# Patient Record
Sex: FEMALE | Race: BLACK OR AFRICAN AMERICAN | HISPANIC OR LATINO | Employment: FULL TIME | URBAN - METROPOLITAN AREA
[De-identification: names, ages, dates, MRNs, and addresses within clinical notes are randomized per-mention and may not be internally consistent; named-entity substitution may affect disease eponyms.]

---

## 2017-04-10 ENCOUNTER — ALLSCRIPTS OFFICE VISIT (OUTPATIENT)
Dept: OTHER | Facility: OTHER | Age: 27
End: 2017-04-10

## 2017-05-05 ENCOUNTER — GENERIC CONVERSION - ENCOUNTER (OUTPATIENT)
Dept: OTHER | Facility: OTHER | Age: 27
End: 2017-05-05

## 2017-05-05 ENCOUNTER — ALLSCRIPTS OFFICE VISIT (OUTPATIENT)
Dept: OTHER | Facility: OTHER | Age: 27
End: 2017-05-05

## 2017-05-05 ENCOUNTER — LAB REQUISITION (OUTPATIENT)
Dept: LAB | Facility: HOSPITAL | Age: 27
End: 2017-05-05
Payer: COMMERCIAL

## 2017-05-05 DIAGNOSIS — Z11.3 ENCOUNTER FOR SCREENING FOR INFECTIONS WITH PREDOMINANTLY SEXUAL MODE OF TRANSMISSION: ICD-10-CM

## 2017-05-05 PROCEDURE — 87591 N.GONORRHOEAE DNA AMP PROB: CPT | Performed by: NURSE PRACTITIONER

## 2017-05-05 PROCEDURE — 87491 CHLMYD TRACH DNA AMP PROBE: CPT | Performed by: NURSE PRACTITIONER

## 2017-05-08 LAB
CHLAMYDIA DNA CVX QL NAA+PROBE: NORMAL
N GONORRHOEA DNA GENITAL QL NAA+PROBE: NORMAL

## 2017-05-26 ENCOUNTER — APPOINTMENT (OUTPATIENT)
Dept: LAB | Facility: HOSPITAL | Age: 27
End: 2017-05-26
Payer: COMMERCIAL

## 2017-05-26 ENCOUNTER — ALLSCRIPTS OFFICE VISIT (OUTPATIENT)
Dept: OTHER | Facility: OTHER | Age: 27
End: 2017-05-26

## 2017-05-26 DIAGNOSIS — R39.9 UNSPECIFIED SYMPTOMS AND SIGNS INVOLVING THE GENITOURINARY SYSTEM: ICD-10-CM

## 2017-05-26 LAB
BACTERIA UR QL AUTO: ABNORMAL /HPF
BILIRUB UR QL STRIP: NEGATIVE
BILIRUB UR QL STRIP: NORMAL
CLARITY UR: ABNORMAL
CLARITY UR: NORMAL
COLOR UR: ABNORMAL
COLOR UR: YELLOW
GLUCOSE (HISTORICAL): NORMAL
GLUCOSE UR STRIP-MCNC: NEGATIVE MG/DL
HGB UR QL STRIP.AUTO: NEGATIVE
HGB UR QL STRIP.AUTO: NORMAL
HYALINE CASTS #/AREA URNS LPF: ABNORMAL /LPF
KETONES UR STRIP-MCNC: ABNORMAL MG/DL
KETONES UR STRIP-MCNC: NORMAL MG/DL
LEUKOCYTE ESTERASE UR QL STRIP: ABNORMAL
LEUKOCYTE ESTERASE UR QL STRIP: NORMAL
NITRITE UR QL STRIP: NEGATIVE
NITRITE UR QL STRIP: NORMAL
NON-SQ EPI CELLS URNS QL MICRO: ABNORMAL /HPF
PH UR STRIP.AUTO: 6 [PH]
PH UR STRIP.AUTO: 6 [PH] (ref 4.5–8)
PROT UR STRIP-MCNC: NEGATIVE MG/DL
PROT UR STRIP-MCNC: NORMAL MG/DL
RBC #/AREA URNS AUTO: ABNORMAL /HPF
SP GR UR STRIP.AUTO: 1.03
SP GR UR STRIP.AUTO: 1.03 (ref 1–1.03)
UROBILINOGEN UR QL STRIP.AUTO: 1
UROBILINOGEN UR QL STRIP.AUTO: 1 E.U./DL
WBC #/AREA URNS AUTO: ABNORMAL /HPF

## 2017-05-26 PROCEDURE — 87086 URINE CULTURE/COLONY COUNT: CPT

## 2017-05-26 PROCEDURE — 81001 URINALYSIS AUTO W/SCOPE: CPT

## 2017-05-27 LAB — BACTERIA UR CULT: NORMAL

## 2017-07-06 ENCOUNTER — ALLSCRIPTS OFFICE VISIT (OUTPATIENT)
Dept: OTHER | Facility: OTHER | Age: 27
End: 2017-07-06

## 2017-09-26 ENCOUNTER — ALLSCRIPTS OFFICE VISIT (OUTPATIENT)
Dept: OTHER | Facility: OTHER | Age: 27
End: 2017-09-26

## 2017-09-26 LAB
BILIRUB UR QL STRIP: NORMAL
CLARITY UR: NORMAL
CLUE CELL (HISTORICAL): NORMAL
COLOR UR: YELLOW
GLUCOSE (HISTORICAL): NORMAL
HGB UR QL STRIP.AUTO: NORMAL
HYPHAL YEAST (HISTORICAL): NORMAL
KETONES UR STRIP-MCNC: NORMAL MG/DL
KOH PREP (HISTORICAL): NORMAL
LEUKOCYTE ESTERASE UR QL STRIP: NORMAL
NITRITE UR QL STRIP: NORMAL
PH UR STRIP.AUTO: 4.5 [PH]
PH UR STRIP.AUTO: 5 [PH]
PROT UR STRIP-MCNC: NORMAL MG/DL
SP GR UR STRIP.AUTO: 1.02
TRICHOMONAS (HISTORICAL): NORMAL
UROBILINOGEN UR QL STRIP.AUTO: 0.2
YEAST (HISTORICAL): NORMAL

## 2017-12-06 ENCOUNTER — GENERIC CONVERSION - ENCOUNTER (OUTPATIENT)
Dept: OTHER | Facility: OTHER | Age: 27
End: 2017-12-06

## 2017-12-09 ENCOUNTER — APPOINTMENT (EMERGENCY)
Dept: RADIOLOGY | Facility: HOSPITAL | Age: 27
End: 2017-12-09
Payer: COMMERCIAL

## 2017-12-09 ENCOUNTER — HOSPITAL ENCOUNTER (EMERGENCY)
Facility: HOSPITAL | Age: 27
Discharge: HOME | End: 2017-12-09
Attending: EMERGENCY MEDICINE
Payer: COMMERCIAL

## 2017-12-09 VITALS
WEIGHT: 163 LBS | OXYGEN SATURATION: 98 % | HEART RATE: 71 BPM | HEIGHT: 67 IN | RESPIRATION RATE: 18 BRPM | SYSTOLIC BLOOD PRESSURE: 128 MMHG | TEMPERATURE: 97.8 F | BODY MASS INDEX: 25.58 KG/M2 | DIASTOLIC BLOOD PRESSURE: 74 MMHG

## 2017-12-09 DIAGNOSIS — F10.929 ALCOHOL INTOXICATION (HCC): ICD-10-CM
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
## 2017-12-09 DIAGNOSIS — S02.85XA: ICD-10-CM

## 2017-12-09 DIAGNOSIS — S01.81XA FACIAL LACERATION, INITIAL ENCOUNTER: ICD-10-CM

## 2017-12-09 PROCEDURE — 99285 EMERGENCY DEPT VISIT HI MDM: CPT

## 2017-12-09 PROCEDURE — 72125 CT NECK SPINE W/O DYE: CPT

## 2017-12-09 PROCEDURE — 90715 TDAP VACCINE 7 YRS/> IM: CPT | Performed by: EMERGENCY MEDICINE

## 2017-12-09 PROCEDURE — 90471 IMMUNIZATION ADMIN: CPT

## 2017-12-09 PROCEDURE — 70486 CT MAXILLOFACIAL W/O DYE: CPT

## 2017-12-09 PROCEDURE — 70450 CT HEAD/BRAIN W/O DYE: CPT

## 2017-12-09 RX ORDER — ONDANSETRON 2 MG/ML
INJECTION INTRAMUSCULAR; INTRAVENOUS
Status: DISCONTINUED
Start: 2017-12-09 | End: 2017-12-09 | Stop reason: HOSPADM

## 2017-12-09 RX ORDER — IBUPROFEN 600 MG/1
600 TABLET ORAL ONCE
Status: COMPLETED | OUTPATIENT
Start: 2017-12-09 | End: 2017-12-09

## 2017-12-09 RX ORDER — ONDANSETRON 4 MG/1
4 TABLET, ORALLY DISINTEGRATING ORAL ONCE
Status: COMPLETED | OUTPATIENT
Start: 2017-12-09 | End: 2017-12-09

## 2017-12-09 RX ORDER — ONDANSETRON 2 MG/ML
4 INJECTION INTRAMUSCULAR; INTRAVENOUS ONCE
Status: DISCONTINUED | OUTPATIENT
Start: 2017-12-09 | End: 2017-12-09

## 2017-12-09 RX ADMIN — IBUPROFEN 600 MG: 600 TABLET, FILM COATED ORAL at 05:52

## 2017-12-09 RX ADMIN — ONDANSETRON 4 MG: 4 TABLET, ORALLY DISINTEGRATING ORAL at 05:17

## 2017-12-09 RX ADMIN — TETANUS TOXOID, REDUCED DIPHTHERIA TOXOID AND ACELLULAR PERTUSSIS VACCINE, ADSORBED 0.5 ML: 5; 2.5; 8; 8; 2.5 SUSPENSION INTRAMUSCULAR at 05:02
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
## 2017-12-09 NOTE — ED NOTES
Pt complaining about neck immobilizer being on  Removed by pt and replace by RN  Pt instructed that she must leave the collar until tests are performed       Kim Hamm RN  12/09/17 2599
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
## 2017-12-09 NOTE — DISCHARGE INSTRUCTIONS
Facial Fracture   WHAT YOU NEED TO KNOW:   A facial fracture is a break in one or more of the bones in your face  The bones in your face include those around your eye, your cheekbones, and the bones of your nose and jaw  A facial fracture may also cause damage to nearby tissue  DISCHARGE INSTRUCTIONS:   Medicines:   · Decongestant medicine:  Decongestants help decrease swelling in your nose and sinuses  This medicine may also help you breathe easier  · Pain medicine: You may be given a prescription medicine to decrease pain  Do not wait until the pain is severe before you take this medicine  · Steroid medicine: This medicine helps decrease swelling in your face  · Antibiotic medicine:  Antibiotic medicine helps treat an infection caused by bacteria  This medicine may be given if you have an open wound  · Take your medicine as directed  Contact your healthcare provider if you think your medicine is not helping or if you have side effects  Tell him of her if you are allergic to any medicine  Keep a list of the medicines, vitamins, and herbs you take  Include the amounts, and when and why you take them  Bring the list or the pill bottles to follow-up visits  Carry your medicine list with you in case of an emergency  Follow up with your healthcare provider as directed:  Write down your questions so you remember to ask them during your visits  Nutrition:  You may not be able to eat solid food for a period of time  You may first be started on a liquid diet  Examples of liquids you may be able to have include, water, broth, gelatin, apple juice, or lemon-lime soda pop  After a few days, you may be allowed to eat soft foods, such as applesauce, bananas, cooked cereal, cottage cheese, pudding, and yogurt  Ask for more information about the type of foods you can eat  Rehabilitation:  If you had surgery to fix your facial fracture, you may need oral and facial rehabilitation   This is done to restore normal use and movement of your facial muscles  Ask for more information about rehabilitation  Help prevent a facial fracture:   · Wear a helmet when you ride a bicycle or a motorcycle  · Wear a seatbelt at all times when you are inside a motor vehicle  · Wear protective headgear and eyewear during sporting activities  Self-care:   · Apply ice:  Ice helps decrease swelling and pain  Ice may also help prevent tissue damage  Use an ice pack or put crushed ice in a plastic bag  Cover it with a towel and place it on your face for 15 to 20 minutes every hour as directed  · Keep your head elevated:  Keep you head above the level of your heart as often as you can  This will help decrease swelling and pain  Prop your head on pillows or blankets to keep it elevated comfortably  · Avoid putting pressure on your face:      ¨ Do not sleep on the injured side of your face  Pressure on the area of your injury may cause further damage  ¨ Sneeze with your mouth open to decrease pressure on your broken facial bones  Too much pressure from a sneeze may cause your broken bones to move and cause more damage  ¨ Try not to blow your nose because it may cause more damage if you have a fracture near your eye  The pressure from blowing your nose may pinch the nerve of your eye and cause permanent damage  · Clean your mouth carefully: It may be hard to clean your teeth if have an injury or fracture near your mouth  Your will be shown the best way to do this so you do not hurt yourself  A water pick or a child-sized soft toothbrush may work well to clean your mouth  Contact your healthcare provider if:   · You are bleeding from a wound on your face  · You have double vision or you suddenly have problems with your eyesight  · You have questions or concerns about your condition or care  Return to the emergency department if:   · You have clear or pinkish fluid draining from your nose or mouth      · You have numbness in your face  · You have worsening pain in your eye or face  · You suddenly have trouble chewing or swallowing  · You suddenly feel lightheaded and short of breath  · You have chest pain when you take a deep breath or cough  You may cough up blood  · Your arm or leg feels warm, tender, and painful  It may look swollen and red  © 2017 [address] [employer] Information is for End User's use only and may not be sold, redistributed or otherwise used for commercial purposes  All illustrations and images included in CareNotes® are the copyrighted property of [employer]  or [employer]  The above information is an  only  It is not intended as medical advice for individual conditions or treatments  Talk to your doctor, nurse or pharmacist before following any medical regimen to see if it is safe and effective for you  Laceration   WHAT YOU NEED TO KNOW:   A laceration is an injury to the skin and the soft tissue underneath it  Lacerations happen when you are cut or hit by something  They can happen anywhere on the body  DISCHARGE INSTRUCTIONS:   Return to the emergency department if:   · You have heavy bleeding or bleeding that does not stop after 10 minutes of holding firm, direct pressure over the wound  · Your wound opens up  Contact your healthcare provider if:   · You have a fever or chills  · Your laceration is red, warm, or swollen  · You have red streaks on your skin coming from your wound  · You have white or yellow drainage from the wound that smells bad  · You have pain that gets worse, even after treatment  · You have questions or concerns about your condition or care  Medicines:   · Prescription pain medicine  may be given  Ask how to take this medicine safely  · Antibiotics  help treat or prevent a bacterial infection  · Take your medicine as directed    Contact your healthcare provider if you think your medicine is not helping or if you have side effects  Tell him or her if you are allergic to any medicine  Keep a list of the medicines, vitamins, and herbs you take  Include the amounts, and when and why you take them  Bring the list or the pill bottles to follow-up visits  Carry your medicine list with you in case of an emergency  Care for your wound as directed:   · Do not get your wound wet  until your healthcare provider says it is okay  Do not soak your wound in water  Do not go swimming until your healthcare provider says it is okay  Carefully wash the wound with soap and water  Gently pat the area dry or allow it to air dry  · Change your bandages  when they get wet, dirty, or after washing  Apply new, clean bandages as directed  Do not apply elastic bandages or tape too tight  Do not put powders or lotions over your incision  · Apply antibiotic ointment as directed  Your healthcare provider may give you antibiotic ointment to put over your wound if you have stitches  If you have strips of tape over your incision, let them dry up and fall off on their own  If they do not fall off within 14 days, gently remove them  If you have glue over your wound, do not remove or pick at it  If your glue comes off, do not replace it with glue that you have at home  · Check your wound every day for signs of infection such as swelling, redness, or pus  Self-care:   · Apply ice  on your wound for 15 to 20 minutes every hour or as directed  Use an ice pack, or put crushed ice in a plastic bag  Cover it with a towel  Ice helps prevent tissue damage and decreases swelling and pain  · Use a splint as directed  A splint will decrease movement and stress on your wound  It may help it heal faster  A splint may be used for lacerations over joints or areas of your body that bend  Ask your healthcare provider how to apply and remove a splint  · Decrease scarring of your wound  by applying ointments as directed   Do not apply ointments until your healthcare provider says it is okay  You may need to wait until your wound is healed  Ask which ointment to buy and how often to use it  After your wound is healed, use sunscreen over the area when you are out in the sun  You should do this for at least 6 months to 1 year after your injury  Follow up with your healthcare provider as directed: You may need to follow up in 24 to 48 hours to have your wound checked for infection  You will need to return in 3 to 14 days if you have stitches or staples so they can be removed  Care for your wound as directed to prevent infection and help it heal  Write down your questions so you remember to ask them during your visits  © 2017 [address] [facility] Information is for End User's use only and may not be sold, redistributed or otherwise used for commercial purposes  All illustrations and images included in CareNotes® are the copyrighted property of A D A M , Inc  or [employer]  The above information is an  only  It is not intended as medical advice for individual conditions or treatments  Talk to your doctor, nurse or pharmacist before following any medical regimen to see if it is safe and effective for you

## 2017-12-22 ENCOUNTER — ALLSCRIPTS OFFICE VISIT (OUTPATIENT)
Dept: OTHER | Facility: OTHER | Age: 27
End: 2017-12-22

## 2018-01-12 VITALS
HEIGHT: 66 IN | WEIGHT: 143 LBS | DIASTOLIC BLOOD PRESSURE: 86 MMHG | BODY MASS INDEX: 22.98 KG/M2 | SYSTOLIC BLOOD PRESSURE: 120 MMHG

## 2018-01-13 VITALS — BODY MASS INDEX: 23.93 KG/M2 | DIASTOLIC BLOOD PRESSURE: 81 MMHG | WEIGHT: 146 LBS | SYSTOLIC BLOOD PRESSURE: 138 MMHG

## 2018-01-13 VITALS — SYSTOLIC BLOOD PRESSURE: 130 MMHG | DIASTOLIC BLOOD PRESSURE: 85 MMHG | WEIGHT: 146 LBS | BODY MASS INDEX: 23.93 KG/M2

## 2018-01-13 NOTE — PROGRESS NOTES
Chief Complaint  Patient is here for Depo today  Active Problems    1  Encounter for surveillance of injectable contraceptive (V25 49) (Z30 42)   2  UTI symptoms (788 99) (R39 9)    Current Meds   1  Depo-Provera 150 MG/ML Intramuscular Suspension; Therapy: (Recorded:[id number]) to Recorded   2  MedroxyPROGESTERone Acetate 150 MG/ML Intramuscular Suspension; INJECT   EVERY 11 WEEKS AS DIRECTED; Therapy: [id number] to (Last Lisa Vinnie)  Requested for: 27Jun2017 Ordered    Allergies    1  No Known Drug Allergies    2   Seasonal    Vitals  Signs    Systolic: 624  Diastolic: 81  Weight: 897 lb   BMI Calculated: 23 93  BSA Calculated: 1 74    Future Appointments    Date/Time Provider Specialty Site   09/26/2017 09:15 AM [facility], Injection Schedule  [facility]     Signatures   Electronically signed by : Bruce Ferreira, ; Jul 6 2017  3:29PM EST                       (Author)    Electronically signed by : Swati Francis MD; Jul 6 2017  3:31PM EST                       (Administrative)

## 2018-01-14 VITALS — BODY MASS INDEX: 24.29 KG/M2 | DIASTOLIC BLOOD PRESSURE: 90 MMHG | WEIGHT: 148.2 LBS | SYSTOLIC BLOOD PRESSURE: 149 MMHG

## 2018-01-17 NOTE — MISCELLANEOUS
Provider Comments  Provider Comments:   PT NO SHOW FOR DEPO SENDING LETTER      Signatures   Electronically signed by : Kori Camarillo, ; Jan 25 2016  8:57AM EST                       (Author)    Electronically signed by :  DOMONIQUE Galindo; Jan 25 2016  2:02PM EST                       (Acknowledgement)    Electronically signed by : Gianni Clemens MD; Feb 4 2016  8:23AM EST

## 2018-01-18 NOTE — PROGRESS NOTES
Chief Complaint  Depo today  Active Problems    1  Burning with urination (788 1) (R30 0)   2  Candidal vulvovaginitis (112 1) (B37 3)   3  Encounter for contraceptive management (V25 9) (Z30 9)   4  Encounter for contraceptive management (V25 9) (Z30 9)   5  Encounter for routine gynecological examination (V72 31) (Z01 419)   6  Encounter for routine gynecological examination with Papanicolaou smear of cervix   (V72 31,V76 2) (Z01 419)   7  Encounter for surveillance of injectable contraceptive (V25 49) (Z30 42)   8  Screening for STD (sexually transmitted disease) (V74 5) (Z11 3)   9  UTI (urinary tract infection) (599 0) (N39 0)    Current Meds   1  Depo-Provera 150 MG/ML Intramuscular Suspension; Therapy: (Recorded:42Hum0435) to Recorded   2  MedroxyPROGESTERone Acetate 150 MG/ML Intramuscular Suspension; INJECT   EVERY 11 WEEKS AS DIRECTED; Therapy: 83FAL8334 to (Last Rx:60Mdx1321)  Requested for: 41CZI6312 Ordered    Allergies    1  No Known Drug Allergies    2  Seasonal    Vitals  Signs    Systolic: 967  Diastolic: 84  Weight: 148 lb     Plan  Encounter for contraceptive management    · MedroxyPROGESTERone Acetate 150 MG/ML Intramuscular Suspension    Future Appointments    Date/Time Provider Specialty Site   05/05/2017 08:30 AM [facility] [place], CRNP Schedule  [facility]   06/26/2017 09:15 AM Irina Manning [address], Injection Schedule  [facility] [address]  [facility]     Signatures   Electronically signed by :  ELIER Manjarrez ; Apr 10 2017 10:47AM EST                       (Author)

## 2018-01-18 NOTE — RESULT NOTES
Verified Results  (1) URINALYSIS w URINE C/S REFLEX (will reflex a microscopy if leukocytes, occult blood, or nitrites are not within normal limits) 61Zoe0866 04:45PM Radha [place] Order Number: [id number]   Order Number: [id number]     Test Name Result Flag Reference   COLOR Yellow     CLARITY Cloudy     PH UA 6 0  4 5-8 0   LEUKOCYTE ESTERASE UA Moderate A Negative   NITRITE UA Positive A Negative   PROTEIN UA 30 (1+) mg/dl A Negative   GLUCOSE UA Negative mg/dl  Negative   KETONES UA Negative mg/dl  Negative   UROBILINOGEN UA 1 0 E U /dl  0 2, 1 0 E U /dl   BILIRUBIN UA Negative  Negative   BLOOD UA Small A Negative   SPECIFIC GRAVITY UA 1 021  1 003-1 030   BACTERIA Occasional /hpf  None Seen, Occasional   EPITHELIAL CELLS None Seen /hpf  None Seen, Occasional   RBC UA 4-10 /hpf A None Seen   WBC UA Innumerable /hpf A None Seen

## 2018-01-22 VITALS — WEIGHT: 143 LBS | BODY MASS INDEX: 23.08 KG/M2 | DIASTOLIC BLOOD PRESSURE: 84 MMHG | SYSTOLIC BLOOD PRESSURE: 123 MMHG

## 2018-01-23 VITALS
WEIGHT: 148.4 LBS | SYSTOLIC BLOOD PRESSURE: 122 MMHG | BODY MASS INDEX: 23.24 KG/M2 | DIASTOLIC BLOOD PRESSURE: 54 MMHG | HEART RATE: 97 BPM

## 2018-01-23 NOTE — MISCELLANEOUS
Message  Patient changed pharmacy to future scripts, depo script was faxed to [phone] on 12/6      Active Problems    1  Encounter for surveillance of injectable contraceptive (V25 49) (Z30 42)   2  Vulvovaginal candidiasis (112 1) (B37 3)    Current Meds   1  Depo-Provera 150 MG/ML Intramuscular Suspension (MedroxyPROGESTERone   Acetate); Therapy: (Recorded:71Lio3481) to Recorded   2  Fluconazole 150 MG Oral Tablet; TAKE 1 TABLET 1 TIME ONLY; Therapy: 06KES6032 to [phone])  Requested for: 62QCE7003; Last   Rx:70Zxn9296 Ordered   3  MedroxyPROGESTERone Acetate 150 MG/ML Intramuscular Suspension   (Depo-Provera); INJECT EVERY 11 WEEKS AS DIRECTED; Therapy: 43SZK8515 to (Last Rx:10Zcm9403)  Requested for: 08Viw8270   Ordered    Allergies    1  No Known Drug Allergies    2   Seasonal    Signatures   Electronically signed by : Osito Georges RN; Dec  6 2017  8:18AM EST                       (Author)

## 2018-01-23 NOTE — PROGRESS NOTES
Chief Complaint  patient received depo injection today      Active Problems    1  Encounter for Depo-Provera contraception (V25 49) (Z30 42)   2  Encounter for surveillance of injectable contraceptive (V25 49) (Z30 42)   3  Vulvovaginal candidiasis (112 1) (B37 3)    Current Meds   1  Depo-Provera 150 MG/ML Intramuscular Suspension; Therapy: (Recorded:67Jsd8830) to Recorded   2  Fluconazole 150 MG Oral Tablet; TAKE 1 TABLET 1 TIME ONLY; Therapy: 35TDG3774 to (Ida Starch)  Requested for: 20MIJ9166; Last   Rx:79Lfn9527 Ordered   3  MedroxyPROGESTERone Acetate 150 MG/ML Intramuscular Suspension; INJECT   EVERY 11 WEEKS AS DIRECTED; Therapy: 53VPH9361 to (Last Rx:50Jvf0936)  Requested for: 38Mss4291 Ordered    Allergies    1  No Known Drug Allergies    2  Seasonal    Vitals  Signs    Heart Rate: 97  Systolic: 114  Diastolic: 54  Weight: 263 lb 6 4 oz  BMI Calculated: 24 32  BSA Calculated: 1 75    Plan  Encounter for Depo-Provera contraception    · MedroxyPROGESTERone Acetate 150 MG/ML Intramuscular Suspension  (Depo-Provera)    Future Appointments    Date/Time Provider Specialty Site   03/09/2018 08:15 AM Irina Manning [address], Injection Schedule  [facility]     Signatures   Electronically signed by :  DOMONIQUE Castro; Jan 4 2018 10:16AM EST                       (Acknowledgement)    Electronically signed by : Drea Andrews MD; Jan 4 2018 10:56AM EST                       (Administrative)

## 2018-03-19 ENCOUNTER — OFFICE VISIT (OUTPATIENT)
Dept: OBGYN CLINIC | Facility: CLINIC | Age: 28
End: 2018-03-19
Payer: COMMERCIAL

## 2018-03-19 VITALS
WEIGHT: 149 LBS | HEART RATE: 80 BPM | SYSTOLIC BLOOD PRESSURE: 130 MMHG | BODY MASS INDEX: 23.34 KG/M2 | DIASTOLIC BLOOD PRESSURE: 83 MMHG

## 2018-03-19 DIAGNOSIS — Z30.011 ENCOUNTER FOR INITIAL PRESCRIPTION OF CONTRACEPTIVE PILLS: Primary | ICD-10-CM

## 2018-03-19 PROCEDURE — 99214 OFFICE O/P EST MOD 30 MIN: CPT | Performed by: NURSE PRACTITIONER

## 2018-03-19 RX ORDER — MEDROXYPROGESTERONE ACETATE 150 MG/ML
150 INJECTION, SUSPENSION INTRAMUSCULAR
COMMUNITY
End: 2018-07-20

## 2018-03-19 RX ORDER — NORGESTIMATE AND ETHINYL ESTRADIOL 0.25-0.035
1 KIT ORAL DAILY
Qty: 28 TABLET | Refills: 3 | Status: SHIPPED | OUTPATIENT
Start: 2018-03-19 | End: 2018-07-05 | Stop reason: SDUPTHER

## 2018-03-19 NOTE — PROGRESS NOTES
Assessment/Plan:         Diagnoses and all orders for this visit:    Encounter for initial prescription of contraceptive pills  -     norgestimate-ethinyl estradiol (ORTHO-CYCLEN) 0 25-35 MG-MCG per tablet; Take 1 tablet by mouth daily for 28 days    Other orders  -     medroxyPROGESTERone (DEPO-PROVERA) 150 mg/mL injection; Inject 150 mg into the shoulder, thigh, or buttocks every 3 (three) months      PlanReturn for annual exam  And OCP check after 6/19/2018  Start Oral contraception pills today  Use a condom for the first 7 day  If you have question refer to the information sheet provided or call  Pt verbalized understanding of all discussed  Subjective: l       Patient ID: Kg Chris is a 34 y o  female  HPI   Adolfo Rivas presents today to switch from Depo to OCP's  She was on OCP's in the past and states she wanted to restart  Pt states she gained weight on Depo  Depo was due 3/9/2018  No intercourse since  Plans to start OCP's today and use condoms for the first 7 days  Last WNL PAP 4/2016  Plans annual exam with OCP check       The following portions of the patient's history were reviewed and updated as appropriate: allergies, current medications, past family history, past medical history, past social history, past surgical history and problem list     Review of Systems    Negative today  Denies pain    Objective:      /83   Pulse 80   Wt 67 6 kg (149 lb)   BMI 23 34 kg/m²          Physical Exam    Alert and oriented Plans annual GYN exam with OCP check in 3 months

## 2018-03-19 NOTE — PATIENT INSTRUCTIONS
Return for annual exam  And OCP check after 6/19/2018  Start Oral contraception pills today  Use a condom for the first 7 day  If you have question refer to the information sheet provided or call

## 2018-06-20 ENCOUNTER — OFFICE VISIT (OUTPATIENT)
Dept: OBGYN CLINIC | Facility: CLINIC | Age: 28
End: 2018-06-20
Payer: COMMERCIAL

## 2018-06-20 VITALS
SYSTOLIC BLOOD PRESSURE: 130 MMHG | BODY MASS INDEX: 25.16 KG/M2 | HEIGHT: 65 IN | WEIGHT: 151 LBS | DIASTOLIC BLOOD PRESSURE: 85 MMHG | HEART RATE: 90 BPM

## 2018-06-20 DIAGNOSIS — Z30.41 ENCOUNTER FOR SURVEILLANCE OF CONTRACEPTIVE PILLS: Primary | ICD-10-CM

## 2018-06-20 PROCEDURE — 99213 OFFICE O/P EST LOW 20 MIN: CPT | Performed by: NURSE PRACTITIONER

## 2018-06-20 NOTE — PATIENT INSTRUCTIONS
Continue for birth control pills as directed  Missed or Late Pills: For combined (Estrogen and Progestin) pills:    If one hormonal pill is LATE (<24 hours since a pill should have been taken): Take the late or missed pill as soon as possible  Continue taking the remaining pills at the usual time (even if it means taking two pills on the same day)  No additional contraceptive protection is needed  Emergency contraception is not usually needed but can be considered if hormonal pills were missed earlier in the cycle or in the last week of the previous cycle  If one hormonal pill has been MISSED (24 to <48 hours since a pill should have been taken): Take the late or missed pill as soon as possible  Continue taking the remaining pills at the usual time (even if it means taking two pills on the same day)  No additional contraceptive protection is needed  Emergency contraception is not usually needed but can be considered if hormonal pills were missed earlier in the cycle or in the last week of the previous cycle  If two or more consecutive hormonal pills have been missed: (less than or equal to 48 hours since a pill should have been taken): Take the most recent missed pill as soon as possible  (Any other missed pills should be discarded ) Continue taking the remaining pills at the usual time (even if it means taking two pills on the same day)  Use back-up contraception (e g , condoms) or avoid sexual intercourse until hormonal pills have been taken for 7 consecutive days  If pills were missed in the last week of hormonal pills (e g , days 15-21 for 28-day pill packs): Omit the hormone-free interval by finishing the horomal pills in the current pack and starting a new pack the next day  If unable to start a new pack immediately, use back-up contraception (e g , condoms) or avoid sexual intercourse until hormonal pills from a new pack have been taken for 7 consecutive days   Emergency contraception should be considered if hormonal pills were missed during the first week and unprotected sexual intercourse occurred in the previous 5 days  Emergency contraception may also be considered at other times as appropriate  Source: [place]  Selected Practice Recommendations for Contraceptive Use, 2013      Keep appointment for annual exam already scheduled  Call with needs or concerns

## 2018-06-20 NOTE — PROGRESS NOTES
Assessment/Plan:         Diagnoses and all orders for this visit:    Encounter for surveillance of contraceptive pills    Other orders  -     [address] [facility] 28 0 25-35 MG-MCG per tablet;       Plan  Continue for birth control pills as directed  Missed or Late Pills: For combined (Estrogen and Progestin) pills:    If one hormonal pill is LATE (<24 hours since a pill should have been taken): Take the late or missed pill as soon as possible  Continue taking the remaining pills at the usual time (even if it means taking two pills on the same day)  No additional contraceptive protection is needed  Emergency contraception is not usually needed but can be considered if hormonal pills were missed earlier in the cycle or in the last week of the previous cycle  If one hormonal pill has been MISSED (24 to <48 hours since a pill should have been taken): Take the late or missed pill as soon as possible  Continue taking the remaining pills at the usual time (even if it means taking two pills on the same day)  No additional contraceptive protection is needed  Emergency contraception is not usually needed but can be considered if hormonal pills were missed earlier in the cycle or in the last week of the previous cycle  If two or more consecutive hormonal pills have been missed: (less than or equal to 48 hours since a pill should have been taken): Take the most recent missed pill as soon as possible  (Any other missed pills should be discarded ) Continue taking the remaining pills at the usual time (even if it means taking two pills on the same day)  Use back-up contraception (e g , condoms) or avoid sexual intercourse until hormonal pills have been taken for 7 consecutive days  If pills were missed in the last week of hormonal pills (e g , days 15-21 for 28-day pill packs): Omit the hormone-free interval by finishing the horomal pills in the current pack and starting a new pack the next day   If unable to start a new pack immediately, use back-up contraception (e g , condoms) or avoid sexual intercourse until hormonal pills from a new pack have been taken for 7 consecutive days  Emergency contraception should be considered if hormonal pills were missed during the first week and unprotected sexual intercourse occurred in the previous 5 days  Emergency contraception may also be considered at other times as appropriate  Source: [place] S  Selected Practice Recommendations for Contraceptive Use, 2013  Keep appointment for annual exam already scheduled  Call with needs or concerns   Pt verbalized understanding of all discussed  Subjective:      Patient ID: Quynh Lerner is a 34 y o  female  HPI   Pt presents to continue OCP's  Pt states periods are better  Pt does not miss pills and knows what to do if she does  Jayson Vivas has an appointment for annual GYN exam scheduled      The following portions of the patient's history were reviewed and updated as appropriate: allergies, current medications, past family history, past medical history, past social history, past surgical history and problem list     Review of Systems    Negative  Denies pain    Objective:      /85   Pulse 90   Ht 5' 5" (1 651 m)   Wt 68 5 kg (151 lb)   LMP 06/08/2018 (Exact Date)   BMI 25 13 kg/m²          Physical Exam    Alert and oriented   Denies pain  Has annual GYN exam scheduled

## 2018-07-01 DIAGNOSIS — Z30.011 ENCOUNTER FOR INITIAL PRESCRIPTION OF CONTRACEPTIVE PILLS: ICD-10-CM

## 2018-07-05 DIAGNOSIS — Z30.011 ENCOUNTER FOR INITIAL PRESCRIPTION OF CONTRACEPTIVE PILLS: ICD-10-CM

## 2018-07-06 DIAGNOSIS — Z30.41 ENCOUNTER FOR SURVEILLANCE OF CONTRACEPTIVE PILLS: Primary | ICD-10-CM

## 2018-07-06 RX ORDER — NORGESTIMATE AND ETHINYL ESTRADIOL 0.25-0.035
1 KIT ORAL DAILY
Qty: 28 TABLET | Refills: 11 | Status: SHIPPED | OUTPATIENT
Start: 2018-07-06 | End: 2018-07-20

## 2018-07-20 ENCOUNTER — OFFICE VISIT (OUTPATIENT)
Dept: OBGYN CLINIC | Facility: CLINIC | Age: 28
End: 2018-07-20
Payer: COMMERCIAL

## 2018-07-20 VITALS
HEART RATE: 96 BPM | DIASTOLIC BLOOD PRESSURE: 81 MMHG | SYSTOLIC BLOOD PRESSURE: 125 MMHG | HEIGHT: 65 IN | WEIGHT: 153 LBS | BODY MASS INDEX: 25.49 KG/M2

## 2018-07-20 DIAGNOSIS — Z30.41 ENCOUNTER FOR SURVEILLANCE OF CONTRACEPTIVE PILLS: ICD-10-CM

## 2018-07-20 DIAGNOSIS — Z01.419 ENCOUNTER FOR ANNUAL ROUTINE GYNECOLOGICAL EXAMINATION: Primary | ICD-10-CM

## 2018-07-20 DIAGNOSIS — Z11.3 SCREEN FOR STD (SEXUALLY TRANSMITTED DISEASE): ICD-10-CM

## 2018-07-20 PROCEDURE — 87591 N.GONORRHOEAE DNA AMP PROB: CPT | Performed by: NURSE PRACTITIONER

## 2018-07-20 PROCEDURE — S0612 ANNUAL GYNECOLOGICAL EXAMINA: HCPCS | Performed by: NURSE PRACTITIONER

## 2018-07-20 PROCEDURE — 87491 CHLMYD TRACH DNA AMP PROBE: CPT | Performed by: NURSE PRACTITIONER

## 2018-07-20 RX ORDER — NORGESTIMATE AND ETHINYL ESTRADIOL 0.25-0.035
1 KIT ORAL DAILY
Qty: 28 TABLET | Refills: 14 | Status: SHIPPED | OUTPATIENT
Start: 2018-07-20 | End: 2019-07-21 | Stop reason: SDUPTHER

## 2018-07-20 NOTE — PATIENT INSTRUCTIONS
STD testin will be available in 2 weeks  Continue birth control pills as directed  Missed or Late Pills: For combined (Estrogen and Progestin) pills:    If one hormonal pill is LATE (<24 hours since a pill should have been taken): Take the late or missed pill as soon as possible  Continue taking the remaining pills at the usual time (even if it means taking two pills on the same day)  No additional contraceptive protection is needed  Emergency contraception is not usually needed but can be considered if hormonal pills were missed earlier in the cycle or in the last week of the previous cycle  If one hormonal pill has been MISSED (24 to <48 hours since a pill should have been taken): Take the late or missed pill as soon as possible  Continue taking the remaining pills at the usual time (even if it means taking two pills on the same day)  No additional contraceptive protection is needed  Emergency contraception is not usually needed but can be considered if hormonal pills were missed earlier in the cycle or in the last week of the previous cycle  If two or more consecutive hormonal pills have been missed: (less than or equal to 48 hours since a pill should have been taken): Take the most recent missed pill as soon as possible  (Any other missed pills should be discarded ) Continue taking the remaining pills at the usual time (even if it means taking two pills on the same day)  Use back-up contraception (e g , condoms) or avoid sexual intercourse until hormonal pills have been taken for 7 consecutive days  If pills were missed in the last week of hormonal pills (e g , days 15-21 for 28-day pill packs): Omit the hormone-free interval by finishing the horomal pills in the current pack and starting a new pack the next day  If unable to start a new pack immediately, use back-up contraception (e g , condoms) or avoid sexual intercourse until hormonal pills from a new pack have been taken for 7 consecutive days  Emergency contraception should be considered if hormonal pills were missed during the first week and unprotected sexual intercourse occurred in the previous 5 days  Emergency contraception may also be considered at other times as appropriate  Source: U S  Selected Practice Recommendations for Contraceptive Use, 2013    Call with needs or concerned  Remember safe sex and condom use  Return in 1 year for annual exam and PAP

## 2018-07-20 NOTE — PROGRESS NOTES
Assessment   1  Encounter for annual routine gynecological examination     2  Encounter for surveillance of contraceptive pills  norgestimate-ethinyl estradiol (ORTHO-CYCLEN) 0 25-35 MG-MCG per tablet   3  Screen for STD (sexually transmitted disease)  Chlamydia/GC amplified DNA by PCR    CANCELED: Chlamydia/GC amplified DNA by PCR               Plan      All questions answered  Breast self exam technique reviewed and patient encouraged to perform self-exam monthly  Contraception: OCP (estrogen/progesterone)  Discussed healthy lifestyle modifications  Follow up in 1 year  STD testin will be available in 2 weeks  Continue birth control pills as directed  Missed or Late Pills: For combined (Estrogen and Progestin) pills:    If one hormonal pill is LATE (<24 hours since a pill should have been taken): Take the late or missed pill as soon as possible  Continue taking the remaining pills at the usual time (even if it means taking two pills on the same day)  No additional contraceptive protection is needed  Emergency contraception is not usually needed but can be considered if hormonal pills were missed earlier in the cycle or in the last week of the previous cycle  If one hormonal pill has been MISSED (24 to <48 hours since a pill should have been taken): Take the late or missed pill as soon as possible  Continue taking the remaining pills at the usual time (even if it means taking two pills on the same day)  No additional contraceptive protection is needed  Emergency contraception is not usually needed but can be considered if hormonal pills were missed earlier in the cycle or in the last week of the previous cycle  If two or more consecutive hormonal pills have been missed: (less than or equal to 48 hours since a pill should have been taken): Take the most recent missed pill as soon as possible   (Any other missed pills should be discarded ) Continue taking the remaining pills at the usual time (even if it means taking two pills on the same day)  Use back-up contraception (e g , condoms) or avoid sexual intercourse until hormonal pills have been taken for 7 consecutive days  If pills were missed in the last week of hormonal pills (e g , days 15-21 for 28-day pill packs): Omit the hormone-free interval by finishing the horomal pills in the current pack and starting a new pack the next day  If unable to start a new pack immediately, use back-up contraception (e g , condoms) or avoid sexual intercourse until hormonal pills from a new pack have been taken for 7 consecutive days  Emergency contraception should be considered if hormonal pills were missed during the first week and unprotected sexual intercourse occurred in the previous 5 days  Emergency contraception may also be considered at other times as appropriate  Source: U S  Selected Practice Recommendations for Contraceptive Use, 2013  Call with needs or concerned  Remember safe sex and condom use  Return in 1 year for annual exam and PAP      Subjective      Michelle Nash is a 34 y o  female who presents for annual exam  Periods are regular every 28-30 days, lasting 3 days  Dysmenorrhea:mild, occurring premenstrually  Cyclic symptoms include none  No intermenstrual bleeding, spotting, or discharge  The patient reports that there is not domestic violence in her life  1 partner x 2 years no longer together    Current contraception: OCP (estrogen/progesterone), would like to continue  History of abnormal Pap smear: no  Family history of uterine or ovarian cancer: no  Regular self breast exam: yes  History of abnormal mammogram: no  Family history of breast cancer: no  History of abnormal lipids: unknown  Menstrual History:  OB History      Para Term  AB Living    1 0     1 0    SAB TAB Ectopic Multiple Live Births      1               Menarche age: 17  Patient's last menstrual period was 2018 (exact date)    Period Cycle (Days): 28  Period Duration (Days): 3  Period Pattern: Regular  Menstrual Flow: Moderate  Menstrual Control: Maxi pad  Dysmenorrhea: (!) Mild  Dysmenorrhea Symptoms: Cramping    The following portions of the patient's history were reviewed and updated as appropriate: allergies, current medications, past family history, past medical history, past social history, past surgical history and problem list     Review of Systems  Pertinent items are noted in HPI        Objective      /81   Pulse 96   Ht 5' 5" (1 651 m)   Wt 69 4 kg (153 lb)   LMP 07/06/2018 (Exact Date)   BMI 25 46 kg/m²     General:   alert and oriented, in no acute distress, alert, appears stated age and cooperative   Heart: regular rate and rhythm, S1, S2 normal, no murmur, click, rub or gallop   Lungs: clear to auscultation bilaterally   Thyroid negative masses   Abdomen: soft, non-tender, without masses or organomegaly   Vulva: normal   Vagina: normal mucosa   Cervix: no cervical motion tenderness and no lesions   Uterus: normal size, normal shape and consistency   Adnexa: normal adnexa   Breast NT, negative discharge dimpling or masses, nipples are pierced

## 2018-07-25 LAB
CHLAMYDIA DNA CVX QL NAA+PROBE: NORMAL
N GONORRHOEA DNA GENITAL QL NAA+PROBE: NORMAL

## 2018-10-18 ENCOUNTER — APPOINTMENT (OUTPATIENT)
Dept: LAB | Facility: HOSPITAL | Age: 28
End: 2018-10-18
Payer: COMMERCIAL

## 2018-10-18 ENCOUNTER — TRANSCRIBE ORDERS (OUTPATIENT)
Dept: ADMINISTRATIVE | Facility: HOSPITAL | Age: 28
End: 2018-10-18

## 2018-10-18 ENCOUNTER — OFFICE VISIT (OUTPATIENT)
Dept: FAMILY MEDICINE CLINIC | Facility: CLINIC | Age: 28
End: 2018-10-18
Payer: COMMERCIAL

## 2018-10-18 VITALS
SYSTOLIC BLOOD PRESSURE: 132 MMHG | HEIGHT: 65 IN | BODY MASS INDEX: 25.83 KG/M2 | TEMPERATURE: 98.3 F | WEIGHT: 155 LBS | DIASTOLIC BLOOD PRESSURE: 78 MMHG | HEART RATE: 88 BPM

## 2018-10-18 DIAGNOSIS — Z00.00 ROUTINE GENERAL MEDICAL EXAMINATION AT A HEALTH CARE FACILITY: Primary | ICD-10-CM

## 2018-10-18 DIAGNOSIS — Z00.00 ROUTINE GENERAL MEDICAL EXAMINATION AT A HEALTH CARE FACILITY: ICD-10-CM

## 2018-10-18 DIAGNOSIS — Z00.00 WELL ADULT EXAM: Primary | ICD-10-CM

## 2018-10-18 PROBLEM — Z01.419 ENCOUNTER FOR ANNUAL ROUTINE GYNECOLOGICAL EXAMINATION: Status: RESOLVED | Noted: 2018-07-20 | Resolved: 2018-10-18

## 2018-10-18 PROBLEM — Z30.011 ENCOUNTER FOR INITIAL PRESCRIPTION OF CONTRACEPTIVE PILLS: Status: RESOLVED | Noted: 2018-03-19 | Resolved: 2018-10-18

## 2018-10-18 LAB
ALBUMIN SERPL BCP-MCNC: 4 G/DL (ref 3.5–5)
ALP SERPL-CCNC: 109 U/L (ref 46–116)
ALT SERPL W P-5'-P-CCNC: 17 U/L (ref 12–78)
ANION GAP SERPL CALCULATED.3IONS-SCNC: 4 MMOL/L (ref 4–13)
AST SERPL W P-5'-P-CCNC: 17 U/L (ref 5–45)
BASOPHILS # BLD AUTO: 0.03 THOUSANDS/ΜL (ref 0–0.1)
BASOPHILS NFR BLD AUTO: 0 % (ref 0–1)
BILIRUB SERPL-MCNC: 1.03 MG/DL (ref 0.2–1)
BUN SERPL-MCNC: 10 MG/DL (ref 5–25)
CALCIUM SERPL-MCNC: 9.2 MG/DL (ref 8.3–10.1)
CHLORIDE SERPL-SCNC: 101 MMOL/L (ref 100–108)
CHOLEST SERPL-MCNC: 208 MG/DL (ref 50–200)
CO2 SERPL-SCNC: 28 MMOL/L (ref 21–32)
CREAT SERPL-MCNC: 0.77 MG/DL (ref 0.6–1.3)
EOSINOPHIL # BLD AUTO: 0.02 THOUSAND/ΜL (ref 0–0.61)
EOSINOPHIL NFR BLD AUTO: 0 % (ref 0–6)
ERYTHROCYTE [DISTWIDTH] IN BLOOD BY AUTOMATED COUNT: 11.5 % (ref 11.6–15.1)
GFR SERPL CREATININE-BSD FRML MDRD: 123 ML/MIN/1.73SQ M
GLUCOSE P FAST SERPL-MCNC: 90 MG/DL (ref 65–99)
HCT VFR BLD AUTO: 42.9 % (ref 34.8–46.1)
HDLC SERPL-MCNC: 77 MG/DL (ref 40–60)
HGB BLD-MCNC: 13.5 G/DL (ref 11.5–15.4)
IMM GRANULOCYTES # BLD AUTO: 0.04 THOUSAND/UL (ref 0–0.2)
IMM GRANULOCYTES NFR BLD AUTO: 0 % (ref 0–2)
LDLC SERPL CALC-MCNC: 119 MG/DL (ref 0–100)
LYMPHOCYTES # BLD AUTO: 2.17 THOUSANDS/ΜL (ref 0.6–4.47)
LYMPHOCYTES NFR BLD AUTO: 21 % (ref 14–44)
MCH RBC QN AUTO: 28.5 PG (ref 26.8–34.3)
MCHC RBC AUTO-ENTMCNC: 31.5 G/DL (ref 31.4–37.4)
MCV RBC AUTO: 91 FL (ref 82–98)
MONOCYTES # BLD AUTO: 0.51 THOUSAND/ΜL (ref 0.17–1.22)
MONOCYTES NFR BLD AUTO: 5 % (ref 4–12)
NEUTROPHILS # BLD AUTO: 7.38 THOUSANDS/ΜL (ref 1.85–7.62)
NEUTS SEG NFR BLD AUTO: 74 % (ref 43–75)
NONHDLC SERPL-MCNC: 131 MG/DL
NRBC BLD AUTO-RTO: 0 /100 WBCS
PLATELET # BLD AUTO: 362 THOUSANDS/UL (ref 149–390)
PMV BLD AUTO: 10.1 FL (ref 8.9–12.7)
POTASSIUM SERPL-SCNC: 3.9 MMOL/L (ref 3.5–5.3)
PROT SERPL-MCNC: 8.2 G/DL (ref 6.4–8.2)
RBC # BLD AUTO: 4.74 MILLION/UL (ref 3.81–5.12)
SODIUM SERPL-SCNC: 133 MMOL/L (ref 136–145)
TRIGL SERPL-MCNC: 61 MG/DL
TSH SERPL DL<=0.05 MIU/L-ACNC: 0.41 UIU/ML (ref 0.36–3.74)
WBC # BLD AUTO: 10.15 THOUSAND/UL (ref 4.31–10.16)

## 2018-10-18 PROCEDURE — 36415 COLL VENOUS BLD VENIPUNCTURE: CPT

## 2018-10-18 PROCEDURE — 80053 COMPREHEN METABOLIC PANEL: CPT

## 2018-10-18 PROCEDURE — 84443 ASSAY THYROID STIM HORMONE: CPT

## 2018-10-18 PROCEDURE — 80061 LIPID PANEL: CPT

## 2018-10-18 PROCEDURE — 99385 PREV VISIT NEW AGE 18-39: CPT | Performed by: FAMILY MEDICINE

## 2018-10-18 PROCEDURE — 85025 COMPLETE CBC W/AUTO DIFF WBC: CPT

## 2018-10-18 NOTE — PROGRESS NOTES
Assessment/Plan:         Diagnoses and all orders for this visit:    Well adult exam  -     CBC and differential  -     Comprehensive metabolic panel  -     Lipid panel  -     TSH, 3rd generation with Free T4 reflex    Other orders  -     Discontinue: SPRINTEC 28 0 25-35 MG-MCG per tablet;           Subjective:      Patient ID: Misa Arana is a 34 y o  female  She feels well no complaints  She is here for a regular checkup  She does have a family history of thyroid disease  She also has a family history of asthma in her mother  The following portions of the patient's history were reviewed and updated as appropriate: allergies, current medications, past family history, past medical history, past social history, past surgical history and problem list     Review of Systems   Constitutional: Negative  HENT: Negative  Eyes: Negative  Respiratory: Negative  Cardiovascular: Negative  Gastrointestinal: Negative  Endocrine: Negative  Genitourinary: Negative  Musculoskeletal: Negative  Skin: Negative  Allergic/Immunologic: Negative  Neurological: Negative  Hematological: Negative  Psychiatric/Behavioral: Negative  All other systems reviewed and are negative  Objective:      /78 (BP Location: Left arm)   Pulse 88   Temp 98 3 °F (36 8 °C)   Ht 5' 5" (1 651 m)   Wt 70 3 kg (155 lb)   LMP 10/18/2018   BMI 25 79 kg/m²          Physical Exam   Constitutional: She is oriented to person, place, and time  She appears well-developed and well-nourished  HENT:   Head: Normocephalic and atraumatic  Right Ear: External ear normal    Left Ear: External ear normal    Nose: Nose normal    Mouth/Throat: Oropharynx is clear and moist    Eyes: Pupils are equal, round, and reactive to light  Conjunctivae and EOM are normal    Neck: Normal range of motion  Neck supple  Cardiovascular: Normal rate, regular rhythm and normal heart sounds      Pulmonary/Chest: Effort normal and breath sounds normal    Abdominal: Soft  Bowel sounds are normal    Musculoskeletal: Normal range of motion  Neurological: She is alert and oriented to person, place, and time  She has normal reflexes  Skin: Skin is warm and dry  Psychiatric: She has a normal mood and affect  Her behavior is normal    Nursing note and vitals reviewed

## 2018-12-12 ENCOUNTER — OFFICE VISIT (OUTPATIENT)
Dept: OBGYN CLINIC | Facility: CLINIC | Age: 28
End: 2018-12-12
Payer: COMMERCIAL

## 2018-12-12 VITALS
SYSTOLIC BLOOD PRESSURE: 128 MMHG | DIASTOLIC BLOOD PRESSURE: 79 MMHG | HEIGHT: 65 IN | BODY MASS INDEX: 26.39 KG/M2 | WEIGHT: 158.4 LBS | HEART RATE: 73 BPM

## 2018-12-12 DIAGNOSIS — N76.0 BACTERIAL VAGINOSIS: Primary | ICD-10-CM

## 2018-12-12 DIAGNOSIS — B96.89 BACTERIAL VAGINOSIS: Primary | ICD-10-CM

## 2018-12-12 LAB
BV WHIFF TEST VAG QL: POSITIVE
CLUE CELLS SPEC QL WET PREP: POSITIVE
PH SMN: 5.5 [PH]
SL AMB POCT WET MOUNT: ABNORMAL
TRICHOMONAS GENITAL CULT: NEGATIVE
YEAST VAG QL WET PREP: NEGATIVE

## 2018-12-12 PROCEDURE — 99213 OFFICE O/P EST LOW 20 MIN: CPT | Performed by: NURSE PRACTITIONER

## 2018-12-12 PROCEDURE — 87210 SMEAR WET MOUNT SALINE/INK: CPT | Performed by: NURSE PRACTITIONER

## 2018-12-12 RX ORDER — METRONIDAZOLE 500 MG/1
500 TABLET ORAL EVERY 12 HOURS SCHEDULED
Qty: 14 TABLET | Refills: 0 | Status: SHIPPED | OUTPATIENT
Start: 2018-12-12 | End: 2018-12-19

## 2018-12-12 NOTE — PROGRESS NOTES
Assessment/Plan:         Diagnoses and all orders for this visit:    Bacterial vaginosis  -     metroNIDAZOLE (FLAGYL) 500 mg tablet; Take 1 tablet (500 mg total) by mouth every 12 (twelve) hours for 7 days  -     POCT wet mount     Plan   Take Metronidazole as directed  Call with needs or concerns  Annual GYN exam and PAP is due in July  Pt verbalized understanding of all discussed  Subjective:      Patient ID: Avis Hsu is a 34 y o  female  HPI   Pt presents with C/O intermittent thin white vaginal discharge over the last few months  Pt states she has not recently been sexually active  Pt states broke up with her partner of 1 year  Explained wet mount was positive BV  Safe and effective se of Metronidazole provided, reinforced safe sex    Annual exam and WNL PAP 7/2018    The following portions of the patient's history were reviewed and updated as appropriate: allergies, current medications, past family history, past medical history, past social history, past surgical history and problem list     Review of Systems    Negative except for vaginal discharge    Objective:      /79   Pulse 73   Ht 5' 5" (1 651 m)   Wt 71 8 kg (158 lb 6 4 oz)   LMP 11/13/2018 (Exact Date)   BMI 26 36 kg/m²          Physical Exam    Alert and oriented  Denies pain  Vulva negative lesions or erythema  Vagina thin white discharge  Cervix negative lesions, positive thin white discharge  Wet Mount positive for BV

## 2019-06-04 DIAGNOSIS — Z00.00 WELL ADULT EXAM: Primary | ICD-10-CM

## 2019-06-04 DIAGNOSIS — Z01.84 IMMUNITY STATUS TESTING: Primary | ICD-10-CM

## 2019-06-05 ENCOUNTER — TELEPHONE (OUTPATIENT)
Dept: FAMILY MEDICINE CLINIC | Facility: CLINIC | Age: 29
End: 2019-06-05

## 2019-06-07 ENCOUNTER — APPOINTMENT (OUTPATIENT)
Dept: LAB | Facility: HOSPITAL | Age: 29
End: 2019-06-07
Payer: COMMERCIAL

## 2019-06-07 DIAGNOSIS — Z01.84 IMMUNITY STATUS TESTING: ICD-10-CM

## 2019-06-07 DIAGNOSIS — Z00.00 WELL ADULT EXAM: ICD-10-CM

## 2019-06-07 PROCEDURE — 36415 COLL VENOUS BLD VENIPUNCTURE: CPT

## 2019-06-07 PROCEDURE — 86480 TB TEST CELL IMMUN MEASURE: CPT

## 2019-06-07 PROCEDURE — 86787 VARICELLA-ZOSTER ANTIBODY: CPT

## 2019-06-10 LAB
GAMMA INTERFERON BACKGROUND BLD IA-ACNC: 0.03 IU/ML
M TB IFN-G BLD-IMP: NEGATIVE
M TB IFN-G CD4+ BCKGRND COR BLD-ACNC: -0.01 IU/ML
M TB IFN-G CD4+ BCKGRND COR BLD-ACNC: 0 IU/ML
MITOGEN IGNF BCKGRD COR BLD-ACNC: >10 IU/ML

## 2019-06-11 LAB — VZV IGG SER IA-ACNC: NORMAL

## 2019-07-21 DIAGNOSIS — Z30.41 ENCOUNTER FOR SURVEILLANCE OF CONTRACEPTIVE PILLS: ICD-10-CM

## 2019-07-31 ENCOUNTER — OFFICE VISIT (OUTPATIENT)
Dept: OBGYN CLINIC | Facility: CLINIC | Age: 29
End: 2019-07-31

## 2019-07-31 VITALS
WEIGHT: 163.4 LBS | BODY MASS INDEX: 27.19 KG/M2 | SYSTOLIC BLOOD PRESSURE: 130 MMHG | HEART RATE: 78 BPM | DIASTOLIC BLOOD PRESSURE: 88 MMHG

## 2019-07-31 DIAGNOSIS — Z01.419 ENCOUNTER FOR ANNUAL ROUTINE GYNECOLOGICAL EXAMINATION: ICD-10-CM

## 2019-07-31 DIAGNOSIS — Z11.3 SCREEN FOR STD (SEXUALLY TRANSMITTED DISEASE): Primary | ICD-10-CM

## 2019-07-31 DIAGNOSIS — Z30.41 ENCOUNTER FOR SURVEILLANCE OF CONTRACEPTIVE PILLS: ICD-10-CM

## 2019-07-31 PROCEDURE — 87491 CHLMYD TRACH DNA AMP PROBE: CPT | Performed by: OBSTETRICS & GYNECOLOGY

## 2019-07-31 PROCEDURE — G0145 SCR C/V CYTO,THINLAYER,RESCR: HCPCS | Performed by: OBSTETRICS & GYNECOLOGY

## 2019-07-31 PROCEDURE — 99395 PREV VISIT EST AGE 18-39: CPT | Performed by: OBSTETRICS & GYNECOLOGY

## 2019-07-31 PROCEDURE — 87591 N.GONORRHOEAE DNA AMP PROB: CPT | Performed by: OBSTETRICS & GYNECOLOGY

## 2019-07-31 RX ORDER — NORGESTIMATE AND ETHINYL ESTRADIOL 0.25-0.035
1 KIT ORAL DAILY
Qty: 28 TABLET | Refills: 1 | Status: SHIPPED | OUTPATIENT
Start: 2019-07-31 | End: 2020-09-08

## 2019-07-31 NOTE — PROGRESS NOTES
OB/GYN  PRENATAL H&P VISIT  Nithya Olivier  2019  1:31 PM  Dr Brenna Harvey, DO      SUBJECTIVE  Patient is a G***P*** at *** here for initial prenatal H&P  This is an ***intended and desired pregnancy  FOB is ***, *** present with patient  She is currently doing well***  She works at [employer] She *** hx of STD/STI, denies a hx of TB or close contacts with persons with TB  She *** a family history of inheritable conditions such as physical or intellectual disabilities, birth defects, blood disorders, heart or neural tube defects  She *** had MRSA  She *** recent travel or travel planned in the near future  She *** use of nicotine or recreational drug use  She denies vaginal bleeding, cramping, leakage, abnormal discharge  OB History    Para Term  AB Living   1 0 0 0 1 0   SAB TAB Ectopic Multiple Live Births   0 1 0 0 0      # Outcome Date GA Lbr Sherman/2nd Weight Sex Delivery Anes PTL Lv   1 TAB                Review of Systems    Past Medical History:   Diagnosis Date    Chlamydia        Past Surgical History:   Procedure Laterality Date    INDUCED       Surgically induced  Description: 2012        Social History     Socioeconomic History    Marital status: Single     Spouse name: Not on file    Number of children: Not on file    Years of education: Not on file    Highest education level: Not on file   Occupational History    Not on file   Social Needs    Financial resource strain: Not on file    Food insecurity:     Worry: Not on file     Inability: Not on file    Transportation needs:     Medical: Not on file     Non-medical: Not on file   Tobacco Use    Smoking status: Never Smoker    Smokeless tobacco: Never Used   Substance and Sexual Activity    Alcohol use:  Yes    Drug use: No    Sexual activity: Yes     Partners: Male     Birth control/protection: Pill   Lifestyle    Physical activity:     Days per week: Not on file     Minutes per session: Not on file    Stress: Not on file   Relationships    Social connections:     Talks on phone: Not on file     Gets together: Not on file     Attends Jew service: Not on file     Active member of club or organization: Not on file     Attends meetings of clubs or organizations: Not on file     Relationship status: Not on file    Intimate partner violence:     Fear of current or ex partner: Not on file     Emotionally abused: Not on file     Physically abused: Not on file     Forced sexual activity: Not on file   Other Topics Concern    Not on file   Social History Narrative    Not on file       OBJECTIVE  Vitals:    19 1323   BP: 130/88   Pulse: 66     OBGyn Exam    ASSESSMENT AND PLAN    34 y o , , with /88   Pulse 78   Wt 74 1 kg (163 lb 6 4 oz)   LMP 2019 (Exact Date) , at *** here for her prenatal H&P  FHT *** by ***    1  Pregnancy: H&P completed today  PN Labs reviewed today  *** Labor expectations discussed with patient, including appointment schedule, nutrition, exercise, medications, sexual intercourse, and nausea/vomiting  Patient's BMI is ***  Recommended weight gain is ***      2  Screening: Pap smear ***  GC/CT ***collected  Sequential screening reviewed with patient - will proceed with ***  3  Consents: Delivery process including potential OVD and  reviewed  Sign delivery consent form at 28 weeks  4  Labor: For analgesia, patient plans on ***     5  Postpartum: Patient plans on *** breastfeeding  Different methods of contraception were discussed with patient, including progesterone only oral pills, depo provera, nexplanon, mirena, and paragard  Patient would like to use *** during postpartum phase  6  Follow up: RTC in *** weeks  Precautions regarding labor, leakage, bleeding, and fetal movement reviewed      Eve Nicholas DO  2019  1:31 PM

## 2019-07-31 NOTE — PROGRESS NOTES
Assessment/Plan     Encounter for surveillance of contraceptive pills  Rx Sprintec with refill x 11     Missed or Late Pills: For combined (Estrogen and Progestin) pills:    If one hormonal pill is LATE (<24 hours since a pill should have been taken): Take the late or missed pill as soon as possible  Continue taking the remaining pills at the usual time (even if it means taking two pills on the same day)  No additional contraceptive protection is needed  Emergency contraception is not usually needed but can be considered if hormonal pills were missed earlier in the cycle or in the last week of the previous cycle  If one hormonal pill has been MISSED (24 to <48 hours since a pill should have been taken): Take the late or missed pill as soon as possible  Continue taking the remaining pills at the usual time (even if it means taking two pills on the same day)  No additional contraceptive protection is needed  Emergency contraception is not usually needed but can be considered if hormonal pills were missed earlier in the cycle or in the last week of the previous cycle  If two or more consecutive hormonal pills have been missed: (less than or equal to 48 hours since a pill should have been taken): Take the most recent missed pill as soon as possible  (Any other missed pills should be discarded ) Continue taking the remaining pills at the usual time (even if it means taking two pills on the same day)  Use back-up contraception (e g , condoms) or avoid sexual intercourse until hormonal pills have been taken for 7 consecutive days  If pills were missed in the last week of hormonal pills (e g , days 15-21 for 28-day pill packs): Omit the hormone-free interval by finishing the horomal pills in the current pack and starting a new pack the next day   If unable to start a new pack immediately, use back-up contraception (e g , condoms) or avoid sexual intercourse until hormonal pills from a new pack have been taken for 7 consecutive days  Emergency contraception should be considered if hormonal pills were missed during the first week and unprotected sexual intercourse occurred in the previous 5 days  Emergency contraception may also be considered at other times as appropriate  Source: [place] S  Selected Practice Recommendations for Contraceptive Use, 2013  Well adult exam  Vaginal hygiene encouraged  RTC 1 year       Cari Cosme is a 34 y o  female who presents for annual well woman exam  Periods are regular every 28-30 days, lasting 4 days  No intermenstrual bleeding, spotting, or discharge  GYN:  · Denies  vaginal discharge, labial erythema or lesions, dyspareunia  · Contraception: Sprintec, desires continuation, does not desire pregnancy within the next year  · Patient is sexually active with both male and female partners  · Last pap smear was in 2016  Results were WNL, due for screening today  · Denies gynecologic surgeries  OB:  ·      :  · Denies dysuria, urinary frequency or urgency  · Denies hematuria, flank pain, incontinence  Breast:  · Denies breast mass, skin changes, dimpling, reddening, nipple retraction  · Denies  breast discharge  · Patient does not have a family history of breast, endometrial, or ovarian ca  General:  · Diet: non-descrpit  · Exercise: walks daily  · Work: health aide, starting nursing school soon  · ETOH use: social  · Tobacco use: denies  · Recreational drug use: denies     Screening:  · STD screening: declines    Review of Systems  Review of Systems   Constitutional: Negative for chills, diaphoresis, fatigue and fever  HENT: Negative for congestion, rhinorrhea, sinus pressure and sinus pain  Eyes: Negative for pain, redness and itching  Respiratory: Negative for cough, choking, chest tightness and shortness of breath  Cardiovascular: Negative for chest pain, palpitations and leg swelling     Gastrointestinal: Negative for abdominal pain, constipation, diarrhea, nausea and vomiting  Genitourinary: Negative for dysuria, hematuria, menstrual problem, pelvic pain, vaginal bleeding, vaginal discharge and vaginal pain  Musculoskeletal: Negative for arthralgias, back pain, myalgias and neck pain  Skin: Negative for color change, pallor, rash and wound  Neurological: Negative for dizziness, weakness, light-headedness and headaches  Psychiatric/Behavioral: Negative for self-injury, sleep disturbance and suicidal ideas  The patient is not nervous/anxious  Objective      /88   Pulse 78   Wt 74 1 kg (163 lb 6 4 oz)   LMP 07/02/2019 (Exact Date)   Breastfeeding? No   BMI 27 19 kg/m²     Physical Exam   Constitutional: She is oriented to person, place, and time  She appears well-developed and well-nourished  No distress  Cardiovascular: Normal rate, regular rhythm, normal heart sounds and intact distal pulses  Pulmonary/Chest: Effort normal and breath sounds normal  No stridor  No respiratory distress  Abdominal: Soft  Bowel sounds are normal  She exhibits no distension  There is no tenderness  Genitourinary: Vagina normal and uterus normal  There is no rash, tenderness or lesion on the right labia  There is no rash, tenderness or lesion on the left labia  Cervix exhibits no motion tenderness, no discharge and no friability  Right adnexum displays no mass, no tenderness and no fullness  Left adnexum displays no mass, no tenderness and no fullness  No erythema, tenderness or bleeding in the vagina  No foreign body in the vagina  No signs of injury around the vagina  No vaginal discharge found  Neurological: She is alert and oriented to person, place, and time  Skin: Skin is warm and dry  She is not diaphoretic  Psychiatric: She has a normal mood and affect   Her behavior is normal

## 2019-07-31 NOTE — PATIENT INSTRUCTIONS
Wellness Visit for Adults   AMBULATORY CARE:   A wellness visit  is when you see your healthcare provider to get screened for health problems  You can also get advice on how to stay healthy  Write down your questions so you remember to ask them  Ask your healthcare provider how often you should have a wellness visit  What happens at a wellness visit:  Your healthcare provider will ask about your health, and your family history of health problems  This includes high blood pressure, heart disease, and cancer  He or she will ask if you have symptoms that concern you, if you smoke, and about your mood  You may also be asked about your intake of medicines, supplements, food, and alcohol  Any of the following may be done:  · Your weight  will be checked  Your height may also be checked so your body mass index (BMI) can be calculated  Your BMI shows if you are at a healthy weight  · Your blood pressure  and heart rate will be checked  Your temperature may also be checked  · Blood and urine tests  may be done  Blood tests may be done to check your cholesterol levels  Abnormal cholesterol levels increase your risk for heart disease and stroke  You may also need a blood or urine test to check for diabetes if you are at increased risk  Urine tests may be done to look for signs of an infection or kidney disease  · A physical exam  includes checking your heartbeat and lungs with a stethoscope  Your healthcare provider may also check your skin to look for sun damage  · Screening tests  may be recommended  A screening test is done to check for diseases that may not cause symptoms  The screening tests you may need depend on your age, gender, family history, and lifestyle habits  For example, colorectal screening may be recommended if you are 48years old or older  Screening tests you need if you are a woman:   · A Pap smear  is used to screen for cervical cancer   Pap smears are usually done every 3 to 5 years depending on your age  You may need them more often if you have had abnormal Pap smear test results in the past  Ask your healthcare provider how often you should have a Pap smear  · A mammogram  is an x-ray of your breasts to screen for breast cancer  Experts recommend mammograms every 2 years starting at age 48 years  You may need a mammogram at age 52 years or younger if you have an increased risk for breast cancer  Talk to your healthcare provider about when you should start having mammograms and how often you need them  Vaccines you may need:   · Get an influenza vaccine  every year  The influenza vaccine protects you from the flu  Several types of viruses cause the flu  The viruses change over time, so new vaccines are made each year  · Get a tetanus-diphtheria (Td) booster vaccine  every 10 years  This vaccine protects you against tetanus and diphtheria  Tetanus is a severe infection that may cause painful muscle spasms and lockjaw  Diphtheria is a severe bacterial infection that causes a thick covering in the back of your mouth and throat  · Get a human papillomavirus (HPV) vaccine  if you are female and aged 23 to 32 or male 23 to 24 and never received it  This vaccine protects you from HPV infection  HPV is the most common infection spread by sexual contact  HPV may also cause vaginal, penile, and anal cancers  · Get a pneumococcal vaccine  if you are aged 72 years or older  The pneumococcal vaccine is an injection given to protect you from pneumococcal disease  Pneumococcal disease is an infection caused by pneumococcal bacteria  The infection may cause pneumonia, meningitis, or an ear infection  · Get a shingles vaccine  if you are aged 61 or older, even if you have had shingles before  The shingles vaccine is an injection to protect you from the varicella-zoster virus  This is the same virus that causes chickenpox   Shingles is a painful rash that develops in people who had chickenpox or have been exposed to the virus  How to eat healthy:  My Plate is a model for planning healthy meals  It shows the types and amounts of foods that should go on your plate  Fruits and vegetables make up about half of your plate, and grains and protein make up the other half  A serving of dairy is included on the side of your plate  The amount of calories and serving sizes you need depends on your age, gender, weight, and height  Examples of healthy foods are listed below:  · Eat a variety of vegetables  such as dark green, red, and orange vegetables  You can also include canned vegetables low in sodium (salt) and frozen vegetables without added butter or sauces  · Eat a variety of fresh fruits , canned fruit in 100% juice, frozen fruit, and dried fruit  · Include whole grains  At least half of the grains you eat should be whole grains  Examples include whole-wheat bread, wheat pasta, brown rice, and whole-grain cereals such as oatmeal     · Eat a variety of protein foods such as seafood (fish and shellfish), lean meat, and poultry without skin (turkey and chicken)  Examples of lean meats include pork leg, shoulder, or tenderloin, and beef round, sirloin, tenderloin, and extra lean ground beef  Other protein foods include eggs and egg substitutes, beans, peas, soy products, nuts, and seeds  · Choose low-fat dairy products such as skim or 1% milk or low-fat yogurt, cheese, and cottage cheese  · Limit unhealthy fats  such as butter, hard margarine, and shortening  Exercise:  Exercise at least 30 minutes per day on most days of the week  Some examples of exercise include walking, biking, dancing, and swimming  You can also fit in more physical activity by taking the stairs instead of the elevator or parking farther away from stores  Include muscle strengthening activities 2 days each week  Regular exercise provides many health benefits   It helps you manage your weight, and decreases your risk for type 2 diabetes, heart disease, stroke, and high blood pressure  Exercise can also help improve your mood  Ask your healthcare provider about the best exercise plan for you  General health and safety guidelines:   · Do not smoke  Nicotine and other chemicals in cigarettes and cigars can cause lung damage  Ask your healthcare provider for information if you currently smoke and need help to quit  E-cigarettes or smokeless tobacco still contain nicotine  Talk to your healthcare provider before you use these products  · Limit alcohol  A drink of alcohol is 12 ounces of beer, 5 ounces of wine, or 1½ ounces of liquor  · Lose weight, if needed  Being overweight increases your risk of certain health conditions  These include heart disease, high blood pressure, type 2 diabetes, and certain types of cancer  · Protect your skin  Do not sunbathe or use tanning beds  Use sunscreen with a SPF 15 or higher  Apply sunscreen at least 15 minutes before you go outside  Reapply sunscreen every 2 hours  Wear protective clothing, hats, and sunglasses when you are outside  · Drive safely  Always wear your seatbelt  Make sure everyone in your car wears a seatbelt  A seatbelt can save your life if you are in an accident  Do not use your cell phone when you are driving  This could distract you and cause an accident  Pull over if you need to make a call or send a text message  · Practice safe sex  Use latex condoms if are sexually active and have more than one partner  Your healthcare provider may recommend screening tests for sexually transmitted infections (STIs)  · Wear helmets, lifejackets, and protective gear  Always wear a helmet when you ride a bike or motorcycle, go skiing, or play sports that could cause a head injury  Wear protective equipment when you play sports  Wear a lifejacket when you are on a boat or doing water sports    © 2017 [employer] Information is for End User's use only and may not be sold, redistributed or otherwise used for commercial purposes  All illustrations and images included in CareNotes® are the copyrighted property of [employer]  or [employer]  The above information is an  only  It is not intended as medical advice for individual conditions or treatments  Talk to your doctor, nurse or pharmacist before following any medical regimen to see if it is safe and effective for you  Missed or Late Pills: For combined (Estrogen and Progestin) pills:    If one hormonal pill is LATE (<24 hours since a pill should have been taken): Take the late or missed pill as soon as possible  Continue taking the remaining pills at the usual time (even if it means taking two pills on the same day)  No additional contraceptive protection is needed  Emergency contraception is not usually needed but can be considered if hormonal pills were missed earlier in the cycle or in the last week of the previous cycle  If one hormonal pill has been MISSED (24 to <48 hours since a pill should have been taken): Take the late or missed pill as soon as possible  Continue taking the remaining pills at the usual time (even if it means taking two pills on the same day)  No additional contraceptive protection is needed  Emergency contraception is not usually needed but can be considered if hormonal pills were missed earlier in the cycle or in the last week of the previous cycle  If two or more consecutive hormonal pills have been missed: (less than or equal to 48 hours since a pill should have been taken): Take the most recent missed pill as soon as possible  (Any other missed pills should be discarded ) Continue taking the remaining pills at the usual time (even if it means taking two pills on the same day)  Use back-up contraception (e g , condoms) or avoid sexual intercourse until hormonal pills have been taken for 7 consecutive days   If pills were missed in the last week of hormonal pills (e g , days 15-21 for 28-day pill packs): Omit the hormone-free interval by finishing the horomal pills in the current pack and starting a new pack the next day  If unable to start a new pack immediately, use back-up contraception (e g , condoms) or avoid sexual intercourse until hormonal pills from a new pack have been taken for 7 consecutive days  Emergency contraception should be considered if hormonal pills were missed during the first week and unprotected sexual intercourse occurred in the previous 5 days  Emergency contraception may also be considered at other times as appropriate  Source: U S  Selected Practice Recommendations for Contraceptive Use, 2013

## 2019-07-31 NOTE — ASSESSMENT & PLAN NOTE
Rx Sprintec with refill x 11     Missed or Late Pills: For combined (Estrogen and Progestin) pills:    If one hormonal pill is LATE (<24 hours since a pill should have been taken): Take the late or missed pill as soon as possible  Continue taking the remaining pills at the usual time (even if it means taking two pills on the same day)  No additional contraceptive protection is needed  Emergency contraception is not usually needed but can be considered if hormonal pills were missed earlier in the cycle or in the last week of the previous cycle  If one hormonal pill has been MISSED (24 to <48 hours since a pill should have been taken): Take the late or missed pill as soon as possible  Continue taking the remaining pills at the usual time (even if it means taking two pills on the same day)  No additional contraceptive protection is needed  Emergency contraception is not usually needed but can be considered if hormonal pills were missed earlier in the cycle or in the last week of the previous cycle  If two or more consecutive hormonal pills have been missed: (less than or equal to 48 hours since a pill should have been taken): Take the most recent missed pill as soon as possible  (Any other missed pills should be discarded ) Continue taking the remaining pills at the usual time (even if it means taking two pills on the same day)  Use back-up contraception (e g , condoms) or avoid sexual intercourse until hormonal pills have been taken for 7 consecutive days  If pills were missed in the last week of hormonal pills (e g , days 15-21 for 28-day pill packs): Omit the hormone-free interval by finishing the horomal pills in the current pack and starting a new pack the next day  If unable to start a new pack immediately, use back-up contraception (e g , condoms) or avoid sexual intercourse until hormonal pills from a new pack have been taken for 7 consecutive days   Emergency contraception should be considered if hormonal pills were missed during the first week and unprotected sexual intercourse occurred in the previous 5 days  Emergency contraception may also be considered at other times as appropriate  Source: U S  Selected Practice Recommendations for Contraceptive Use, 2013

## 2019-08-02 LAB
C TRACH DNA SPEC QL NAA+PROBE: NEGATIVE
N GONORRHOEA DNA SPEC QL NAA+PROBE: NEGATIVE

## 2019-08-05 LAB
LAB AP GYN PRIMARY INTERPRETATION: NORMAL
Lab: NORMAL

## 2019-10-15 DIAGNOSIS — Z30.41 ENCOUNTER FOR SURVEILLANCE OF CONTRACEPTIVE PILLS: ICD-10-CM

## 2019-12-16 ENCOUNTER — OFFICE VISIT (OUTPATIENT)
Dept: OBGYN CLINIC | Facility: CLINIC | Age: 29
End: 2019-12-16

## 2019-12-16 VITALS
DIASTOLIC BLOOD PRESSURE: 86 MMHG | HEART RATE: 90 BPM | WEIGHT: 162.6 LBS | SYSTOLIC BLOOD PRESSURE: 127 MMHG | BODY MASS INDEX: 27.06 KG/M2

## 2019-12-16 DIAGNOSIS — Z72.51 HIGH RISK HETEROSEXUAL BEHAVIOR: Primary | ICD-10-CM

## 2019-12-16 PROCEDURE — 99213 OFFICE O/P EST LOW 20 MIN: CPT | Performed by: NURSE PRACTITIONER

## 2019-12-16 NOTE — PROGRESS NOTES
Assessment/Plan:    No problem-specific Assessment & Plan notes found for this encounter  Diagnoses and all orders for this visit:    High risk heterosexual behavior          Subjective:      Patient ID: Martin Reyes is a 34 y o  female  HPI  Pt states she had a positive Trich on a wet mount 11/2019, pt states she also had BV  Pt was treated withFlagl 2 grams on 11/20/2019 at [facility]  Pt states symptoms are gone and she feels better  Pt states she would like to return for F/U STD testint in 3 months  No longer with her partner  WNL PAP 7/2019    The following portions of the patient's history were reviewed and updated as appropriate: allergies, current medications, past family history, past medical history, past social history, past surgical history and problem list     Review of Systems      Pertinent items are note in the HPI      Objective:      /86   Pulse 90   Wt 73 8 kg (162 lb 9 6 oz)   LMP 12/11/2019 (Approximate)   Breastfeeding?  No   BMI 27 06 kg/m²          Physical Exam    Alert and oriented  Denies pain  WNL Respiratory  effort   WNL PAP 7/2019

## 2020-03-06 ENCOUNTER — TELEPHONE (OUTPATIENT)
Dept: FAMILY MEDICINE CLINIC | Facility: CLINIC | Age: 30
End: 2020-03-06

## 2020-03-06 DIAGNOSIS — Z00.00 WELL ADULT EXAM: Primary | ICD-10-CM

## 2020-03-23 ENCOUNTER — OFFICE VISIT (OUTPATIENT)
Dept: OBGYN CLINIC | Facility: CLINIC | Age: 30
End: 2020-03-23

## 2020-03-23 VITALS
SYSTOLIC BLOOD PRESSURE: 139 MMHG | DIASTOLIC BLOOD PRESSURE: 88 MMHG | WEIGHT: 160 LBS | BODY MASS INDEX: 26.63 KG/M2 | HEART RATE: 88 BPM

## 2020-03-23 DIAGNOSIS — Z11.3 SCREEN FOR STD (SEXUALLY TRANSMITTED DISEASE): Primary | ICD-10-CM

## 2020-03-23 LAB
BV WHIFF TEST VAG QL: NEGATIVE
CLUE CELLS SPEC QL WET PREP: NEGATIVE
PH SMN: 4 [PH]
SL AMB POCT WET MOUNT: NORMAL
T VAGINALIS VAG QL WET PREP: NEGATIVE
YEAST VAG QL WET PREP: NEGATIVE

## 2020-03-23 PROCEDURE — T1015 CLINIC SERVICE: HCPCS | Performed by: NURSE PRACTITIONER

## 2020-03-23 PROCEDURE — 87210 SMEAR WET MOUNT SALINE/INK: CPT | Performed by: NURSE PRACTITIONER

## 2020-03-23 PROCEDURE — 87491 CHLMYD TRACH DNA AMP PROBE: CPT | Performed by: NURSE PRACTITIONER

## 2020-03-23 PROCEDURE — 99213 OFFICE O/P EST LOW 20 MIN: CPT | Performed by: NURSE PRACTITIONER

## 2020-03-23 PROCEDURE — 87591 N.GONORRHOEAE DNA AMP PROB: CPT | Performed by: NURSE PRACTITIONER

## 2020-03-23 NOTE — PATIENT INSTRUCTIONS
STD results will be available in 2 weeks  Annual GYN exam is due after 7/31/2020  Call with needs or concerns

## 2020-03-23 NOTE — LETTER
3/24/2020    To Fela Haider  : 1990      This letter is to advise you that your recent CULTURE results were reviewed by me and are NORMAL  Please contact the office for an appointment if you have any additional concerns      DOMONIQUE Laird

## 2020-03-23 NOTE — PROGRESS NOTES
Assessment/Plan:         Diagnoses and all orders for this visit:    Screen for STD (sexually transmitted disease)  -     Chlamydia/GC amplified DNA by PCR  -     POCT wet mount      Explained wet mount was negative for [facility]  STD results will be available in 2 weeks  Annual GYN exam is due after 7/31/2020  Call with needs or concerns   Pt verbalized understanding of all discussed  Subjective:      Patient ID: Ramonita Randall is a 31 y o  female  HPI  Pt presents for a KESHA for Trich, patient was positive in December  Pt also requested testing for GC/Chlamydia, declined blood testing  The following portions of the patient's history were reviewed and updated as appropriate: allergies, current medications, past family history, past medical history, past social history, past surgical history and problem list     Review of Systems      Pertinent items are note in the HPI    Objective:      /88   Pulse 88   Wt 72 6 kg (160 lb)   LMP 03/03/2020 (Approximate)   Breastfeeding No   BMI 26 63 kg/m²          Physical Exam    Alert and oriented  Denies paib  WNL Respiratory effort  Vulva negative lesions or erythena  Vagina normal mucosa  Cervix negative lesions  WET mount was WNL

## 2020-03-24 ENCOUNTER — APPOINTMENT (OUTPATIENT)
Dept: LAB | Facility: HOSPITAL | Age: 30
End: 2020-03-24
Payer: COMMERCIAL

## 2020-03-24 LAB
C TRACH DNA SPEC QL NAA+PROBE: NEGATIVE
N GONORRHOEA DNA SPEC QL NAA+PROBE: NEGATIVE

## 2020-04-28 ENCOUNTER — TELEMEDICINE (OUTPATIENT)
Dept: OBGYN CLINIC | Facility: CLINIC | Age: 30
End: 2020-04-28

## 2020-04-28 DIAGNOSIS — N89.8 VAGINAL IRRITATION: ICD-10-CM

## 2020-04-28 DIAGNOSIS — L29.2 VULVAR ITCHING: Primary | ICD-10-CM

## 2020-04-28 PROCEDURE — T1015 CLINIC SERVICE: HCPCS | Performed by: NURSE PRACTITIONER

## 2020-04-28 PROCEDURE — 99213 OFFICE O/P EST LOW 20 MIN: CPT | Performed by: NURSE PRACTITIONER

## 2020-04-28 RX ORDER — FLUCONAZOLE 150 MG/1
150 TABLET ORAL ONCE
Qty: 1 TABLET | Refills: 1 | Status: SHIPPED | OUTPATIENT
Start: 2020-04-28 | End: 2020-04-28

## 2020-08-06 ENCOUNTER — TELEPHONE (OUTPATIENT)
Dept: OTHER | Facility: OTHER | Age: 30
End: 2020-08-06

## 2020-09-04 ENCOUNTER — TELEPHONE (OUTPATIENT)
Dept: OBGYN CLINIC | Facility: CLINIC | Age: 30
End: 2020-09-04

## 2020-09-04 NOTE — TELEPHONE ENCOUNTER
COVID Pre-Visit Screening     1  Is this a family member screening? No  2  Have you traveled outside of your state in the past 2 weeks? No  3  Do you presently have a fever or flu-like symptoms? No  4  Do you have symptoms of an upper respiratory infection like runny nose, sore throat, or cough? NO Are you suffering from new headache that you have not had in the past?  No  5  Do you have/have you experienced any new shortness of breath recently? No  6  Do you have any new diarrhea, nausea or vomiting? No  7  Have you been in contact with anyone who has been sick or diagnosed with COVID-19? No  8  Do you have any new loss of taste or smell? No  9  Are you able to wear a mask without a valve for the entire visit?  Yes

## 2020-09-08 ENCOUNTER — ANNUAL EXAM (OUTPATIENT)
Dept: OBGYN CLINIC | Facility: CLINIC | Age: 30
End: 2020-09-08

## 2020-09-08 VITALS
SYSTOLIC BLOOD PRESSURE: 130 MMHG | BODY MASS INDEX: 28.22 KG/M2 | WEIGHT: 169.6 LBS | DIASTOLIC BLOOD PRESSURE: 89 MMHG | TEMPERATURE: 97.3 F

## 2020-09-08 DIAGNOSIS — Z01.419 ENCOUNTER FOR ANNUAL ROUTINE GYNECOLOGICAL EXAMINATION: Primary | ICD-10-CM

## 2020-09-08 PROBLEM — N89.8 VAGINAL IRRITATION: Status: RESOLVED | Noted: 2020-04-28 | Resolved: 2020-09-08

## 2020-09-08 PROBLEM — Z72.51 HIGH RISK HETEROSEXUAL BEHAVIOR: Status: RESOLVED | Noted: 2019-12-16 | Resolved: 2020-09-08

## 2020-09-08 PROBLEM — N76.0 BACTERIAL VAGINOSIS: Status: RESOLVED | Noted: 2018-12-12 | Resolved: 2020-09-08

## 2020-09-08 PROBLEM — Z30.41 ENCOUNTER FOR SURVEILLANCE OF CONTRACEPTIVE PILLS: Status: RESOLVED | Noted: 2018-06-20 | Resolved: 2020-09-08

## 2020-09-08 PROBLEM — B96.89 BACTERIAL VAGINOSIS: Status: RESOLVED | Noted: 2018-12-12 | Resolved: 2020-09-08

## 2020-09-08 PROBLEM — L29.2 VULVAR ITCHING: Status: RESOLVED | Noted: 2020-04-28 | Resolved: 2020-09-08

## 2020-09-08 PROCEDURE — 4004F PT TOBACCO SCREEN RCVD TLK: CPT | Performed by: NURSE PRACTITIONER

## 2020-09-08 PROCEDURE — 99395 PREV VISIT EST AGE 18-39: CPT | Performed by: NURSE PRACTITIONER

## 2020-09-08 PROCEDURE — 3725F SCREEN DEPRESSION PERFORMED: CPT | Performed by: NURSE PRACTITIONER

## 2020-09-08 NOTE — PATIENT INSTRUCTIONS
See Family doctor to follow up elevated BP  Call with needs or concerns  Return in 1 year    COVID-19 Instructions    If you are having any of the following:  Cough   Shortness of breath   Fever  If traveled within past 2 weeks internationally or to high risk [place] states  Or been in contact with someone that has     Please call either:   Your PCP office  [phone], option [phone]    They will screen you over the phone and direct you to the nearest appropriate testing location    DO NOT go to your PCP or OB office without calling first

## 2020-09-08 NOTE — PROGRESS NOTES
Annual Exam    Assessment   1  Encounter for annual routine gynecological examination       well woman       Plan       All questions answered  Breast self exam technique reviewed and patient encouraged to perform self-exam monthly  Contraception: none  Discussed healthy lifestyle modifications  Follow up in 1 year  Patient Instructions   See Family doctor to follow up elevated BP  Call with needs or concerns  Return in 1 year  Pt verbalized understanding of all discussed  Subjective      Slava Bullard is a 31 y o   female who presents for annual well woman exam  Periods are regular every 28-30 days, lasting 5 days  No intermenstrual bleeding, spotting, or discharge  1 new partner, female x 3 months, denies domestic violence  Last WNL PAP 2019  Elevated BP, retake 130/89, plans to make family doctor appointment after this visit, denies H/A or visual changes     Current contraception: none, stop OCP's has a female partner  History of abnormal Pap smear: no  Family history of uterine or ovarian cancer: no  Regular self breast exam: yes  History of abnormal mammogram: N/A  Family history of breast cancer: no  History of abnormal lipids: Unknown  Menstrual History:  OB History        1    Para   0    Term                AB   1    Living   0       SAB        TAB   1    Ectopic        Multiple        Live Births                    Menarche age: 10  Patient's last menstrual period was 2020 (exact date)  Period Duration (Days): 5 days  Period Pattern: Regular    The following portions of the patient's history were reviewed and updated as appropriate: allergies, current medications, past family history, past medical history, past social history, past surgical history and problem list     Review of Systems  Pertinent items are noted in HPI        Objective      /89   Temp (!) 97 3 °F (36 3 °C)   Wt 76 9 kg (169 lb 9 6 oz)   LMP 2020 (Exact Date)   BMI 28 22 kg/m² General: alert and oriented, in no acute distress, alert, appears stated age and cooperative   Heart: regular rate and rhythm, S1, S2 normal, no murmur, click, rub or gallop   Lungs: clear to auscultation bilaterally, WNL respiratory effort, negative cough or SOB   Thyroid: Negative masses   Abdomen: soft, non-tender, without masses or organomegaly   Vulva: normal   Vagina: normal mucosa   Cervix: no cervical motion tenderness and no lesions   Uterus: normal size, non-tender, normal shape and consistency   Adnexa: normal adnexa   Urethra: normal   Breasts: NT,negative masses, discharge, or dimpling, bilateral nipple piercing   Negative fever

## 2020-09-28 ENCOUNTER — OFFICE VISIT (OUTPATIENT)
Dept: FAMILY MEDICINE CLINIC | Facility: CLINIC | Age: 30
End: 2020-09-28
Payer: COMMERCIAL

## 2020-09-28 VITALS
WEIGHT: 169 LBS | DIASTOLIC BLOOD PRESSURE: 70 MMHG | HEIGHT: 65 IN | TEMPERATURE: 97.3 F | BODY MASS INDEX: 28.16 KG/M2 | SYSTOLIC BLOOD PRESSURE: 128 MMHG

## 2020-09-28 DIAGNOSIS — R03.0 ELEVATED BLOOD PRESSURE READING: Primary | ICD-10-CM

## 2020-09-28 PROCEDURE — 3008F BODY MASS INDEX DOCD: CPT | Performed by: NURSE PRACTITIONER

## 2020-09-28 PROCEDURE — 99214 OFFICE O/P EST MOD 30 MIN: CPT | Performed by: FAMILY MEDICINE

## 2020-09-28 PROCEDURE — 4004F PT TOBACCO SCREEN RCVD TLK: CPT | Performed by: FAMILY MEDICINE

## 2020-09-28 RX ORDER — NORGESTIMATE AND ETHINYL ESTRADIOL 0.25-0.035
1 KIT ORAL DAILY
COMMUNITY
End: 2021-03-25 | Stop reason: ALTCHOICE

## 2020-09-28 NOTE — PROGRESS NOTES
Assessment/Plan:    Recommend diet exercise  Will keep an eye on her blood pressure follow-up in 3 or 4 months for recheck  She had lab work done in March of this year will repeat the lab work after ear  Diagnoses and all orders for this visit:    Elevated blood pressure reading    Other orders  -     norgestimate-ethinyl estradiol (ORTHO-CYCLEN) 0 25-35 MG-MCG per tablet; Take 1 tablet by mouth daily            Subjective:        Patient ID: Audrey Blackman is a 31 y o  female  She is in today she is feeling well  When she was at her annual examiner gynecologist noted her blood pressure was somewhat elevated  She otherwise well feels well  The following portions of the patient's history were reviewed and updated as appropriate: allergies, current medications, past family history, past medical history, past social history, past surgical history and problem list       Review of Systems   Constitutional: Negative  HENT: Negative  Eyes: Negative  Respiratory: Negative  Cardiovascular: Negative  Gastrointestinal: Negative  Endocrine: Negative  Genitourinary: Negative  Musculoskeletal: Negative  Skin: Negative  Allergic/Immunologic: Negative  Neurological: Negative  Hematological: Negative  Psychiatric/Behavioral: Negative  All other systems reviewed and are negative  Objective:      BMI Counseling: Body mass index is 28 12 kg/m²  The BMI is above normal  Nutrition recommendations include decreasing portion sizes, encouraging healthy choices of fruits and vegetables, decreasing fast food intake, consuming healthier snacks, limiting drinks that contain sugar, moderation in carbohydrate intake, increasing intake of lean protein, reducing intake of saturated and trans fat and reducing intake of cholesterol  Exercise recommendations include moderate physical activity 150 minutes/week  No pharmacotherapy was ordered         Tobacco Cessation Counseling: Tobacco cessation counseling was provided  The patient is sincerely urged to quit consumption of tobacco  She is not ready to quit tobacco          /70 (BP Location: Left arm, Patient Position: Sitting, Cuff Size: Standard)   Temp (!) 97 3 °F (36 3 °C) (Tympanic)   Ht 5' 5" (1 651 m)   Wt 76 7 kg (169 lb)   BMI 28 12 kg/m²          Physical Exam  Vitals signs and nursing note reviewed  Constitutional:       Appearance: She is well-developed  HENT:      Head: Normocephalic and atraumatic  Right Ear: External ear normal       Left Ear: External ear normal       Nose: Nose normal    Eyes:      Conjunctiva/sclera: Conjunctivae normal       Pupils: Pupils are equal, round, and reactive to light  Neck:      Musculoskeletal: Normal range of motion and neck supple  Cardiovascular:      Rate and Rhythm: Normal rate and regular rhythm  Heart sounds: Normal heart sounds  Pulmonary:      Effort: Pulmonary effort is normal       Breath sounds: Normal breath sounds  Abdominal:      General: Bowel sounds are normal       Palpations: Abdomen is soft  Musculoskeletal: Normal range of motion  Skin:     General: Skin is warm and dry  Neurological:      Mental Status: She is alert and oriented to person, place, and time  Deep Tendon Reflexes: Reflexes are normal and symmetric     Psychiatric:         Behavior: Behavior normal

## 2021-01-12 ENCOUNTER — OFFICE VISIT (OUTPATIENT)
Dept: OBGYN CLINIC | Facility: CLINIC | Age: 31
End: 2021-01-12

## 2021-01-12 VITALS
HEIGHT: 65 IN | WEIGHT: 174.2 LBS | SYSTOLIC BLOOD PRESSURE: 134 MMHG | BODY MASS INDEX: 29.02 KG/M2 | HEART RATE: 99 BPM | DIASTOLIC BLOOD PRESSURE: 80 MMHG

## 2021-01-12 DIAGNOSIS — Z11.3 SCREEN FOR STD (SEXUALLY TRANSMITTED DISEASE): Primary | ICD-10-CM

## 2021-01-12 PROCEDURE — 87491 CHLMYD TRACH DNA AMP PROBE: CPT | Performed by: NURSE PRACTITIONER

## 2021-01-12 PROCEDURE — 3008F BODY MASS INDEX DOCD: CPT | Performed by: NURSE PRACTITIONER

## 2021-01-12 PROCEDURE — 87591 N.GONORRHOEAE DNA AMP PROB: CPT | Performed by: NURSE PRACTITIONER

## 2021-01-12 PROCEDURE — 4004F PT TOBACCO SCREEN RCVD TLK: CPT | Performed by: NURSE PRACTITIONER

## 2021-01-12 PROCEDURE — 99213 OFFICE O/P EST LOW 20 MIN: CPT | Performed by: NURSE PRACTITIONER

## 2021-01-12 NOTE — LETTER
2021    To Sukumar Orozco  : 1990      This letter is to advise you that your recent CULTURE results were reviewed by me and are NORMAL  Please contact the office for an appointment if you have any additional concerns      DOMONIQUE Reynolds

## 2021-01-12 NOTE — PATIENT INSTRUCTIONS
Remember safe sex and condom use  STD test results can take up to 2 weeks  Annual GYN exam is due after 9/8/2021    COVID-19 Instructions    If you are having any of the following:  Cough   Shortness of breath   Fever  If traveled within past 2 weeks internationally or to high risk [place] states  Or been in contact with someone that has     Please call either:   Your PCP office  [phone], option [phone]    They will screen you over the phone and direct you to the nearest appropriate testing location    DO NOT go to your PCP or OB office without calling first

## 2021-01-12 NOTE — PROGRESS NOTES
Assessment/Plan:         Diagnoses and all orders for this visit:    Screen for STD (sexually transmitted disease)  -     Chlamydia/GC amplified DNA by PCR      Plan  Remember safe sex and condom use  STD test results can take up to 2 weeks  Annual GYN exam is due after 9/8/2021  Pt verbalized understanding of all discussed  Subjective:      Patient ID: Gisel July is a 31 y o  female  HPI  Pt presents for testing for STD testing  Denies symptoms  Pt has a female partner currently, would like GC/Chlamydia, declined blood testing  Last annual GYN exam was 9/8/2020  Last WNL PAP and negative HPV was 7/2019    The following portions of the patient's history were reviewed and updated as appropriate: allergies, current medications, past family history, past medical history, past social history, past surgical history and problem list     Review of Systems      Pertinent items are note in the HPI    Objective:      /80   Pulse 99   Ht 5' 5" (1 651 m)   Wt 79 kg (174 lb 3 2 oz)   LMP 12/19/2020 (Exact Date)   BMI 28 99 kg/m²          Physical Exam  Vitals signs reviewed  Constitutional:       Appearance: Normal appearance  Eyes:      General:         Right eye: No discharge  Left eye: No discharge  Neck:      Musculoskeletal: Normal range of motion  Pulmonary:      Effort: Pulmonary effort is normal  No respiratory distress  Musculoskeletal: Normal range of motion  Neurological:      Mental Status: She is alert and oriented to person, place, and time  Psychiatric:         Mood and Affect: Mood normal          Behavior: Behavior normal          Thought Content:  Thought content normal        Negative cough or SOB

## 2021-01-17 LAB
C TRACH DNA SPEC QL NAA+PROBE: NEGATIVE
N GONORRHOEA DNA SPEC QL NAA+PROBE: NEGATIVE

## 2021-01-19 ENCOUNTER — CLINICAL SUPPORT (OUTPATIENT)
Dept: FAMILY MEDICINE CLINIC | Facility: CLINIC | Age: 31
End: 2021-01-19
Payer: COMMERCIAL

## 2021-01-19 DIAGNOSIS — Z23 ENCOUNTER FOR IMMUNIZATION: Primary | ICD-10-CM

## 2021-01-19 PROCEDURE — 90471 IMMUNIZATION ADMIN: CPT

## 2021-01-19 PROCEDURE — 90686 IIV4 VACC NO PRSV 0.5 ML IM: CPT

## 2021-03-14 ENCOUNTER — OFFICE VISIT (OUTPATIENT)
Dept: URGENT CARE | Age: 31
End: 2021-03-14
Payer: COMMERCIAL

## 2021-03-14 ENCOUNTER — HOSPITAL ENCOUNTER (EMERGENCY)
Facility: HOSPITAL | Age: 31
Discharge: HOME | End: 2021-03-14
Attending: EMERGENCY MEDICINE | Admitting: EMERGENCY MEDICINE
Payer: COMMERCIAL

## 2021-03-14 VITALS
DIASTOLIC BLOOD PRESSURE: 90 MMHG | RESPIRATION RATE: 18 BRPM | WEIGHT: 172.84 LBS | TEMPERATURE: 98.2 F | HEART RATE: 99 BPM | SYSTOLIC BLOOD PRESSURE: 153 MMHG | BODY MASS INDEX: 28.76 KG/M2 | OXYGEN SATURATION: 98 %

## 2021-03-14 VITALS
RESPIRATION RATE: 18 BRPM | SYSTOLIC BLOOD PRESSURE: 135 MMHG | DIASTOLIC BLOOD PRESSURE: 82 MMHG | TEMPERATURE: 98 F | HEART RATE: 74 BPM | OXYGEN SATURATION: 100 % | WEIGHT: 169 LBS | HEIGHT: 65 IN | BODY MASS INDEX: 28.16 KG/M2

## 2021-03-14 DIAGNOSIS — N89.8 VAGINAL DISCHARGE: Primary | ICD-10-CM

## 2021-03-14 DIAGNOSIS — N89.8 VAGINAL IRRITATION: Primary | ICD-10-CM

## 2021-03-14 LAB
SL AMB  POCT GLUCOSE, UA: NORMAL
SL AMB LEUKOCYTE ESTERASE,UA: NORMAL
SL AMB POCT BILIRUBIN,UA: NORMAL
SL AMB POCT BLOOD,UA: NORMAL
SL AMB POCT CLARITY,UA: CLEAR
SL AMB POCT COLOR,UA: YELLOW
SL AMB POCT KETONES,UA: NORMAL
SL AMB POCT NITRITE,UA: NORMAL
SL AMB POCT PH,UA: 7.5
SL AMB POCT SPECIFIC GRAVITY,UA: 1.01
SL AMB POCT URINE HCG: NEGATIVE
SL AMB POCT URINE PROTEIN: NORMAL
SL AMB POCT UROBILINOGEN: 0.2

## 2021-03-14 PROCEDURE — 87086 URINE CULTURE/COLONY COUNT: CPT | Performed by: PHYSICIAN ASSISTANT

## 2021-03-14 PROCEDURE — 99283 EMERGENCY DEPT VISIT LOW MDM: CPT | Performed by: PHYSICIAN ASSISTANT

## 2021-03-14 PROCEDURE — G0382 LEV 3 HOSP TYPE B ED VISIT: HCPCS | Performed by: PHYSICIAN ASSISTANT

## 2021-03-14 PROCEDURE — 87591 N.GONORRHOEAE DNA AMP PROB: CPT | Performed by: PHYSICIAN ASSISTANT

## 2021-03-14 PROCEDURE — 81025 URINE PREGNANCY TEST: CPT | Performed by: PHYSICIAN ASSISTANT

## 2021-03-14 PROCEDURE — 99284 EMERGENCY DEPT VISIT MOD MDM: CPT | Performed by: PHYSICIAN ASSISTANT

## 2021-03-14 PROCEDURE — 81002 URINALYSIS NONAUTO W/O SCOPE: CPT | Performed by: PHYSICIAN ASSISTANT

## 2021-03-14 PROCEDURE — 87491 CHLMYD TRACH DNA AMP PROBE: CPT | Performed by: PHYSICIAN ASSISTANT

## 2021-03-14 PROCEDURE — 99283 EMERGENCY DEPT VISIT LOW MDM: CPT

## 2021-03-14 RX ORDER — METRONIDAZOLE 500 MG/1
500 TABLET ORAL EVERY 12 HOURS SCHEDULED
Qty: 14 TABLET | Refills: 0 | Status: SHIPPED | OUTPATIENT
Start: 2021-03-14 | End: 2021-03-21

## 2021-03-14 RX ORDER — FLUCONAZOLE 150 MG/1
150 TABLET ORAL ONCE
Status: COMPLETED | OUTPATIENT
Start: 2021-03-14 | End: 2021-03-14

## 2021-03-14 RX ADMIN — FLUCONAZOLE 150 MG: 150 TABLET ORAL at 18:58

## 2021-03-14 NOTE — PROGRESS NOTES
[facility]        NAME: Saida Bland is a 36 y o  female  : 1990    MRN: [id number]  DATE: 2021  TIME: 1:48 PM    Assessment and Plan   Vaginal irritation [N89 8]  1  Vaginal irritation  POCT urine dip    POCT urine HCG    Urine culture    Chlamydia/GC amplified DNA by PCR     UA: Negative, HCG: negative  Instructed pt to follow up with OBGYN at earliest availability for further evaluation  Patient Instructions       Follow up with PCP in 3-5 days  Proceed to  ER if symptoms worsen  Chief Complaint     Chief Complaint   Patient presents with    Possible UTI     pt states she has a history of uti, bv and yeast infection  woke up this morning and feels "something isnt right down there"  no fever, chills  History of Present Illness       Patient is c/o vaginal irritation  Pt reports it "just doesn't feel right down there"  Patient denies any vaginal discharge  Pt reports itching  Pt believes she has BV or Yeast Vaginitis  Pt denies any urinary symptoms  No fever, chills, N/V  Review of Systems   Review of Systems   Constitutional: Negative for chills and fever  HENT: Negative for ear pain and sore throat  Eyes: Negative for pain and visual disturbance  Respiratory: Negative for cough and shortness of breath  Cardiovascular: Negative for chest pain and palpitations  Gastrointestinal: Negative for abdominal pain and vomiting  Genitourinary: Positive for vaginal pain  Negative for dysuria and hematuria  Musculoskeletal: Negative for arthralgias and back pain  Skin: Negative for color change and rash  Neurological: Negative for seizures and syncope  All other systems reviewed and are negative          Current Medications       Current Outpatient Medications:     norgestimate-ethinyl estradiol (ORTHO-CYCLEN) 0 25-35 MG-MCG per tablet, Take 1 tablet by mouth daily, Disp: , Rfl:     Current Allergies     Allergies as of 2021    (No Known Allergies)            The following portions of the patient's history were reviewed and updated as appropriate: allergies, current medications, past family history, past medical history, past social history, past surgical history and problem list      Past Medical History:   Diagnosis Date    Chlamydia        Past Surgical History:   Procedure Laterality Date    INDUCED       Surgically induced  Description: 2012        Family History   Problem Relation Age of Onset    Hypertension Mother     Thyroid disease Mother     Asthma Mother          Medications have been verified  Objective   /82   Pulse 74   Temp 98 °F (36 7 °C)   Resp 18   Ht 5' 5" (1 651 m)   Wt 76 7 kg (169 lb)   LMP 2021 (Exact Date)   SpO2 100%   BMI 28 12 kg/m²   Patient's last menstrual period was 2021 (exact date)  Physical Exam     Physical Exam  Constitutional:       Appearance: Normal appearance  HENT:      Head: Normocephalic and atraumatic  Nose: Nose normal       Mouth/Throat:      Mouth: Mucous membranes are moist    Eyes:      Extraocular Movements: Extraocular movements intact  Conjunctiva/sclera: Conjunctivae normal       Pupils: Pupils are equal, round, and reactive to light  Neck:      Musculoskeletal: Normal range of motion and neck supple  Cardiovascular:      Rate and Rhythm: Normal rate  Pulmonary:      Effort: Pulmonary effort is normal    Musculoskeletal: Normal range of motion  Skin:     General: Skin is warm and dry  Capillary Refill: Capillary refill takes less than 2 seconds  Neurological:      General: No focal deficit present  Mental Status: She is alert and oriented to person, place, and time     Psychiatric:         Mood and Affect: Mood normal          Behavior: Behavior normal

## 2021-03-14 NOTE — ED PROVIDER NOTES
History  Chief Complaint   Patient presents with    Vaginal Discharge     Reports white vaginal discharge and itchiness since yesterday  Also reports odor  Hx of BV and yeast infections  More BV infections in the past than anything  No urinary sx  Patient is a 35 y/o female with no significant PMH who presents for evaluation of vaginal discharge  She complains of vaginal itch and irritation and thick white discharge that started yesterday  No odor  Has had both yeast infection and BV in the past  She tried Nutrablast yesterday which she feels like made her symptoms worse  No new partners  No concern for STD  Had negative GC/Chlamydia with GYN in   Denies pain with intercourse  No recent abx  Takes probiotics and cranberry tablets  No new soaps, lotion, detergents  No fever, chills, nausea, vomiting, diarrhea, chest pain, shortness, of breath, abdominal pain, dysuria, hematuria, frequency, vaginal bleeding  LMP- Feb 19  Went to urgent care today, sent here because they couldn't do a pelvic but did send urine for GC/chylamydia and dip/preg  Prior to Admission Medications   Prescriptions Last Dose Informant Patient Reported? Taking?   norgestimate-ethinyl estradiol (ORTHO-CYCLEN) 0 25-35 MG-MCG per tablet  Self Yes No   Sig: Take 1 tablet by mouth daily      Facility-Administered Medications: None       Past Medical History:   Diagnosis Date    Chlamydia        Past Surgical History:   Procedure Laterality Date    INDUCED       Surgically induced  Description: 2012        Family History   Problem Relation Age of Onset    Hypertension Mother     Thyroid disease Mother     Asthma Mother      I have reviewed and agree with the history as documented      E-Cigarette/Vaping    E-Cigarette Use Never User      E-Cigarette/Vaping Substances    Nicotine No     THC No     CBD No     Flavoring No     Other No     Unknown No      Social History     Tobacco Use    Smoking status: Never Smoker    Smokeless tobacco: Former User    Tobacco comment: hookah   Substance Use Topics    Alcohol use: Yes     Comment: social    Drug use: No       Review of Systems   Constitutional: Negative for chills and fever  Respiratory: Negative for shortness of breath  Cardiovascular: Negative for chest pain  Gastrointestinal: Negative for abdominal pain, diarrhea, nausea and vomiting  Genitourinary: Positive for vaginal discharge  Negative for dysuria, frequency, hematuria, vaginal bleeding and vaginal pain  Skin: Negative for rash  All other systems reviewed and are negative  Physical Exam  Physical Exam  Vitals signs and nursing note reviewed  Exam conducted with a chaperone present  Constitutional:       General: She is not in acute distress  Appearance: Normal appearance  She is normal weight  She is not ill-appearing, toxic-appearing or diaphoretic  HENT:      Head: Normocephalic and atraumatic  Right Ear: External ear normal       Left Ear: External ear normal    Eyes:      Conjunctiva/sclera: Conjunctivae normal    Neck:      Musculoskeletal: Normal range of motion  Cardiovascular:      Rate and Rhythm: Normal rate and regular rhythm  Heart sounds: Normal heart sounds  No murmur  Pulmonary:      Effort: Pulmonary effort is normal  No respiratory distress  Breath sounds: Normal breath sounds  No stridor  No wheezing or rhonchi  Abdominal:      General: Abdomen is flat  Bowel sounds are normal  There is no distension  Palpations: Abdomen is soft  Tenderness: There is no abdominal tenderness  There is no guarding  Genitourinary:     Pubic Area: No rash or pubic lice  Comments:  exam performed in the presence of female nurse chaperone  External genitalia normal  No rashes or lesions  Speculum exam reveals moderate amt of white discharge without odor  Cervix visualized without friability  Bimanual exam without CMT   No adnexal tenderness or masses palpated  Musculoskeletal: Normal range of motion  Skin:     General: Skin is warm and dry  Neurological:      General: No focal deficit present  Mental Status: She is alert  Psychiatric:         Mood and Affect: Mood normal          Vital Signs  ED Triage Vitals [03/14/21 1746]   Temperature Pulse Respirations Blood Pressure SpO2   98 2 °F (36 8 °C) 99 18 153/90 98 %      Temp Source Heart Rate Source Patient Position - Orthostatic VS BP Location FiO2 (%)   Oral -- -- -- --      Pain Score       No Pain           Vitals:    03/14/21 1746   BP: 153/90   Pulse: 99         Visual Acuity      ED Medications  Medications   fluconazole (DIFLUCAN) tablet 150 mg (150 mg Oral Given 3/14/21 1858)       Diagnostic Studies  Results Reviewed     None                 No orders to display              Procedures  Procedures         ED Course                             SBIRT 22yo+      Most Recent Value   SBIRT (22 yo +)   In order to provide better care to our patients, we are screening all of our patients for alcohol and drug use  Would it be okay to ask you these screening questions? No Filed at: 03/14/2021 1858                    MDM  Number of Diagnoses or Management Options  Vaginal discharge:   Diagnosis management comments: Patient had urine dip and preg done at urgent care which were unremarkable/negative  GC/Chlamydia was sent from urgent care however patient states low risk, no new partners and had negative testing in Jan 2021  There is thick white vaginal discharge and itch noted  Will treat with diflucan here  Patient also has long hx of recurrent BV- will also give rx for flagyl to start if no improvement after diflucan  Also advised patient to follow up with her OBGYN in 1-2 days, call to schedule an appointment  Discussed strict return precautions if symptoms worsen or new symptoms arise  Patient states understanding and agrees with plan          Amount and/or Complexity of Data Reviewed  Decide to obtain previous medical records or to obtain history from someone other than the patient: yes    Patient Progress  Patient progress: stable      Disposition  Final diagnoses:   Vaginal discharge     Time reflects when diagnosis was documented in both MDM as applicable and the Disposition within this note     Time User Action Codes Description Comment    3/14/2021  6:50 PM Ladonna Lucas Add [N89 8] Vaginal discharge       ED Disposition     ED Disposition Condition Date/Time Comment    Discharge Stable Sun Mar 14, 2021  6:50 PM Virgen Quick discharge to home/self care  Follow-up Information     Follow up With Specialties Details Why Contact Info Additional Information    Mauro Hutchison DO Family Medicine Schedule an appointment as soon as possible for a visit   [phone]       Follow up with your OBGYN in 1-2 days         [address] [facility] Emergency Medicine  If symptoms worsen [facility] [id number]  [address] [facility] Department, [address] [place] [address], [id number]          Discharge Medication List as of 3/14/2021  6:54 PM      START taking these medications    Details   metroNIDAZOLE (FLAGYL) 500 mg tablet Take 1 tablet (500 mg total) by mouth every 12 (twelve) hours for 7 days, Starting Sun 3/14/2021, Until Sun 3/21/2021, Normal         CONTINUE these medications which have NOT CHANGED    Details   norgestimate-ethinyl estradiol (ORTHO-CYCLEN) 0 25-35 MG-MCG per tablet Take 1 tablet by mouth daily, Historical Med           No discharge procedures on file      PDMP Review     None          ED Provider  Electronically Signed by           Shawanad Rodriguez PA-C  03/14/21 4665

## 2021-03-16 LAB
BACTERIA UR CULT: NORMAL
C TRACH DNA SPEC QL NAA+PROBE: NEGATIVE
N GONORRHOEA DNA SPEC QL NAA+PROBE: NEGATIVE

## 2021-03-25 ENCOUNTER — APPOINTMENT (OUTPATIENT)
Dept: LAB | Facility: HOSPITAL | Age: 31
End: 2021-03-25
Payer: COMMERCIAL

## 2021-03-25 ENCOUNTER — OFFICE VISIT (OUTPATIENT)
Dept: FAMILY MEDICINE CLINIC | Facility: CLINIC | Age: 31
End: 2021-03-25
Payer: COMMERCIAL

## 2021-03-25 VITALS
TEMPERATURE: 97.5 F | HEART RATE: 88 BPM | DIASTOLIC BLOOD PRESSURE: 80 MMHG | WEIGHT: 169 LBS | RESPIRATION RATE: 15 BRPM | BODY MASS INDEX: 28.16 KG/M2 | SYSTOLIC BLOOD PRESSURE: 118 MMHG | HEIGHT: 65 IN

## 2021-03-25 DIAGNOSIS — Z11.1 SCREENING-PULMONARY TB: ICD-10-CM

## 2021-03-25 DIAGNOSIS — Z00.00 WELL ADULT EXAM: ICD-10-CM

## 2021-03-25 DIAGNOSIS — Z00.00 ANNUAL PHYSICAL EXAM: ICD-10-CM

## 2021-03-25 DIAGNOSIS — Z00.00 WELL ADULT EXAM: Primary | ICD-10-CM

## 2021-03-25 PROCEDURE — 1036F TOBACCO NON-USER: CPT | Performed by: FAMILY MEDICINE

## 2021-03-25 PROCEDURE — 36415 COLL VENOUS BLD VENIPUNCTURE: CPT

## 2021-03-25 PROCEDURE — 3725F SCREEN DEPRESSION PERFORMED: CPT | Performed by: FAMILY MEDICINE

## 2021-03-25 PROCEDURE — 99395 PREV VISIT EST AGE 18-39: CPT | Performed by: FAMILY MEDICINE

## 2021-03-25 PROCEDURE — 3008F BODY MASS INDEX DOCD: CPT | Performed by: FAMILY MEDICINE

## 2021-03-25 PROCEDURE — 86480 TB TEST CELL IMMUN MEASURE: CPT

## 2021-03-25 NOTE — PROGRESS NOTES
[facility]    NAME: Ana Kunz  AGE: 36 y o  SEX: female  : 1990     DATE: 3/25/2021     Assessment and Plan:     Problem List Items Addressed This Visit        Other    Well adult exam - Primary    Relevant Orders    Quantiferon TB Gold Plus      Other Visit Diagnoses     Screening-pulmonary TB              Immunizations and preventive care screenings were discussed with patient today  Appropriate education was printed on patient's after visit summary  Counseling:  Alcohol/drug use: discussed moderation in alcohol intake, the recommendations for healthy alcohol use, and avoidance of illicit drug use  Dental Health: discussed importance of regular tooth brushing, flossing, and dental visits  Injury prevention: discussed safety/seat belts, safety helmets, smoke detectors, carbon dioxide detectors, and smoking near bedding or upholstery  Sexual health: discussed sexually transmitted diseases, partner selection, use of condoms, avoidance of unintended pregnancy, and contraceptive alternatives  · Exercise: the importance of regular exercise/physical activity was discussed  Recommend exercise 3-5 times per week for at least 30 minutes  BMI Counseling: Body mass index is 28 12 kg/m²  The BMI is above normal  Nutrition recommendations include decreasing portion sizes, encouraging healthy choices of fruits and vegetables, decreasing fast food intake, consuming healthier snacks, limiting drinks that contain sugar, moderation in carbohydrate intake, increasing intake of lean protein, reducing intake of saturated and trans fat and reducing intake of cholesterol  Exercise recommendations include moderate physical activity 150 minutes/week  No pharmacotherapy was ordered  No follow-ups on file       Chief Complaint:     Chief Complaint   Patient presents with    Physical Exam     work physical       History of Present Illness:     Adult Annual Physical   Patient here for a comprehensive physical exam  The patient reports no problems  Diet and Physical Activity  · Diet/Nutrition: well balanced diet, heart healthy (low sodium) diet and consuming 3-5 servings of fruits/vegetables daily  · Exercise: walking  Depression Screening  PHQ-9 Depression Screening    PHQ-9:   Frequency of the following problems over the past two weeks:      Little interest or pleasure in doing things: 0 - not at all  Feeling down, depressed, or hopeless: 0 - not at all       [address] [place] [address]  · Sleep: sleeps well  · Hearing: normal - bilateral   · Vision: no vision problems  · Dental: regular dental visits  /GYN Health  · Last menstrual period: 2021  · Contraceptive method: none  · History of STDs?: yes  Review of Systems:     Review of Systems   Constitutional: Negative  HENT: Negative  Eyes: Negative  Respiratory: Negative  Cardiovascular: Negative  Gastrointestinal: Negative  Endocrine: Negative  Genitourinary: Negative  Musculoskeletal: Negative  Skin: Negative  Allergic/Immunologic: Negative  Neurological: Negative  Hematological: Negative  Psychiatric/Behavioral: Negative  All other systems reviewed and are negative  Past Medical History:     Past Medical History:   Diagnosis Date    Chlamydia       Past Surgical History:     Past Surgical History:   Procedure Laterality Date    INDUCED       Surgically induced   Description: 2012       Social History:     E-Cigarette/Vaping    E-Cigarette Use Never User      E-Cigarette/Vaping Substances    Nicotine No     THC No     CBD No     Flavoring No     Other No     Unknown No      Social History     Socioeconomic History    Marital status: Single     Spouse name: None    Number of children: None    Years of education: None    Highest education level: None   Occupational History     Employer: [address] [place] [address] [employer]   Social Needs    Financial resource strain: Not hard at all   [employer] insecurity     Worry: Never true     Inability: Never true   [employer] needs     Medical: No     Non-medical: No   Tobacco Use    Smoking status: Never Smoker    Smokeless tobacco: Former User    Tobacco comment: hookah   Substance and Sexual Activity    Alcohol use: Yes     Comment: social    Drug use: No    Sexual activity: Yes     Partners: Female   Lifestyle    Physical activity     Days per week: 4 days     Minutes per session: 30 min    Stress: Not at all   Relationships    Social connections     Talks on phone: More than three times a week     Gets together: More than three times a week     Attends Faith service: Never     Active member of club or organization: No     Attends meetings of clubs or organizations: Never     Relationship status: Never     Intimate partner violence     Fear of current or ex partner: No     Emotionally abused: No     Physically abused: No     Forced sexual activity: No   Other Topics Concern    None   Social History Narrative    None      Family History:     Family History   Problem Relation Age of Onset    Hypertension Mother     Thyroid disease Mother     Asthma Mother       Current Medications:     No current outpatient medications on file  No current facility-administered medications for this visit  Allergies:     No Known Allergies   Physical Exam:     /80 (BP Location: Right arm, Patient Position: Sitting, Cuff Size: Standard)   Pulse 88   Temp 97 5 °F (36 4 °C)   Resp 15   Ht 5' 5" (1 651 m)   Wt 76 7 kg (169 lb)   LMP 03/03/2021 (Exact Date)   BMI 28 12 kg/m²     Physical Exam  Vitals signs and nursing note reviewed  Constitutional:       Appearance: She is well-developed  HENT:      Head: Normocephalic        Nose: Nose normal       Mouth/Throat:      Mouth: Mucous membranes are moist    Eyes:      Pupils: Pupils are equal, round, and reactive to light  Neck:      Musculoskeletal: Normal range of motion and neck supple  Cardiovascular:      Rate and Rhythm: Normal rate and regular rhythm  Heart sounds: Normal heart sounds  Pulmonary:      Effort: Pulmonary effort is normal       Breath sounds: Normal breath sounds  Abdominal:      General: Bowel sounds are normal       Palpations: Abdomen is soft  Skin:     General: Skin is warm and dry  Capillary Refill: Capillary refill takes less than 2 seconds  Neurological:      Mental Status: She is alert and oriented to person, place, and time     Psychiatric:         Behavior: Behavior normal           Rola Raines DO   [address]

## 2021-03-29 LAB
GAMMA INTERFERON BACKGROUND BLD IA-ACNC: 0.03 IU/ML
M TB IFN-G BLD-IMP: NEGATIVE
M TB IFN-G CD4+ BCKGRND COR BLD-ACNC: 0 IU/ML
M TB IFN-G CD4+ BCKGRND COR BLD-ACNC: 0 IU/ML
MITOGEN IGNF BCKGRD COR BLD-ACNC: >10 IU/ML

## 2021-03-31 DIAGNOSIS — Z23 ENCOUNTER FOR IMMUNIZATION: ICD-10-CM

## 2021-04-22 ENCOUNTER — OCCMED (OUTPATIENT)
Dept: URGENT CARE | Age: 31
End: 2021-04-22

## 2021-04-22 ENCOUNTER — APPOINTMENT (OUTPATIENT)
Dept: LAB | Age: 31
End: 2021-04-22

## 2021-04-22 DIAGNOSIS — Z00.8 SPECIAL EXAMINATIONS: Primary | ICD-10-CM

## 2021-04-22 DIAGNOSIS — Z00.8 SPECIAL EXAMINATIONS: ICD-10-CM

## 2021-04-22 PROCEDURE — 86480 TB TEST CELL IMMUN MEASURE: CPT

## 2021-04-22 PROCEDURE — 36415 COLL VENOUS BLD VENIPUNCTURE: CPT

## 2021-04-26 LAB
GAMMA INTERFERON BACKGROUND BLD IA-ACNC: 0.02 IU/ML
M TB IFN-G BLD-IMP: NEGATIVE
M TB IFN-G CD4+ BCKGRND COR BLD-ACNC: -0.01 IU/ML
M TB IFN-G CD4+ BCKGRND COR BLD-ACNC: 0.01 IU/ML
MITOGEN IGNF BCKGRD COR BLD-ACNC: >10 IU/ML

## 2021-05-12 ENCOUNTER — OFFICE VISIT (OUTPATIENT)
Dept: OBGYN CLINIC | Facility: CLINIC | Age: 31
End: 2021-05-12

## 2021-05-12 VITALS
BODY MASS INDEX: 27.62 KG/M2 | DIASTOLIC BLOOD PRESSURE: 86 MMHG | SYSTOLIC BLOOD PRESSURE: 128 MMHG | HEART RATE: 80 BPM | WEIGHT: 166 LBS

## 2021-05-12 DIAGNOSIS — B37.3 YEAST VAGINITIS: Primary | ICD-10-CM

## 2021-05-12 PROCEDURE — 99212 OFFICE O/P EST SF 10 MIN: CPT | Performed by: OBSTETRICS & GYNECOLOGY

## 2021-05-12 NOTE — PROGRESS NOTES
OB/GYN VISIT  Kenyetta White  2021  1:48 PM      Subjective:     Kenyetta White is a 36 y o   female who presents for evaluation of vaginal itching and irritation for 3 days  She states the irritation started after using a different brand of tampon  Since, she has had vulvar and labial pruritis  Patient is currently on her period and has not distinctly noticed discharge  She denies foul smelling odor, urinary symptoms and new sexual partners  She was treated for BV in March  Objective:    Vitals: Blood pressure 128/86, pulse 80, weight 75 3 kg (166 lb), last menstrual period 2021, not currently breastfeeding  Body mass index is 27 62 kg/m²  Past Medical History:   Diagnosis Date    Chlamydia      Past Surgical History:   Procedure Laterality Date    INDUCED       Surgically induced  Description: 2012        Physical Exam  Constitutional:       Appearance: Normal appearance  HENT:      Head: Normocephalic and atraumatic  Neck:      Musculoskeletal: Normal range of motion  Abdominal:      Palpations: Abdomen is soft  Tenderness: There is no abdominal tenderness  There is no guarding or rebound  Genitourinary:     Labia:         Right: Tenderness present  No lesion  Left: Tenderness present  No lesion  Vagina: Bleeding present  Cervix: Cervical bleeding present  No cervical motion tenderness, discharge, friability or erythema  Uterus: Normal        Adnexa: Right adnexa normal and left adnexa normal       Comments: Erythema noted on her labia minora where patient has been scratching   Patient is on her period   Neurological:      General: No focal deficit present  Mental Status: She is alert  Mental status is at baseline     Psychiatric:         Mood and Affect: Mood normal            Assessment/Plan:    Kenyetta White is a 36 y o   female with yeast vaginitis     Yeast Vaginitis   Negative Whiff   No clue cells, trichomonads on wet prep   KOH positive for pseudohyphae   Vaginal itching and irritation   Discussed vaginal hygiene including no hash soaps, douching, avoidance of tight clothing, wearing cotton underwear  Patient to abstain from intercourse during treatment     Treat with 7 days of miconazole   Patient to RTC  As needed, or with new or worsening symptoms     D/w Dr Andrew Saenz MD  5/12/2021  1:48 PM

## 2021-07-08 ENCOUNTER — HOSPITAL ENCOUNTER (EMERGENCY)
Facility: HOSPITAL | Age: 31
Discharge: HOME | End: 2021-07-08
Attending: EMERGENCY MEDICINE | Admitting: EMERGENCY MEDICINE
Payer: COMMERCIAL

## 2021-07-08 VITALS
BODY MASS INDEX: 13.22 KG/M2 | SYSTOLIC BLOOD PRESSURE: 131 MMHG | HEART RATE: 105 BPM | TEMPERATURE: 98.6 F | DIASTOLIC BLOOD PRESSURE: 90 MMHG | OXYGEN SATURATION: 99 % | RESPIRATION RATE: 16 BRPM | WEIGHT: 79.44 LBS

## 2021-07-08 DIAGNOSIS — J06.9 VIRAL UPPER RESPIRATORY TRACT INFECTION: Primary | ICD-10-CM

## 2021-07-08 LAB — SARS-COV-2 RNA RESP QL NAA+PROBE: NEGATIVE

## 2021-07-08 PROCEDURE — U0003 INFECTIOUS AGENT DETECTION BY NUCLEIC ACID (DNA OR RNA); SEVERE ACUTE RESPIRATORY SYNDROME CORONAVIRUS 2 (SARS-COV-2) (CORONAVIRUS DISEASE [COVID-19]), AMPLIFIED PROBE TECHNIQUE, MAKING USE OF HIGH THROUGHPUT TECHNOLOGIES AS DESCRIBED BY CMS-2020-01-R: HCPCS | Performed by: EMERGENCY MEDICINE

## 2021-07-08 PROCEDURE — 99284 EMERGENCY DEPT VISIT MOD MDM: CPT | Performed by: EMERGENCY MEDICINE

## 2021-07-08 PROCEDURE — 99283 EMERGENCY DEPT VISIT LOW MDM: CPT

## 2021-07-08 PROCEDURE — U0005 INFEC AGEN DETEC AMPLI PROBE: HCPCS | Performed by: EMERGENCY MEDICINE

## 2021-07-08 RX ORDER — PSEUDOEPHEDRINE HCL 120 MG/1
120 TABLET, FILM COATED, EXTENDED RELEASE ORAL EVERY 12 HOURS
Qty: 30 TABLET | Refills: 0 | Status: SHIPPED | OUTPATIENT
Start: 2021-07-08 | End: 2022-04-29

## 2021-07-08 RX ORDER — IBUPROFEN 600 MG/1
600 TABLET ORAL ONCE
Status: COMPLETED | OUTPATIENT
Start: 2021-07-08 | End: 2021-07-08

## 2021-07-08 RX ORDER — LIDOCAINE HYDROCHLORIDE 20 MG/ML
15 SOLUTION OROPHARYNGEAL ONCE
Status: COMPLETED | OUTPATIENT
Start: 2021-07-08 | End: 2021-07-08

## 2021-07-08 RX ADMIN — IBUPROFEN 600 MG: 600 TABLET, FILM COATED ORAL at 08:58

## 2021-07-08 RX ADMIN — LIDOCAINE HYDROCHLORIDE 15 ML: 20 SOLUTION ORAL; TOPICAL at 08:57

## 2021-07-08 NOTE — ED PROVIDER NOTES
History  Chief Complaint   Patient presents with    Cold Like Symptoms     yesterday began to feel a little like something coming on- chills, sore throat, and a fever 101 2 this morning  History provided by:  Patient  NINI  Presenting symptoms: congestion, cough, fatigue, fever and rhinorrhea    Presenting symptoms: no sore throat    Severity:  Moderate  Onset quality:  Gradual  Timing:  Constant  Progression:  Worsening  Chronicity:  New  Relieved by:  Nothing  Worsened by:  Nothing  Ineffective treatments:  None tried  Associated symptoms: myalgias    Associated symptoms: no headaches and no wheezing        Prior to Admission Medications   Prescriptions Last Dose Informant Patient Reported? Taking?   miconazole (MONISTAT 7) 100 mg vaginal suppository   No No   Sig: Insert 1 suppository (100 mg total) into the vagina daily at bedtime      Facility-Administered Medications: None       Past Medical History:   Diagnosis Date    Chlamydia        Past Surgical History:   Procedure Laterality Date    INDUCED       Surgically induced  Description: 2012        Family History   Problem Relation Age of Onset    Hypertension Mother     Thyroid disease Mother     Asthma Mother      I have reviewed and agree with the history as documented  E-Cigarette/Vaping    E-Cigarette Use Never User      E-Cigarette/Vaping Substances    Nicotine No     THC No     CBD No     Flavoring No     Other No     Unknown No      Social History     Tobacco Use    Smoking status: Never Smoker    Smokeless tobacco: Former User    Tobacco comment: hookah   Vaping Use    Vaping Use: Never used   Substance Use Topics    Alcohol use: Yes     Comment: social    Drug use: No       Review of Systems   Constitutional: Positive for fatigue and fever  Negative for chills  HENT: Positive for congestion and rhinorrhea  Negative for sore throat and trouble swallowing  Eyes: Negative for pain     Respiratory: Positive for cough  Negative for shortness of breath, wheezing and stridor  Cardiovascular: Negative for chest pain and leg swelling  Gastrointestinal: Negative for abdominal pain, diarrhea and nausea  Endocrine: Negative for polyuria  Genitourinary: Negative for dysuria, flank pain and urgency  Musculoskeletal: Positive for myalgias  Negative for joint swelling and neck stiffness  Skin: Negative for rash  Allergic/Immunologic: Negative for immunocompromised state  Neurological: Negative for dizziness, syncope, weakness, numbness and headaches  Psychiatric/Behavioral: Negative for confusion and suicidal ideas  All other systems reviewed and are negative  Physical Exam  Physical Exam  Vitals and nursing note reviewed  Constitutional:       Appearance: Normal appearance  She is well-developed  HENT:      Head: Normocephalic and atraumatic  Right Ear: A middle ear effusion is present  Tympanic membrane is not erythematous, retracted or bulging  Left Ear: A middle ear effusion is present  Tympanic membrane is not erythematous, retracted or bulging  Nose: Nose normal       Mouth/Throat:      Mouth: Mucous membranes are moist       Pharynx: Posterior oropharyngeal erythema present  Eyes:      Extraocular Movements: Extraocular movements intact  Pupils: Pupils are equal, round, and reactive to light  Cardiovascular:      Rate and Rhythm: Normal rate and regular rhythm  Heart sounds: No murmur heard  No friction rub  Pulmonary:      Effort: No respiratory distress  Breath sounds: Normal breath sounds  No wheezing or rales  Abdominal:      General: Bowel sounds are normal  There is no distension  Palpations: Abdomen is soft  Tenderness: There is no abdominal tenderness  Musculoskeletal:         General: No tenderness  Normal range of motion  Cervical back: Normal range of motion and neck supple  Skin:     General: Skin is warm        Findings: No rash    Neurological:      Mental Status: She is alert and oriented to person, place, and time  Psychiatric:         Mood and Affect: Mood normal          Vital Signs  ED Triage Vitals [07/08/21 0805]   Temperature Pulse Respirations Blood Pressure SpO2   98 6 °F (37 °C) 105 16 131/90 99 %      Temp Source Heart Rate Source Patient Position - Orthostatic VS BP Location FiO2 (%)   Tympanic Monitor Sitting Left arm --      Pain Score       7           Vitals:    07/08/21 0805   BP: 131/90   Pulse: 105   Patient Position - Orthostatic VS: Sitting         Visual Acuity      ED Medications  Medications   ibuprofen (MOTRIN) tablet 600 mg (600 mg Oral Given 7/8/21 0858)   Lidocaine Viscous HCl (XYLOCAINE) 2 % mucosal solution 15 mL (15 mL Swish & Swallow Given 7/8/21 0857)       Diagnostic Studies  Results Reviewed     Procedure Component Value Units Date/Time    [facility] - 2 Hour Stat [[id number]] Collected: 07/08/21 9068    Lab Status: In process Specimen: Nares from Nasopharyngeal Swab Updated: 07/08/21 0923                 No orders to display              Procedures  Procedures         ED Course                                           MDM  Number of Diagnoses or Management Options  Viral upper respiratory tract infection: new and requires workup  Diagnosis management comments: 30-year-old female presents emergency department with symptoms of nasal congestion rhinorrhea,    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  Noted with fever at home took Tylenol  In the emergency department this point time the patient is having no fever the plan will be for outpatient management and follow-up  Given strict instructions when to return back to the emergency department  Pt re-examined and evaluated after testing and treatment  Spoke with the patient and feeling improved and sxs have resolved   Will discharge home with close f/u with pcp and instructed to return to the ED if sxs worsen or continue  Pt agrees with the plan for discharge and feels comfortable to go home with proper f/u  Advised to return for worsening or additional problems  Diagnostic tests were reviewed and questions answered  Diagnosis, care plan and treatment options were discussed  The patient understand instructions and will follow up as directed  Counseling: I had a detailed discussion with the patient and/or guardian regarding: the historical points, exam findings, and any diagnostic results supporting the discharge diagnosis, lab results, radiology results, discharge instructions reviewed with patient and/or family/caregiver and understanding was verbalized  Instructions given to return to the emergency department if symptoms worsen or persist, or if there are any questions or concerns that arise at home  All labs reviewed and utilized in the medical decision making process    All radiology studies independently viewed by me and interpreted by the radiologist           Disposition  Final diagnoses:   Viral upper respiratory tract infection     Time reflects when diagnosis was documented in both MDM as applicable and the Disposition within this note     Time User Action Codes Description Comment    7/8/2021  8:44 AM Martha CHEEK Add [J06 9] Viral URI with cough     7/8/2021  8:44 AM Keven Vera Remove [J06 9] Viral URI with cough     7/8/2021  8:44 AM Jordon Alas Add [J06 9] Viral upper respiratory tract infection       ED Disposition     ED Disposition Condition Date/Time Comment    Discharge Stable Thu Jul 8, 2021  8:44 AM Pedro Luis Gonsales discharge to home/self care              Follow-up Information    None         Discharge Medication List as of 7/8/2021  8:46 AM      START taking these medications    Details   al mag oxide-diphenhydramine-lidocaine viscous (MAGIC MOUTHWASH) 1:1:1 suspension Swish and swallow 10 mL every 4 (four) hours as needed for mouth pain or discomfort, Starting Thu 7/8/2021, Normal      pseudoephedrine (SUDAFED) 120 MG 12 hr tablet Take 1 tablet (120 mg total) by mouth every 12 (twelve) hours, Starting Thu 7/8/2021, Normal         CONTINUE these medications which have NOT CHANGED    Details   miconazole (MONISTAT 7) 100 mg vaginal suppository Insert 1 suppository (100 mg total) into the vagina daily at bedtime, Starting Wed 5/12/2021, Normal           No discharge procedures on file      PDMP Review     None          ED Provider  Electronically Signed by           Hermelindo Reynaga DO  07/08/21 1004

## 2021-08-12 ENCOUNTER — OFFICE VISIT (OUTPATIENT)
Dept: DENTISTRY | Facility: CLINIC | Age: 31
End: 2021-08-12

## 2021-08-12 VITALS — SYSTOLIC BLOOD PRESSURE: 137 MMHG | TEMPERATURE: 96 F | HEART RATE: 81 BPM | DIASTOLIC BLOOD PRESSURE: 84 MMHG

## 2021-08-12 DIAGNOSIS — K02.9 CARIES: Primary | ICD-10-CM

## 2021-08-12 PROCEDURE — D9430 OFFICE VISIT FOR OBSERVATION (DURING REGULARLY SCHEDULED HOURS) - NO OTHER SERVICES PERFORMED: HCPCS | Performed by: DENTIST

## 2021-08-13 NOTE — PROGRESS NOTES
Pt presents for continuation of treatment on #19 following endo re-treat  PMH reviewed w/ [facility]  Pt reports that she got #19 re-treated in the past few months at [facility]  She did not report any pain today  She did point out that "the orange filling they put in broke a little recently "    Clinically, the temporary Fuji restoration was fractured especially on the distal portion  High speed drill used to remove all of this temporary filling as well as the cotton pellet over the canals  The canals were plugged with some sort of composite restoration  Pt anesthetized with topical benzocaine and 2 carpules of septocaine via local infiltration  Toffelmire matrix placed on #19  While isolated, composite build up done  Acid etch, rinse and bond/light cure used  The build up was needed today bc there was nothing to clamp to in order to drill the post space with a rubber dam on  The tooth has lost significant healthy tooth structure, especially on the distal and buccal      The pt understands that the delivery of crown #19 is about 4-5 appts away  She is also very interested in #30 implant after the crown #19 is finished       NV: prefab post and core #19

## 2021-09-01 ENCOUNTER — TELEPHONE (OUTPATIENT)
Dept: FAMILY MEDICINE CLINIC | Facility: CLINIC | Age: 31
End: 2021-09-01

## 2021-09-16 ENCOUNTER — ANNUAL EXAM (OUTPATIENT)
Dept: OBGYN CLINIC | Facility: CLINIC | Age: 31
End: 2021-09-16

## 2021-09-16 VITALS
DIASTOLIC BLOOD PRESSURE: 84 MMHG | WEIGHT: 169 LBS | HEIGHT: 65 IN | SYSTOLIC BLOOD PRESSURE: 130 MMHG | BODY MASS INDEX: 28.16 KG/M2 | HEART RATE: 73 BPM

## 2021-09-16 DIAGNOSIS — Z01.419 ENCOUNTER FOR ANNUAL ROUTINE GYNECOLOGICAL EXAMINATION: Primary | ICD-10-CM

## 2021-09-16 DIAGNOSIS — Z11.3 SCREEN FOR STD (SEXUALLY TRANSMITTED DISEASE): ICD-10-CM

## 2021-09-16 PROCEDURE — 99395 PREV VISIT EST AGE 18-39: CPT | Performed by: NURSE PRACTITIONER

## 2021-09-16 PROCEDURE — 87491 CHLMYD TRACH DNA AMP PROBE: CPT | Performed by: NURSE PRACTITIONER

## 2021-09-16 PROCEDURE — 87591 N.GONORRHOEAE DNA AMP PROB: CPT | Performed by: NURSE PRACTITIONER

## 2021-09-16 RX ORDER — MULTIVIT-MIN/IRON FUM/FOLIC AC 7.5 MG-4
1 TABLET ORAL DAILY
COMMUNITY

## 2021-09-16 NOTE — LETTER
2021    Muriel Ureña  [facility] [address]      2021    To Muriel Ureña  : 1990      This letter is to advise you that your recent CULTURE results were reviewed by me and are NORMAL  Please contact the office for an appointment if you have any additional concerns      DOMONIQUE Gonzalez

## 2021-09-16 NOTE — PATIENT INSTRUCTIONS
Call with needs or concerns  Return in 1 year    COVID-19 Instructions    If you are having any of the following:  Cough   Shortness of breath   Fever  If traveled within past 2 weeks internationally or to high risk [place] states  Or been in contact with someone that has     Please call either:   Your PCP office  [phone], option [phone]    They will screen you over the phone and direct you to the nearest appropriate testing location    DO NOT go to your PCP or OB office without calling first

## 2021-09-16 NOTE — PROGRESS NOTES
Annual Exam    Assessment   1  Encounter for annual routine gynecological examination     2  Screen for STD (sexually transmitted disease)  Chlamydia/GC amplified DNA by PCR     well woman       Plan       All questions answered  Breast self exam technique reviewed and patient encouraged to perform self-exam monthly  Contraception: none  Discussed healthy lifestyle modifications  Follow up in 1 years  Patient Instructions   Call with needs or concerns  Return in 1 year  Pt verbalized understanding of all discussed  Subjective      Dora Cartagena is a 36 y o   female who presents for annual well woman exam  Periods are regular every 28-30 days, lasting 5 days  No intermenstrual bleeding, spotting, or discharge  Last WNL PAP 2019  1 partner x 1 year, denies domestic violence  Wants testing for GC/Chlamydia for peace of mind, declined blood testing for STD's    Depression Screening Follow-up Plan: Patient's depression screening was negative with a PHQ-2 score of 0  Their PHQ-9 score was 0  Clinically patient does not have depression  No treatment is required  Current contraception: none, would accept a pregnancy if it occurred, discussed folic acid   History of abnormal Pap smear: no  Family history of uterine or ovarian cancer: no  Regular self breast exam: yes  History of abnormal mammogram: N/A  Family history of breast cancer: no  History of abnormal lipids: unknown    Menstrual History:  OB History        1    Para   0    Term                AB   1    Living   0       SAB        TAB   1    Ectopic        Multiple        Live Births                    Menarche age: 8  Patient's last menstrual period was 09/10/2021 (exact date)         The following portions of the patient's history were reviewed and updated as appropriate: allergies, current medications, past family history, past medical history, past social history, past surgical history and problem list     Review of Systems  Pertinent items are noted in HPI        Objective      /84   Pulse 73   Ht 5' 5" (1 651 m)   Wt 76 7 kg (169 lb)   LMP 09/10/2021 (Exact Date)   BMI 28 12 kg/m²     General: alert and oriented, in no acute distress, alert, appears stated age and cooperative   Heart: regular rate and rhythm, S1, S2 normal, no murmur, click, rub or gallop   Lungs: clear to auscultation bilaterally, WNL respiratory effort, negative cough or SOB   Thyroid: Negative masses   Abdomen: soft, non-tender, without masses or organomegaly   Vulva: normal   Vagina: normal mucosa   Cervix: no cervical motion tenderness and no lesions   Uterus: normal size, non-tender, normal shape and consistency   Adnexa: normal adnexa   Urethra: normal   Breasts: NT,negative masses, discharge, or dimpling, bilateral nipple piercing

## 2021-09-18 LAB
C TRACH DNA SPEC QL NAA+PROBE: NEGATIVE
N GONORRHOEA DNA SPEC QL NAA+PROBE: NEGATIVE

## 2021-12-20 DIAGNOSIS — Z20.822 EXPOSURE TO COVID-19 VIRUS: Primary | ICD-10-CM

## 2021-12-23 DIAGNOSIS — Z20.822 EXPOSURE TO COVID-19 VIRUS: Primary | ICD-10-CM

## 2021-12-27 ENCOUNTER — APPOINTMENT (OUTPATIENT)
Dept: LAB | Facility: HOSPITAL | Age: 31
End: 2021-12-27
Payer: COMMERCIAL

## 2021-12-28 ENCOUNTER — HOSPITAL ENCOUNTER (EMERGENCY)
Facility: HOSPITAL | Age: 31
Discharge: HOME | End: 2021-12-28
Attending: EMERGENCY MEDICINE
Payer: COMMERCIAL

## 2021-12-28 ENCOUNTER — TELEPHONE (OUTPATIENT)
Dept: FAMILY MEDICINE CLINIC | Facility: CLINIC | Age: 31
End: 2021-12-28

## 2021-12-28 VITALS
SYSTOLIC BLOOD PRESSURE: 151 MMHG | RESPIRATION RATE: 20 BRPM | HEART RATE: 117 BPM | OXYGEN SATURATION: 100 % | TEMPERATURE: 99 F | DIASTOLIC BLOOD PRESSURE: 72 MMHG

## 2021-12-28 DIAGNOSIS — Z20.822 SUSPECTED COVID-19 VIRUS INFECTION: Primary | ICD-10-CM

## 2021-12-28 LAB
FLUAV RNA RESP QL NAA+PROBE: NEGATIVE
FLUBV RNA RESP QL NAA+PROBE: NEGATIVE
RSV RNA RESP QL NAA+PROBE: NEGATIVE
SARS-COV-2 RNA RESP QL NAA+PROBE: POSITIVE

## 2021-12-28 PROCEDURE — 96372 THER/PROPH/DIAG INJ SC/IM: CPT

## 2021-12-28 PROCEDURE — 0241U HB NFCT DS VIR RESP RNA 4 TRGT: CPT | Performed by: EMERGENCY MEDICINE

## 2021-12-28 PROCEDURE — 99284 EMERGENCY DEPT VISIT MOD MDM: CPT | Performed by: EMERGENCY MEDICINE

## 2021-12-28 PROCEDURE — 99283 EMERGENCY DEPT VISIT LOW MDM: CPT

## 2021-12-28 RX ORDER — KETOROLAC TROMETHAMINE 30 MG/ML
15 INJECTION, SOLUTION INTRAMUSCULAR; INTRAVENOUS ONCE
Status: COMPLETED | OUTPATIENT
Start: 2021-12-28 | End: 2021-12-28

## 2021-12-28 RX ORDER — IBUPROFEN 600 MG/1
600 TABLET ORAL EVERY 6 HOURS PRN
Qty: 60 TABLET | Refills: 0 | Status: SHIPPED | OUTPATIENT
Start: 2021-12-28 | End: 2022-08-03

## 2021-12-28 RX ORDER — ACETAMINOPHEN 325 MG/1
975 TABLET ORAL ONCE
Status: COMPLETED | OUTPATIENT
Start: 2021-12-28 | End: 2021-12-28

## 2021-12-28 RX ADMIN — KETOROLAC TROMETHAMINE 15 MG: 30 INJECTION, SOLUTION INTRAMUSCULAR; INTRAVENOUS at 02:21

## 2021-12-28 RX ADMIN — ACETAMINOPHEN 975 MG: 325 TABLET, FILM COATED ORAL at 02:23

## 2021-12-28 NOTE — Clinical Note
Misa Yasmeen was seen and treated in our emergency department on 12/28/2021  Diagnosis:     [place]    She may return on this date: May return to work when cleared from covid-19 testing        If you have any questions or concerns, please don't hesitate to call        Leslee Álvarez PA-C    ______________________________           _______________          _______________  Hospital Representative                              Date                                Time

## 2021-12-28 NOTE — ED PROVIDER NOTES
SUBJECTIVE:   Karlos Francois is a 36 y o  female who presents with Generalized Body Aches (runny nose, fever, fatigue, headache)    has a past medical history of Chlamydia       Symptoms have been going on for 1 days of fever, chills, cough, headache, anorexia, fatigue and myalgias which is described at moderate The patient has had contact with people with similar symptoms  The patient has not taken any medication  No Known Allergies    Past Medical History:   Diagnosis Date    Chlamydia       Past Surgical History:   Procedure Laterality Date    INDUCED       Surgically induced  Description: 2012      Social History     Tobacco Use    Smoking status: Never Smoker    Smokeless tobacco: Former User    Tobacco comment: hookah   Vaping Use    Vaping Use: Never used   Substance Use Topics    Alcohol use: Yes     Comment: social    Drug use: Never       Nursing notes reviewed    ROS: Positive for fever, chills, sweats, headache, fatigue, myalgias, cough, the remainder of a 10 organ system ROS was otherwise unremarkable    General: awake, alert, no acute distress    OBJECTIVE:    ED Triage Vitals   Temperature Pulse Respirations Blood Pressure SpO2   21 0202 21   99 °F (37 2 °C) (!) 117 20 151/72 100 %      Temp Source Heart Rate Source Patient Position - Orthostatic VS BP Location FiO2 (%)   21 01521 0159 21 --   Oral Monitor Sitting Right arm       Pain Score       21       8              General: awake, alert, no acute distress  Head: normocephalic, atraumatic  Eyes: no scleral icterus  Ears: external ears normal, hearing grossly intact  Nose: external exam grossly normal, negative nasal discharge  Neck: symmetric, No JVD noted, trachea midline  Pulmonary: no respiratory distress, no tachypnea noted  Cardiovascular: appears well perfused  Abdomen: no distention noted  Musculoskeletal: no deformities noted, tone normal  Neuro: grossly non-focal  Psych: mood and affect appropriate            ASSESSMENT:  The patient is stable and has a history and physical exam consistent with a viral illness  COVID19 testing has been performed  I considered the patient's other medical conditions as applicable/noted above in my medical decision making  The patient is stable upon discharge  PLAN:  The plan is for supportive care at home  Symptomatic therapy suggested: rest, increase fluids, OTC acetaminophen, ibuprofen and call prn if symptoms persist or worsen  Call or go to PCP if these symptoms persist or fail to improve as anticipated  Return to ER precautions discussed as well  The patient (and any family present) verbalized understanding of the discharge instructions and warnings that would necessitate return to the Emergency Department  All questions were answered prior to discharge  Medications   ketorolac (TORADOL) injection 15 mg (15 mg Intramuscular Given 12/28/21 0221)   acetaminophen (TYLENOL) tablet 975 mg (975 mg Oral Given 12/28/21 0223)     Final diagnoses:   Suspected COVID-19 virus infection     Time reflects when diagnosis was documented in both MDM as applicable and the Disposition within this note     Time User Action Codes Description Comment    12/28/2021  2:15 AM Karyna PAYTON Add [Z20 822] Suspected COVID-19 virus infection       ED Disposition     ED Disposition Condition Date/Time Comment    Discharge Good Tu Dec 28, 2021  2:15 AM Celeste Chacon discharge to home/self care              Follow-up Information     Follow up With Specialties Details Why Contact Ten Hood DO Family Medicine Schedule an appointment as soon as possible for a visit  As needed Silvestre Dubose  [phone]          Discharge Medication List as of 12/28/2021  2:16 AM      START taking these medications    Details   ibuprofen (MOTRIN) 600 mg tablet Take 1 tablet (600 mg total) by mouth every 6 (six) hours as needed for mild pain, Starting Tue 12/28/2021, Normal         CONTINUE these medications which have NOT CHANGED    Details   Multiple Vitamins-Minerals (multivitamin with minerals) tablet Take 1 tablet by mouth daily, Historical Med      al mag oxide-diphenhydramine-lidocaine viscous (MAGIC MOUTHWASH) 1:1:1 suspension Swish and swallow 10 mL every 4 (four) hours as needed for mouth pain or discomfort, Starting Thu 7/8/2021, Normal      miconazole (MONISTAT 7) 100 mg vaginal suppository Insert 1 suppository (100 mg total) into the vagina daily at bedtime, Starting Wed 5/12/2021, Normal      pseudoephedrine (SUDAFED) 120 MG 12 hr tablet Take 1 tablet (120 mg total) by mouth every 12 (twelve) hours, Starting Thu 7/8/2021, Normal           No discharge procedures on file      Electronically Signed by     Kathleen Caraballo PA-C  12/28/21 8644

## 2022-01-03 ENCOUNTER — TELEPHONE (OUTPATIENT)
Dept: OBGYN CLINIC | Facility: CLINIC | Age: 32
End: 2022-01-03

## 2022-01-04 ENCOUNTER — TELEMEDICINE (OUTPATIENT)
Dept: OBGYN CLINIC | Facility: CLINIC | Age: 32
End: 2022-01-04

## 2022-01-04 DIAGNOSIS — L29.2 VULVAR ITCHING: ICD-10-CM

## 2022-01-04 DIAGNOSIS — N89.8 VAGINAL IRRITATION: Primary | ICD-10-CM

## 2022-01-04 PROCEDURE — 99213 OFFICE O/P EST LOW 20 MIN: CPT | Performed by: NURSE PRACTITIONER

## 2022-01-04 RX ORDER — FLUCONAZOLE 150 MG/1
150 TABLET ORAL ONCE
Qty: 1 TABLET | Refills: 1 | Status: SHIPPED | OUTPATIENT
Start: 2022-01-04 | End: 2022-01-04

## 2022-01-04 NOTE — PROGRESS NOTES
Virtual Regular Visit    Verification of patient location:    Patient is located in the following state in which I hold an active license [place]      Assessment/Plan:    Problem List Items Addressed This Visit     None               Reason for visit is No chief complaint on file  Encounter provider Kaur Mead    Provider located at [address] [address] [phone]      Recent Visits  Date Type Provider Dept   22 Telephone Kolby Dalton, [address] [place] [address] recent visits within past 7 days and meeting all other requirements  Future Appointments  No visits were found meeting these conditions  Showing future appointments within next 150 days and meeting all other requirements       The patient was identified by name and date of birth  Audrey Blackman was informed that this is a telemedicine visit and that the visit is being conducted through [address] Now and patient was informed that this is a secure, HIPAA-compliant platform  She agrees to proceed     My office door was closed  No one else was in the room  She acknowledged consent and understanding of privacy and security of the video platform  The patient has agreed to participate and understands they can discontinue the visit at any time  Patient is aware this is a billable service  Subjective  Audrey Blackman is a 36 y o  female who call with C/O vaginal irritaion and vulva itching   HPI   Pt called with C/O vulvar itching and vaginal irritation for several day  Pt has a Hx of a positive Covid test 2021    Explained symptoms soumd like a yeast infection, safe and effective use of Fluconazole was provided, comfort measures was discussed    Past Medical History:   Diagnosis Date    Chlamydia        Past Surgical History:   Procedure Laterality Date    INDUCED       Surgically induced   Description: 2012 Current Outpatient Medications   Medication Sig Dispense Refill    al mag oxide-diphenhydramine-lidocaine viscous (MAGIC MOUTHWASH) 1:1:1 suspension Swish and swallow 10 mL every 4 (four) hours as needed for mouth pain or discomfort 90 mL 0    ibuprofen (MOTRIN) 600 mg tablet Take 1 tablet (600 mg total) by mouth every 6 (six) hours as needed for mild pain 60 tablet 0    miconazole (MONISTAT 7) 100 mg vaginal suppository Insert 1 suppository (100 mg total) into the vagina daily at bedtime 7 suppository 0    Multiple Vitamins-Minerals (multivitamin with minerals) tablet Take 1 tablet by mouth daily      pseudoephedrine (SUDAFED) 120 MG 12 hr tablet Take 1 tablet (120 mg total) by mouth every 12 (twelve) hours 30 tablet 0     No current facility-administered medications for this visit  No Known Allergies    Review of Systems    Pertinent items are note in the HPI    Video Exam    There were no vitals filed for this visit  Physical Exam   Alert and oriented  WNL respiratory effort when speaking, negative cough or SOB  Denies pain  C/O vaginal irritation and vulvar itching  Negative GC/Chlamydia 9/16/2021  Last WNL PAP 7/2019    Plan  Take Fluconazole as directed  Wear loose clothes and cotton underwear  Call with needs or concerns  Annual GYN exam is due after 9/16/2022  Pt verbalized understanding of all discussed  I spent 10 minutes directly with the patient during this visit    VIRTUAL VISIT DISCLAIMER      Wilhemina Heimlich verbally agrees to participate in [employer]  Pt is aware that [employer] could be limited without vital signs or the ability to perform a full hands-on physical Cleranjana Whelan understands she or the provider may request at any time to terminate the video visit and request the patient to seek care or treatment in person

## 2022-01-04 NOTE — PATIENT INSTRUCTIONS
Take Fluconazole as directed  Wear loose clothes and cotton underwear  Call with needs or concerns  Annual GYN exam is due after 9/16/2022

## 2022-01-06 ENCOUNTER — APPOINTMENT (OUTPATIENT)
Dept: LAB | Facility: HOSPITAL | Age: 32
End: 2022-01-06
Payer: COMMERCIAL

## 2022-01-06 DIAGNOSIS — L50.1 IDIOPATHIC URTICARIA: ICD-10-CM

## 2022-01-06 DIAGNOSIS — E55.9 VITAMIN D DEFICIENCY, UNSPECIFIED: ICD-10-CM

## 2022-01-06 DIAGNOSIS — E06.9 THYROIDITIS, UNSPECIFIED: ICD-10-CM

## 2022-01-06 LAB
25(OH)D3 SERPL-MCNC: 15.4 NG/ML (ref 30–100)
ALBUMIN SERPL BCP-MCNC: 4.1 G/DL (ref 3.5–5)
ALP SERPL-CCNC: 107 U/L (ref 46–116)
ALT SERPL W P-5'-P-CCNC: 31 U/L (ref 12–78)
ANION GAP SERPL CALCULATED.3IONS-SCNC: 9 MMOL/L (ref 4–13)
AST SERPL W P-5'-P-CCNC: 16 U/L (ref 5–45)
BASOPHILS # BLD AUTO: 0.03 THOUSANDS/ΜL (ref 0–0.1)
BASOPHILS NFR BLD AUTO: 0 % (ref 0–1)
BILIRUB SERPL-MCNC: 1.07 MG/DL (ref 0.2–1)
BUN SERPL-MCNC: 12 MG/DL (ref 5–25)
CALCIUM SERPL-MCNC: 8.7 MG/DL (ref 8.3–10.1)
CHLORIDE SERPL-SCNC: 103 MMOL/L (ref 100–108)
CO2 SERPL-SCNC: 28 MMOL/L (ref 21–32)
CREAT SERPL-MCNC: 0.88 MG/DL (ref 0.6–1.3)
CRP SERPL QL: 3.9 MG/L
EOSINOPHIL # BLD AUTO: 0.04 THOUSAND/ΜL (ref 0–0.61)
EOSINOPHIL NFR BLD AUTO: 1 % (ref 0–6)
ERYTHROCYTE [DISTWIDTH] IN BLOOD BY AUTOMATED COUNT: 11.7 % (ref 11.6–15.1)
ERYTHROCYTE [SEDIMENTATION RATE] IN BLOOD: 12 MM/HOUR (ref 0–19)
GFR SERPL CREATININE-BSD FRML MDRD: 89 ML/MIN/1.73SQ M
GLUCOSE SERPL-MCNC: 90 MG/DL (ref 65–140)
HCT VFR BLD AUTO: 41.8 % (ref 34.8–46.1)
HGB BLD-MCNC: 13.1 G/DL (ref 11.5–15.4)
IMM GRANULOCYTES # BLD AUTO: 0.04 THOUSAND/UL (ref 0–0.2)
IMM GRANULOCYTES NFR BLD AUTO: 1 % (ref 0–2)
LYMPHOCYTES # BLD AUTO: 2.18 THOUSANDS/ΜL (ref 0.6–4.47)
LYMPHOCYTES NFR BLD AUTO: 25 % (ref 14–44)
MCH RBC QN AUTO: 28.7 PG (ref 26.8–34.3)
MCHC RBC AUTO-ENTMCNC: 31.3 G/DL (ref 31.4–37.4)
MCV RBC AUTO: 92 FL (ref 82–98)
MONOCYTES # BLD AUTO: 0.56 THOUSAND/ΜL (ref 0.17–1.22)
MONOCYTES NFR BLD AUTO: 6 % (ref 4–12)
NEUTROPHILS # BLD AUTO: 6.03 THOUSANDS/ΜL (ref 1.85–7.62)
NEUTS SEG NFR BLD AUTO: 67 % (ref 43–75)
NRBC BLD AUTO-RTO: 0 /100 WBCS
PLATELET # BLD AUTO: 394 THOUSANDS/UL (ref 149–390)
PMV BLD AUTO: 10.1 FL (ref 8.9–12.7)
POTASSIUM SERPL-SCNC: 3.7 MMOL/L (ref 3.5–5.3)
PROT SERPL-MCNC: 7.8 G/DL (ref 6.4–8.2)
RBC # BLD AUTO: 4.57 MILLION/UL (ref 3.81–5.12)
SODIUM SERPL-SCNC: 140 MMOL/L (ref 136–145)
TSH SERPL DL<=0.05 MIU/L-ACNC: 0.83 UIU/ML (ref 0.36–3.74)
WBC # BLD AUTO: 8.88 THOUSAND/UL (ref 4.31–10.16)

## 2022-01-06 PROCEDURE — 84443 ASSAY THYROID STIM HORMONE: CPT

## 2022-01-06 PROCEDURE — 36415 COLL VENOUS BLD VENIPUNCTURE: CPT

## 2022-01-06 PROCEDURE — 80053 COMPREHEN METABOLIC PANEL: CPT

## 2022-01-06 PROCEDURE — 86140 C-REACTIVE PROTEIN: CPT

## 2022-01-06 PROCEDURE — 82306 VITAMIN D 25 HYDROXY: CPT

## 2022-01-06 PROCEDURE — 85652 RBC SED RATE AUTOMATED: CPT

## 2022-01-06 PROCEDURE — 84165 PROTEIN E-PHORESIS SERUM: CPT

## 2022-01-06 PROCEDURE — 84165 PROTEIN E-PHORESIS SERUM: CPT | Performed by: PATHOLOGY

## 2022-01-06 PROCEDURE — 83520 IMMUNOASSAY QUANT NOS NONAB: CPT

## 2022-01-06 PROCEDURE — 85025 COMPLETE CBC W/AUTO DIFF WBC: CPT

## 2022-01-07 LAB
ALBUMIN SERPL ELPH-MCNC: 4.57 G/DL (ref 3.5–5)
ALBUMIN SERPL ELPH-MCNC: 58.6 % (ref 52–65)
ALPHA1 GLOB SERPL ELPH-MCNC: 0.34 G/DL (ref 0.1–0.4)
ALPHA1 GLOB SERPL ELPH-MCNC: 4.3 % (ref 2.5–5)
ALPHA2 GLOB SERPL ELPH-MCNC: 0.76 G/DL (ref 0.4–1.2)
ALPHA2 GLOB SERPL ELPH-MCNC: 9.7 % (ref 7–13)
BETA GLOB ABNORMAL SERPL ELPH-MCNC: 0.48 G/DL (ref 0.4–0.8)
BETA1 GLOB SERPL ELPH-MCNC: 6.2 % (ref 5–13)
BETA2 GLOB SERPL ELPH-MCNC: 4.6 % (ref 2–8)
BETA2+GAMMA GLOB SERPL ELPH-MCNC: 0.36 G/DL (ref 0.2–0.5)
GAMMA GLOB ABNORMAL SERPL ELPH-MCNC: 1.29 G/DL (ref 0.5–1.6)
GAMMA GLOB SERPL ELPH-MCNC: 16.6 % (ref 12–22)
IGG/ALB SER: 1.42 {RATIO} (ref 1.1–1.8)
PROT PATTERN SERPL ELPH-IMP: NORMAL
PROT SERPL-MCNC: 7.8 G/DL (ref 6.4–8.2)

## 2022-01-09 LAB — TRYPTASE SERPL-MCNC: 4.7 UG/L (ref 2.2–13.2)

## 2022-04-29 ENCOUNTER — OFFICE VISIT (OUTPATIENT)
Dept: OBGYN CLINIC | Facility: CLINIC | Age: 32
End: 2022-04-29

## 2022-04-29 VITALS
HEART RATE: 80 BPM | DIASTOLIC BLOOD PRESSURE: 71 MMHG | BODY MASS INDEX: 28.96 KG/M2 | SYSTOLIC BLOOD PRESSURE: 131 MMHG | WEIGHT: 174 LBS

## 2022-04-29 DIAGNOSIS — B37.9 YEAST INFECTION: Primary | ICD-10-CM

## 2022-04-29 LAB
BV WHIFF TEST VAG QL: NEGATIVE
CLUE CELLS SPEC QL WET PREP: NEGATIVE
PH SMN: 4.5 [PH]
SL AMB POCT WET MOUNT: ABNORMAL
T VAGINALIS VAG QL WET PREP: NEGATIVE
YEAST VAG QL WET PREP: POSITIVE

## 2022-04-29 PROCEDURE — 87210 SMEAR WET MOUNT SALINE/INK: CPT | Performed by: NURSE PRACTITIONER

## 2022-04-29 PROCEDURE — 99213 OFFICE O/P EST LOW 20 MIN: CPT | Performed by: NURSE PRACTITIONER

## 2022-04-29 RX ORDER — FLUCONAZOLE 150 MG/1
150 TABLET ORAL ONCE
Qty: 1 TABLET | Refills: 2 | Status: SHIPPED | OUTPATIENT
Start: 2022-04-29 | End: 2022-04-29

## 2022-04-29 RX ORDER — CETIRIZINE HYDROCHLORIDE 10 MG/1
TABLET ORAL
COMMUNITY
Start: 2022-04-11

## 2022-04-29 NOTE — PROGRESS NOTES
Assessment/Plan:     Diagnoses and all orders for this visit:    Yeast infection  -     POCT wet mount  -     fluconazole (DIFLUCAN) 150 mg tablet; Take 1 tablet (150 mg total) by mouth once for 1 dose    Other orders  -     cetirizine (ZyrTEC) 10 mg tablet      Plan  Take Fluconazole as directed  Wear loose clothes and cotton   Annual GYN exam due after 9/16/2022Call with needs or concerns  Return in 1 year  Pt verbalized understanding of all discussed  Subjective:      Patient ID: Madhuri De Los Santos is a 29 y o  female  HPI   Pt presents with C/O vaginal and vulvar itching and vaginal discharge for several days  Last WNL PAP 7/31/2019    Explained wet mount was positive for yeast, safe and effective use of Fluconazole was provided, discussed comfort measures      The following portions of the patient's history were reviewed and updated as appropriate: allergies, current medications, past family history, past medical history, past social history, past surgical history and problem list     Review of Systems      Pertinent items are note in the HPI  Objective:      /71   Pulse 80   Wt 78 9 kg (174 lb)   LMP 04/19/2022   BMI 28 96 kg/m²          Physical Exam    Alert and oriented  WNL respiratory effort, negative cough or SOB  Denies pain  Vulva negative lesions, slight erythema  Vagina erythema, positive thick white discharge   Cervix positive thick white discharge, negative lesions  Wet mount positive for yeast

## 2022-04-29 NOTE — PATIENT INSTRUCTIONS
Take Fluconazole as directed  Wear loose clothes and cotton   Annual GYN exam due after 9/16/2022Call with needs or concerns  Return in 1 year    COVID-19 Instructions    If you are having any of the following:  Cough   Shortness of breath   Fever  If traveled within past 2 weeks internationally or to high risk [place] states  Or been in contact with someone that has     Please call either:   Your PCP office  [phone], option [phone]    They will screen you over the phone and direct you to the nearest appropriate testing location  DO NOT go to your PCP or OB office without calling first     Thank you for your confidence in our team    We appreciate you and welcome your feedback  If you receive a survey from us, please take a few moments to let us know how we are doing     Sincerely,  DOMONIQUE Steel

## 2022-05-12 ENCOUNTER — APPOINTMENT (OUTPATIENT)
Dept: LAB | Facility: HOSPITAL | Age: 32
End: 2022-05-12
Payer: COMMERCIAL

## 2022-05-12 ENCOUNTER — TELEPHONE (OUTPATIENT)
Dept: FAMILY MEDICINE CLINIC | Facility: CLINIC | Age: 32
End: 2022-05-12

## 2022-05-12 ENCOUNTER — OFFICE VISIT (OUTPATIENT)
Dept: FAMILY MEDICINE CLINIC | Facility: CLINIC | Age: 32
End: 2022-05-12
Payer: COMMERCIAL

## 2022-05-12 VITALS
OXYGEN SATURATION: 98 % | BODY MASS INDEX: 28.99 KG/M2 | HEIGHT: 65 IN | DIASTOLIC BLOOD PRESSURE: 80 MMHG | SYSTOLIC BLOOD PRESSURE: 110 MMHG | TEMPERATURE: 98.5 F | WEIGHT: 174 LBS | HEART RATE: 92 BPM

## 2022-05-12 DIAGNOSIS — Z11.59 NEED FOR HEPATITIS C SCREENING TEST: ICD-10-CM

## 2022-05-12 DIAGNOSIS — Z00.00 ANNUAL PHYSICAL EXAM: ICD-10-CM

## 2022-05-12 DIAGNOSIS — Z00.00 ANNUAL PHYSICAL EXAM: Primary | ICD-10-CM

## 2022-05-12 DIAGNOSIS — Z11.4 SCREENING FOR HIV (HUMAN IMMUNODEFICIENCY VIRUS): ICD-10-CM

## 2022-05-12 LAB
CHOLEST SERPL-MCNC: 108 MG/DL
HDLC SERPL-MCNC: 58 MG/DL
LDLC SERPL CALC-MCNC: 26 MG/DL (ref 0–100)
NONHDLC SERPL-MCNC: 50 MG/DL
TRIGL SERPL-MCNC: 119 MG/DL

## 2022-05-12 PROCEDURE — 86480 TB TEST CELL IMMUN MEASURE: CPT

## 2022-05-12 PROCEDURE — 99395 PREV VISIT EST AGE 18-39: CPT | Performed by: FAMILY MEDICINE

## 2022-05-12 PROCEDURE — 86803 HEPATITIS C AB TEST: CPT

## 2022-05-12 PROCEDURE — 87389 HIV-1 AG W/HIV-1&-2 AB AG IA: CPT

## 2022-05-12 PROCEDURE — 80061 LIPID PANEL: CPT | Performed by: FAMILY MEDICINE

## 2022-05-12 PROCEDURE — 87591 N.GONORRHOEAE DNA AMP PROB: CPT

## 2022-05-12 PROCEDURE — 87491 CHLMYD TRACH DNA AMP PROBE: CPT

## 2022-05-12 PROCEDURE — 36415 COLL VENOUS BLD VENIPUNCTURE: CPT | Performed by: FAMILY MEDICINE

## 2022-05-12 NOTE — PROGRESS NOTES
[facility]    NAME: Matt Munoz  AGE: 29 y o  SEX: female  : 1990     DATE: 2022     Assessment and Plan:     Problem List Items Addressed This Visit    None     Visit Diagnoses     Annual physical exam    -  Primary    Relevant Orders    Hepatitis C Antibody ([facility])    HIV 1/2 Antigen/Antibody (4th Generation) w Reflex [facility]    Quantiferon TB Gold Plus    Chlamydia/GC amplified DNA by PCR    Lipid panel    BMI 28 0-28 9,adult        Screening for HIV (human immunodeficiency virus)        Relevant Orders    HIV 1/2 Antigen/Antibody (4th Generation) w Reflex [facility]    Need for hepatitis C screening test        Relevant Orders    Hepatitis C Antibody ([facility])          Immunizations and preventive care screenings were discussed with patient today  Appropriate education was printed on patient's after visit summary  Counseling:  Alcohol/drug use: discussed moderation in alcohol intake, the recommendations for healthy alcohol use, and avoidance of illicit drug use  Dental Health: discussed importance of regular tooth brushing, flossing, and dental visits  Injury prevention: discussed safety/seat belts, safety helmets, smoke detectors, carbon dioxide detectors, and smoking near bedding or upholstery  Sexual health: discussed sexually transmitted diseases, partner selection, use of condoms, avoidance of unintended pregnancy, and contraceptive alternatives  Exercise: the importance of regular exercise/physical activity was discussed  Recommend exercise 3-5 times per week for at least 30 minutes  BMI Counseling: Body mass index is 28 96 kg/m²   The BMI is above normal  Nutrition recommendations include decreasing portion sizes, encouraging healthy choices of fruits and vegetables, decreasing fast food intake, consuming healthier snacks, limiting drinks that contain sugar, moderation in carbohydrate intake, increasing intake of lean protein, reducing intake of saturated and trans fat and reducing intake of cholesterol  Exercise recommendations include moderate physical activity 150 minutes/week  No pharmacotherapy was ordered  Rationale for BMI follow-up plan is due to patient being overweight or obese  Depression Screening and Follow-up Plan: Patient was screened for depression during today's encounter  They screened negative with a PHQ-2 score of 0  Return in 1 year (on 5/12/2023)  Chief Complaint:     Chief Complaint   Patient presents with    Physical Exam    TB Test     Needs tb test, prefers lab work       History of Present Illness:     Adult Annual Physical   Patient here for a comprehensive physical exam  The patient reports no problems  Diet and Physical Activity  Diet/Nutrition: well balanced diet  Exercise: no formal exercise  Depression Screening  PHQ-2/9 Depression Screening    Little interest or pleasure in doing things: 0 - not at all  Feeling down, depressed, or hopeless: 0 - not at all  PHQ-2 Score: 0  PHQ-2 Interpretation: Negative depression screen       General Health  Sleep: sleeps poorly  Hearing: normal - bilateral   Vision: no vision problems  Dental: regular dental visits  /GYN Health  Last menstrual period: 04/19/2022  Contraceptive method: barrier methods, c   History of STDs?: yes  Review of Systems:     Review of Systems   Constitutional: Negative  HENT: Negative  Eyes: Negative  Respiratory: Negative  Cardiovascular: Negative  Gastrointestinal: Negative  Endocrine: Negative  Genitourinary: Negative  Musculoskeletal: Negative  Skin: Negative  Allergic/Immunologic: Negative  Neurological: Negative  Hematological: Negative  Psychiatric/Behavioral: Negative  All other systems reviewed and are negative       Past Medical History:     Past Medical History:   Diagnosis Date    Allergic     Anxiety     Chlamydia     Depression       Past Surgical History:     Past Surgical History:   Procedure Laterality Date    INDUCED       Surgically induced  Description: 2012       Social History:     Social History     Socioeconomic History    Marital status: Single     Spouse name: None    Number of children: None    Years of education: None    Highest education level: None   Occupational History     Employer: [address] Occupation: employed   Tobacco Use    Smoking status: Never Smoker    Smokeless tobacco: Former User    Tobacco comment: hookah   Vaping Use    Vaping Use: Never used   Substance and Sexual Activity    Alcohol use: Yes     Comment: social    Drug use: Never    Sexual activity: Yes     Partners: Female   Other Topics Concern    None   Social History Narrative    None     Social Determinants of Health     Financial Resource Strain: Not on file   Food Insecurity: Not on file   Transportation Needs: Not on file   Physical Activity: Not on file   Stress: Not on file   Social Connections: Not on file   Intimate Partner Violence: Not on file   Housing Stability: [address] Unable to Pay for Housing in the Last Year: No    Number of [place] in the Last Year: 1    Unstable Housing in the Last Year: No      Family History:     Family History   Problem Relation Age of Onset    Hypertension Mother     Thyroid disease Mother     Asthma Mother       Current Medications:     Current Outpatient Medications   Medication Sig Dispense Refill    cetirizine (ZyrTEC) 10 mg tablet       Multiple Vitamins-Minerals (multivitamin with minerals) tablet Take 1 tablet by mouth daily      ibuprofen (MOTRIN) 600 mg tablet Take 1 tablet (600 mg total) by mouth every 6 (six) hours as needed for mild pain (Patient not taking: Reported on 2022) 60 tablet 0     No current facility-administered medications for this visit        Allergies:     No Known Allergies   Physical Exam: /80   Pulse 92   Temp 98 5 °F (36 9 °C)   Ht 5' 5" (1 651 m)   Wt 78 9 kg (174 lb)   LMP 04/19/2022   SpO2 98%   BMI 28 96 kg/m²     Physical Exam  Vitals and nursing note reviewed  Constitutional:       General: She is not in acute distress  Appearance: She is well-developed  HENT:      Head: Normocephalic and atraumatic  Eyes:      Conjunctiva/sclera: Conjunctivae normal       Pupils: Pupils are equal, round, and reactive to light  Cardiovascular:      Rate and Rhythm: Normal rate and regular rhythm  Heart sounds: Normal heart sounds  No murmur heard  Pulmonary:      Effort: Pulmonary effort is normal  No respiratory distress  Breath sounds: Normal breath sounds  Abdominal:      General: Bowel sounds are normal       Palpations: Abdomen is soft  Tenderness: There is no abdominal tenderness  Musculoskeletal:      Cervical back: Normal range of motion and neck supple  Skin:     General: Skin is warm and dry  Capillary Refill: Capillary refill takes less than 2 seconds  Neurological:      General: No focal deficit present  Mental Status: She is alert and oriented to person, place, and time     Psychiatric:         Mood and Affect: Mood normal          Behavior: Behavior normal           Daiana Cowan DO   [address] [facility] [place], [place]

## 2022-05-12 NOTE — PATIENT INSTRUCTIONS
Wellness Visit for Adults   AMBULATORY CARE:   A wellness visit  is when you see your healthcare provider to get screened for health problems  Your healthcare provider will also give you advice on how to stay healthy  Write down your questions so you remember to ask them  Ask your healthcare provider how often you should have a wellness visit  What happens at a wellness visit:  Your healthcare provider will ask about your health, and your family history of health problems  This includes high blood pressure, heart disease, and cancer  He or she will ask if you have symptoms that concern you, if you smoke, and about your mood  You may also be asked about your intake of medicines, supplements, food, and alcohol  Any of the following may be done:  · Your weight  will be checked  Your height may also be checked so your body mass index (BMI) can be calculated  Your BMI shows if you are at a healthy weight  · Your blood pressure  and heart rate will be checked  Your temperature may also be checked  · Blood and urine tests  may be done  Blood tests may be done to check your cholesterol levels  Abnormal cholesterol levels increase your risk for heart disease and stroke  You may also need a blood or urine test to check for diabetes if you are at increased risk  Urine tests may be done to look for signs of an infection or kidney disease  · A physical exam  includes checking your heartbeat and lungs with a stethoscope  Your healthcare provider may also check your skin to look for sun damage  · Screening tests  may be recommended  A screening test is done to check for diseases that may not cause symptoms  The screening tests you may need depend on your age, gender, family history, and lifestyle habits  For example, colorectal screening may be recommended if you are 48years old or older  Screening tests you need if you are a woman:   · A Pap smear  is used to screen for cervical cancer   Pap smears are usually done every 3 to 5 years depending on your age  You may need them more often if you have had abnormal Pap smear test results in the past  Ask your healthcare provider how often you should have a Pap smear  · A mammogram  is an x-ray of your breasts to screen for breast cancer  Experts recommend mammograms every 2 years starting at age 48 years  You may need a mammogram at age 52 years or younger if you have an increased risk for breast cancer  Talk to your healthcare provider about when you should start having mammograms and how often you need them  Vaccines you may need:   · Get an influenza vaccine  every year  The influenza vaccine protects you from the flu  Several types of viruses cause the flu  The viruses change over time, so new vaccines are made each year  · Get a tetanus-diphtheria (Td) booster vaccine  every 10 years  This vaccine protects you against tetanus and diphtheria  Tetanus is a severe infection that may cause painful muscle spasms and lockjaw  Diphtheria is a severe bacterial infection that causes a thick covering in the back of your mouth and throat  · Get a human papillomavirus (HPV) vaccine  if you are female and aged 23 to 32 or male 23 to 24 and never received it  This vaccine protects you from HPV infection  HPV is the most common infection spread by sexual contact  HPV may also cause vaginal, penile, and anal cancers  · Get a pneumococcal vaccine  if you are aged 72 years or older  The pneumococcal vaccine is an injection given to protect you from pneumococcal disease  Pneumococcal disease is an infection caused by pneumococcal bacteria  The infection may cause pneumonia, meningitis, or an ear infection  · Get a shingles vaccine  if you are 60 or older, even if you have had shingles before  The shingles vaccine is an injection to protect you from the varicella-zoster virus  This is the same virus that causes chickenpox   Shingles is a painful rash that develops in people who had chickenpox or have been exposed to the virus  How to eat healthy:  My Plate is a model for planning healthy meals  It shows the types and amounts of foods that should go on your plate  Fruits and vegetables make up about half of your plate, and grains and protein make up the other half  A serving of dairy is included on the side of your plate  The amount of calories and serving sizes you need depends on your age, gender, weight, and height  Examples of healthy foods are listed below:  · Eat a variety of vegetables  such as dark green, red, and orange vegetables  You can also include canned vegetables low in sodium (salt) and frozen vegetables without added butter or sauces  · Eat a variety of fresh fruits , canned fruit in 100% juice, frozen fruit, and dried fruit  · Include whole grains  At least half of the grains you eat should be whole grains  Examples include whole-wheat bread, wheat pasta, brown rice, and whole-grain cereals such as oatmeal     · Eat a variety of protein foods such as seafood (fish and shellfish), lean meat, and poultry without skin (turkey and chicken)  Examples of lean meats include pork leg, shoulder, or tenderloin, and beef round, sirloin, tenderloin, and extra lean ground beef  Other protein foods include eggs and egg substitutes, beans, peas, soy products, nuts, and seeds  · Choose low-fat dairy products such as skim or 1% milk or low-fat yogurt, cheese, and cottage cheese  · Limit unhealthy fats  such as butter, hard margarine, and shortening  Exercise:  Exercise at least 30 minutes per day on most days of the week  Some examples of exercise include walking, biking, dancing, and swimming  You can also fit in more physical activity by taking the stairs instead of the elevator or parking farther away from stores  Include muscle strengthening activities 2 days each week  Regular exercise provides many health benefits   It helps you manage your weight, and decreases your risk for type 2 diabetes, heart disease, stroke, and high blood pressure  Exercise can also help improve your mood  Ask your healthcare provider about the best exercise plan for you  General health and safety guidelines:   · Do not smoke  Nicotine and other chemicals in cigarettes and cigars can cause lung damage  Ask your healthcare provider for information if you currently smoke and need help to quit  E-cigarettes or smokeless tobacco still contain nicotine  Talk to your healthcare provider before you use these products  · Limit alcohol  A drink of alcohol is 12 ounces of beer, 5 ounces of wine, or 1½ ounces of liquor  · Lose weight, if needed  Being overweight increases your risk of certain health conditions  These include heart disease, high blood pressure, type 2 diabetes, and certain types of cancer  · Protect your skin  Do not sunbathe or use tanning beds  Use sunscreen with a SPF 15 or higher  Apply sunscreen at least 15 minutes before you go outside  Reapply sunscreen every 2 hours  Wear protective clothing, hats, and sunglasses when you are outside  · Drive safely  Always wear your seatbelt  Make sure everyone in your car wears a seatbelt  A seatbelt can save your life if you are in an accident  Do not use your cell phone when you are driving  This could distract you and cause an accident  Pull over if you need to make a call or send a text message  · Practice safe sex  Use latex condoms if are sexually active and have more than one partner  Your healthcare provider may recommend screening tests for sexually transmitted infections (STIs)  · Wear helmets, lifejackets, and protective gear  Always wear a helmet when you ride a bike or motorcycle, go skiing, or play sports that could cause a head injury  Wear protective equipment when you play sports  Wear a lifejacket when you are on a boat or doing water sports      © Copyright [employer] 2022 Information is for End User's use only and may not be sold, redistributed or otherwise used for commercial purposes  All illustrations and images included in CareNotes® are the copyrighted property of [employer] M [employer]  or Kayla Eubanks  The above information is an  only  It is not intended as medical advice for individual conditions or treatments  Talk to your doctor, nurse or pharmacist before following any medical regimen to see if it is safe and effective for you  Weight Management   AMBULATORY CARE:   Why it is important to manage your weight:  Being overweight increases your risk of health conditions such as heart disease, high blood pressure, type 2 diabetes, and certain types of cancer  It can also increase your risk for osteoarthritis, sleep apnea, and other respiratory problems  Aim for a slow, steady weight loss  Even a small amount of weight loss can lower your risk of health problems  Risks of being overweight:  Extra weight can cause many health problems, including the following:  · Diabetes (high blood sugar level)    · High blood pressure or high cholesterol    · Heart disease    · Stroke    · Gallbladder or liver disease    · Cancer of the colon, breast, prostate, liver, or kidney    · Sleep apnea    · Arthritis or gout    Screening  is done to check for health conditions before you have signs or symptoms  If you are 28to 79years old, your blood sugar level may be checked every 3 years for signs of prediabetes or diabetes  Your healthcare provider will check your blood pressure at each visit  High blood pressure can lead to a stroke or other problems  Your provider may check for signs of heart disease, cancer, or other health problems  How to lose weight safely:  A safe and healthy way to lose weight is to eat fewer calories and get regular exercise  · You can lose up about 1 pound a week by decreasing the number of calories you eat by 500 calories each day   You can decrease calories by eating smaller portion sizes or by cutting out high-calorie foods  Read labels to find out how many calories are in the foods you eat  · You can also burn calories with exercise such as walking, swimming, or biking  You will be more likely to keep weight off if you make these changes part of your lifestyle  Exercise at least 30 minutes per day on most days of the week  You can also fit in more physical activity by taking the stairs instead of the elevator or parking farther away from stores  Ask your healthcare provider about the best exercise plan for you  Healthy meal plan for weight management:  A healthy meal plan includes a variety of foods, contains fewer calories, and helps you stay healthy  A healthy meal plan includes the following:     · Eat whole-grain foods more often  A healthy meal plan should contain fiber  Fiber is the part of grains, fruits, and vegetables that is not broken down by your body  Whole-grain foods are healthy and provide extra fiber in your diet  Some examples of whole-grain foods are whole-wheat breads and pastas, oatmeal, brown rice, and bulgur  · Eat a variety of vegetables every day  Include dark, leafy greens such as spinach, kale, jimmy greens, and mustard greens  Eat yellow and orange vegetables such as carrots, sweet potatoes, and winter squash  · Eat a variety of fruits every day  Choose fresh or canned fruit (canned in its own juice or light syrup) instead of juice  Fruit juice has very little or no fiber  · Eat low-fat dairy foods  Drink fat-free (skim) milk or 1% milk  Eat fat-free yogurt and low-fat cottage cheese  Try low-fat cheeses such as mozzarella and other reduced-fat cheeses  · Choose meat and other protein foods that are low in fat  Choose beans or other legumes such as split peas or lentils  Choose fish, skinless poultry (chicken or turkey), or lean cuts of red meat (beef or pork)   Before you cook meat or poultry, cut off any visible fat  · Use less fat and oil  Try baking foods instead of frying them  Add less fat, such as margarine, sour cream, regular salad dressing and mayonnaise to foods  Eat fewer high-fat foods  Some examples of high-fat foods include french fries, doughnuts, ice cream, and cakes  · Eat fewer sweets  Limit foods and drinks that are high in sugar  This includes candy, cookies, regular soda, and sweetened drinks  Ways to decrease calories:   · Eat smaller portions  ? Use a small plate with smaller servings  ? Do not eat second helpings  ? When you eat at a restaurant, ask for a box and place half of your meal in the box before you eat  ? Share an entrée with someone else  · Replace high-calorie snacks with healthy, low-calorie snacks  ? Choose fresh fruit, vegetables, fat-free rice cakes, or air-popped popcorn instead of potato chips, nuts, or chocolate  ? Choose water or calorie-free drinks instead of soda or sweetened drinks  · Do not shop for groceries when you are hungry  You may be more likely to make unhealthy food choices  Take a grocery list of healthy foods and shop after you have eaten  · Eat regular meals  Do not skip meals  Skipping meals can lead to overeating later in the day  This can make it harder for you to lose weight  Eat a healthy snack in place of a meal if you do not have time to eat a regular meal  Talk with a dietitian to help you create a meal plan and schedule that is right for you  Other things to consider as you try to lose weight:   · Be aware of situations that may give you the urge to overeat, such as eating while watching television  Find ways to avoid these situations  For example, read a book, go for a walk, or do crafts  · Meet with a weight loss support group or friends who are also trying to lose weight  This may help you stay motivated to continue working on your weight loss goals      © Copyright [employer] 2022 Information is for End User's use only and may not be sold, redistributed or otherwise used for commercial purposes  All illustrations and images included in CareNotes® are the copyrighted property of [employer] M [employer] Inc  or Kayla Eubanks  The above information is an  only  It is not intended as medical advice for individual conditions or treatments  Talk to your doctor, nurse or pharmacist before following any medical regimen to see if it is safe and effective for you  Cholesterol and Your Health   AMBULATORY CARE:   Cholesterol  is a waxy, fat-like substance  Your body uses cholesterol to make hormones and new cells, and to protect nerves  Cholesterol is made by your body  It also comes from certain foods you eat, such as meat and dairy products  Your healthcare provider can help you set goals for your cholesterol levels  He or she can help you create a plan to meet your goals  Cholesterol level goals: Your cholesterol level goals depend on your risk for heart disease, your age, and your other health conditions  The following are general guidelines:  · Total cholesterol  includes low-density lipoprotein (LDL), high-density lipoprotein (HDL), and triglyceride levels  The total cholesterol level should be lower than 200 mg/dL and is best at about 150 mg/dL  · LDL cholesterol  is called bad cholesterol  because it forms plaque in your arteries  As plaque builds up, your arteries become narrow, and less blood flows through  When plaque decreases blood flow to your heart, you may have chest pain  If plaque completely blocks an artery that brings blood to your heart, you may have a heart attack  Plaque can break off and form blood clots  Blood clots may block arteries in your brain and cause a stroke  The level should be less than 130 mg/dL and is best at about 100 mg/dL  · HDL cholesterol  is called good cholesterol  because it helps remove LDL cholesterol from your arteries   It does this by attaching to LDL cholesterol and carrying it to your liver  Your liver breaks down LDL cholesterol so your body can get rid of it  High levels of HDL cholesterol can help prevent a heart attack and stroke  Low levels of HDL cholesterol can increase your risk for heart disease, heart attack, and stroke  The level should be 60 mg/dL or higher  · Triglycerides  are a type of fat that store energy from foods you eat  High levels of triglycerides also cause plaque buildup  This can increase your risk for a heart attack or stroke  If your triglyceride level is high, your LDL cholesterol level may also be high  The level should be less than 150 mg/dL  Any of the following can increase your risk for high cholesterol:   · Smoking cigarettes    · Being overweight or obese, or not getting enough exercise    · Drinking large amounts of alcohol    · A medical condition such as hypertension (high blood pressure) or diabetes    · Certain genes passed from your parents to you    · Age older than 65 years    What you need to know about having your cholesterol levels checked: Adults 21to 39years of age should have their cholesterol levels checked every 4 to 6 years  Adults 45 years or older should have their cholesterol checked every 1 to 2 years  You may need your cholesterol checked more often, or at a younger age, if you have risk factors for heart disease  You may also need to have your cholesterol checked more often if you have other health conditions, such as diabetes  Blood tests are used to check cholesterol levels  Blood tests measure your levels of triglycerides, LDL cholesterol, and HDL cholesterol  How healthy fats affect your cholesterol levels:  Healthy fats, also called unsaturated fats, help lower LDL cholesterol and triglyceride levels  Healthy fats include the following:  · Monounsaturated fats  are found in foods such as olive oil, canola oil, avocado, nuts, and olives      · Polyunsaturated fats,  such as omega 3 fats, are found in fish, such as salmon, trout, and tuna  They can also be found in plant foods such as flaxseed, walnuts, and soybeans  How unhealthy fats affect your cholesterol levels:  Unhealthy fats increase LDL cholesterol and triglyceride levels  They are found in foods high in cholesterol, saturated fat, and trans fat:  · Cholesterol  is found in eggs, dairy, and meat  · Saturated fat  is found in butter, cheese, ice cream, whole milk, and coconut oil  Saturated fat is also found in meat, such as sausage, hot dogs, and bologna  · Trans fat  is found in liquid oils and is used in fried and baked foods  Foods that contain trans fats include chips, crackers, muffins, sweet rolls, microwave popcorn, and cookies  Treatment  for high cholesterol will also decrease your risk of heart disease, heart attack, and stroke  Treatment may include any of the following:  · Lifestyle changes  may include food, exercise, weight loss, and quitting smoking  You may also need to decrease the amount of alcohol you drink  Your healthcare provider will want you to start with lifestyle changes  Other treatment may be added if lifestyle changes are not enough  Your healthcare provider may recommend you work with a team to manage hyperlipidemia  The team may include medical experts such as a dietitian, an exercise or physical therapist, and a behavior therapist  Your family members may be included in helping you create lifestyle changes  · Medicines  may be given to lower your LDL cholesterol, triglyceride levels, or total cholesterol level  You may need medicines to lower your cholesterol if any of the following is true:    ? You have a history of stroke, TIA, unstable angina, or a heart attack  ? Your LDL cholesterol level is 190 mg/dL or higher  ? You are age 36 to 76 years, have diabetes or heart disease risk factors, and your LDL cholesterol is 70 mg/dL or higher      · Supplements  include fish oil, red yeast rice, and garlic  Fish oil may help lower your triglyceride and LDL cholesterol levels  It may also increase your HDL cholesterol level  Red yeast rice may help decrease your total cholesterol level and LDL cholesterol level  Garlic may help lower your total cholesterol level  Do not take any supplements without talking to your healthcare provider  Food changes you can make to lower your cholesterol levels:  A dietitian can help you create a healthy eating plan  He or she can show you how to read food labels and choose foods low in saturated fat, trans fats, and cholesterol  · Decrease the total amount of fat you eat  Choose lean meats, fat-free or 1% fat milk, and low-fat dairy products, such as yogurt and cheese  Try to limit or avoid red meats  Limit or do not eat fried foods or baked goods, such as cookies  · Replace unhealthy fats with healthy fats  Cook foods in olive oil or canola oil  Choose soft margarines that are low in saturated fat and trans fat  Seeds, nuts, and avocados are other examples of healthy fats  · Eat foods with omega-3 fats  Examples include salmon, tuna, mackerel, walnuts, and flaxseed  Eat fish 2 times per week  Pregnant women should not eat fish that have high levels of mercury, such as shark, swordfish, and sherri mackerel  · Increase the amount of high-fiber foods you eat  High-fiber foods can help lower your LDL cholesterol  Aim to get between 20 and 30 grams of fiber each day  Fruits and vegetables are high in fiber  Eat at least 5 servings each day  Other high-fiber foods are whole-grain or whole-wheat breads, pastas, or cereals, and brown rice  Eat 3 ounces of whole-grain foods each day  Increase fiber slowly  You may have abdominal discomfort, bloating, and gas if you add fiber to your diet too quickly  · Eat healthy protein foods  Examples include low-fat dairy products, skinless chicken and turkey, fish, and nuts      · Limit foods and drinks that are high in sugar  Your dietitian or healthcare provider can help you create daily limits for high-sugar foods and drinks  The limit may be lower if you have diabetes or another health condition  Limits can also help you lose weight if needed  Lifestyle changes you can make to lower your cholesterol levels:   · Maintain a healthy weight  Ask your healthcare provider what a healthy weight is for you  Ask him or her to help you create a weight loss plan if needed  Weight loss can decrease your total cholesterol and triglyceride levels  Weight loss may also help keep your blood pressure at a healthy level  · Be physically active throughout the day  Physical activity, such as exercise, can help lower your total cholesterol level and maintain a healthy weight  Physical activity can also help increase your HDL cholesterol level  Work with your healthcare provider to create an program that is right for you  Get at least 30 to 40 minutes of moderate physical activity most days of the week  Examples of exercise include brisk walking, swimming, or biking  Also include strength training at least 2 times each week  Your healthcare providers can help you create a physical activity plan  · Do not smoke  Nicotine and other chemicals in cigarettes and cigars can raise your cholesterol levels  Ask your healthcare provider for information if you currently smoke and need help to quit  E-cigarettes or smokeless tobacco still contain nicotine  Talk to your healthcare provider before you use these products  · Limit or do not drink alcohol  Alcohol can increase your triglyceride levels  Ask your healthcare provider before you drink alcohol  Ask how much is okay for you to drink in 24 hours or 1 week  Follow up with your doctor as directed:  Write down your questions so you remember to ask them during your visits    © Copyright [employer] 2022 Information is for End User's use only and may not be sold, redistributed or otherwise used for commercial purposes  All illustrations and images included in CareNotes® are the copyrighted property of [employer]  or Kayla Eubanks  The above information is an  only  It is not intended as medical advice for individual conditions or treatments  Talk to your doctor, nurse or pharmacist before following any medical regimen to see if it is safe and effective for you  Wellness Visit for Adults   AMBULATORY CARE:   A wellness visit  is when you see your healthcare provider to get screened for health problems  Your healthcare provider will also give you advice on how to stay healthy  Write down your questions so you remember to ask them  Ask your healthcare provider how often you should have a wellness visit  What happens at a wellness visit:  Your healthcare provider will ask about your health, and your family history of health problems  This includes high blood pressure, heart disease, and cancer  He or she will ask if you have symptoms that concern you, if you smoke, and about your mood  You may also be asked about your intake of medicines, supplements, food, and alcohol  Any of the following may be done:  · Your weight  will be checked  Your height may also be checked so your body mass index (BMI) can be calculated  Your BMI shows if you are at a healthy weight  · Your blood pressure  and heart rate will be checked  Your temperature may also be checked  · Blood and urine tests  may be done  Blood tests may be done to check your cholesterol levels  Abnormal cholesterol levels increase your risk for heart disease and stroke  You may also need a blood or urine test to check for diabetes if you are at increased risk  Urine tests may be done to look for signs of an infection or kidney disease  · A physical exam  includes checking your heartbeat and lungs with a stethoscope   Your healthcare provider may also check your skin to look for sun damage  · Screening tests  may be recommended  A screening test is done to check for diseases that may not cause symptoms  The screening tests you may need depend on your age, gender, family history, and lifestyle habits  For example, colorectal screening may be recommended if you are 48years old or older  Screening tests you need if you are a woman:   · A Pap smear  is used to screen for cervical cancer  Pap smears are usually done every 3 to 5 years depending on your age  You may need them more often if you have had abnormal Pap smear test results in the past  Ask your healthcare provider how often you should have a Pap smear  · A mammogram  is an x-ray of your breasts to screen for breast cancer  Experts recommend mammograms every 2 years starting at age 48 years  You may need a mammogram at age 52 years or younger if you have an increased risk for breast cancer  Talk to your healthcare provider about when you should start having mammograms and how often you need them  Vaccines you may need:   · Get an influenza vaccine  every year  The influenza vaccine protects you from the flu  Several types of viruses cause the flu  The viruses change over time, so new vaccines are made each year  · Get a tetanus-diphtheria (Td) booster vaccine  every 10 years  This vaccine protects you against tetanus and diphtheria  Tetanus is a severe infection that may cause painful muscle spasms and lockjaw  Diphtheria is a severe bacterial infection that causes a thick covering in the back of your mouth and throat  · Get a human papillomavirus (HPV) vaccine  if you are female and aged 23 to 32 or male 23 to 24 and never received it  This vaccine protects you from HPV infection  HPV is the most common infection spread by sexual contact  HPV may also cause vaginal, penile, and anal cancers  · Get a pneumococcal vaccine  if you are aged 72 years or older   The pneumococcal vaccine is an injection given to protect you from pneumococcal disease  Pneumococcal disease is an infection caused by pneumococcal bacteria  The infection may cause pneumonia, meningitis, or an ear infection  · Get a shingles vaccine  if you are 60 or older, even if you have had shingles before  The shingles vaccine is an injection to protect you from the varicella-zoster virus  This is the same virus that causes chickenpox  Shingles is a painful rash that develops in people who had chickenpox or have been exposed to the virus  How to eat healthy:  My Plate is a model for planning healthy meals  It shows the types and amounts of foods that should go on your plate  Fruits and vegetables make up about half of your plate, and grains and protein make up the other half  A serving of dairy is included on the side of your plate  The amount of calories and serving sizes you need depends on your age, gender, weight, and height  Examples of healthy foods are listed below:  · Eat a variety of vegetables  such as dark green, red, and orange vegetables  You can also include canned vegetables low in sodium (salt) and frozen vegetables without added butter or sauces  · Eat a variety of fresh fruits , canned fruit in 100% juice, frozen fruit, and dried fruit  · Include whole grains  At least half of the grains you eat should be whole grains  Examples include whole-wheat bread, wheat pasta, brown rice, and whole-grain cereals such as oatmeal     · Eat a variety of protein foods such as seafood (fish and shellfish), lean meat, and poultry without skin (turkey and chicken)  Examples of lean meats include pork leg, shoulder, or tenderloin, and beef round, sirloin, tenderloin, and extra lean ground beef  Other protein foods include eggs and egg substitutes, beans, peas, soy products, nuts, and seeds  · Choose low-fat dairy products such as skim or 1% milk or low-fat yogurt, cheese, and cottage cheese      · Limit unhealthy fats  such as butter, hard margarine, and shortening  Exercise:  Exercise at least 30 minutes per day on most days of the week  Some examples of exercise include walking, biking, dancing, and swimming  You can also fit in more physical activity by taking the stairs instead of the elevator or parking farther away from stores  Include muscle strengthening activities 2 days each week  Regular exercise provides many health benefits  It helps you manage your weight, and decreases your risk for type 2 diabetes, heart disease, stroke, and high blood pressure  Exercise can also help improve your mood  Ask your healthcare provider about the best exercise plan for you  General health and safety guidelines:   · Do not smoke  Nicotine and other chemicals in cigarettes and cigars can cause lung damage  Ask your healthcare provider for information if you currently smoke and need help to quit  E-cigarettes or smokeless tobacco still contain nicotine  Talk to your healthcare provider before you use these products  · Limit alcohol  A drink of alcohol is 12 ounces of beer, 5 ounces of wine, or 1½ ounces of liquor  · Lose weight, if needed  Being overweight increases your risk of certain health conditions  These include heart disease, high blood pressure, type 2 diabetes, and certain types of cancer  · Protect your skin  Do not sunbathe or use tanning beds  Use sunscreen with a SPF 15 or higher  Apply sunscreen at least 15 minutes before you go outside  Reapply sunscreen every 2 hours  Wear protective clothing, hats, and sunglasses when you are outside  · Drive safely  Always wear your seatbelt  Make sure everyone in your car wears a seatbelt  A seatbelt can save your life if you are in an accident  Do not use your cell phone when you are driving  This could distract you and cause an accident  Pull over if you need to make a call or send a text message  · Practice safe sex    Use latex condoms if are sexually active and have more than one partner  Your healthcare provider may recommend screening tests for sexually transmitted infections (STIs)  · Wear helmets, lifejackets, and protective gear  Always wear a helmet when you ride a bike or motorcycle, go skiing, or play sports that could cause a head injury  Wear protective equipment when you play sports  Wear a lifejacket when you are on a boat or doing water sports  © Copyright [employer] 2022 Information is for End User's use only and may not be sold, redistributed or otherwise used for commercial purposes  All illustrations and images included in CareNotes® are the copyrighted property of [employer]  or [employer]   The above information is an  only  It is not intended as medical advice for individual conditions or treatments  Talk to your doctor, nurse or pharmacist before following any medical regimen to see if it is safe and effective for you  Weight Management   AMBULATORY CARE:   Why it is important to manage your weight:  Being overweight increases your risk of health conditions such as heart disease, high blood pressure, type 2 diabetes, and certain types of cancer  It can also increase your risk for osteoarthritis, sleep apnea, and other respiratory problems  Aim for a slow, steady weight loss  Even a small amount of weight loss can lower your risk of health problems  Risks of being overweight:  Extra weight can cause many health problems, including the following:  · Diabetes (high blood sugar level)    · High blood pressure or high cholesterol    · Heart disease    · Stroke    · Gallbladder or liver disease    · Cancer of the colon, breast, prostate, liver, or kidney    · Sleep apnea    · Arthritis or gout    Screening  is done to check for health conditions before you have signs or symptoms  If you are 28to 79years old, your blood sugar level may be checked every 3 years for signs of prediabetes or diabetes   Your healthcare provider will check your blood pressure at each visit  High blood pressure can lead to a stroke or other problems  Your provider may check for signs of heart disease, cancer, or other health problems  How to lose weight safely:  A safe and healthy way to lose weight is to eat fewer calories and get regular exercise  · You can lose up about 1 pound a week by decreasing the number of calories you eat by 500 calories each day  You can decrease calories by eating smaller portion sizes or by cutting out high-calorie foods  Read labels to find out how many calories are in the foods you eat  · You can also burn calories with exercise such as walking, swimming, or biking  You will be more likely to keep weight off if you make these changes part of your lifestyle  Exercise at least 30 minutes per day on most days of the week  You can also fit in more physical activity by taking the stairs instead of the elevator or parking farther away from stores  Ask your healthcare provider about the best exercise plan for you  Healthy meal plan for weight management:  A healthy meal plan includes a variety of foods, contains fewer calories, and helps you stay healthy  A healthy meal plan includes the following:     · Eat whole-grain foods more often  A healthy meal plan should contain fiber  Fiber is the part of grains, fruits, and vegetables that is not broken down by your body  Whole-grain foods are healthy and provide extra fiber in your diet  Some examples of whole-grain foods are whole-wheat breads and pastas, oatmeal, brown rice, and bulgur  · Eat a variety of vegetables every day  Include dark, leafy greens such as spinach, kale, jimmy greens, and mustard greens  Eat yellow and orange vegetables such as carrots, sweet potatoes, and winter squash  · Eat a variety of fruits every day  Choose fresh or canned fruit (canned in its own juice or light syrup) instead of juice  Fruit juice has very little or no fiber      · Eat low-fat dairy foods  Drink fat-free (skim) milk or 1% milk  Eat fat-free yogurt and low-fat cottage cheese  Try low-fat cheeses such as mozzarella and other reduced-fat cheeses  · Choose meat and other protein foods that are low in fat  Choose beans or other legumes such as split peas or lentils  Choose fish, skinless poultry (chicken or turkey), or lean cuts of red meat (beef or pork)  Before you cook meat or poultry, cut off any visible fat  · Use less fat and oil  Try baking foods instead of frying them  Add less fat, such as margarine, sour cream, regular salad dressing and mayonnaise to foods  Eat fewer high-fat foods  Some examples of high-fat foods include french fries, doughnuts, ice cream, and cakes  · Eat fewer sweets  Limit foods and drinks that are high in sugar  This includes candy, cookies, regular soda, and sweetened drinks  Ways to decrease calories:   · Eat smaller portions  ? Use a small plate with smaller servings  ? Do not eat second helpings  ? When you eat at a restaurant, ask for a box and place half of your meal in the box before you eat  ? Share an entrée with someone else  · Replace high-calorie snacks with healthy, low-calorie snacks  ? Choose fresh fruit, vegetables, fat-free rice cakes, or air-popped popcorn instead of potato chips, nuts, or chocolate  ? Choose water or calorie-free drinks instead of soda or sweetened drinks  · Do not shop for groceries when you are hungry  You may be more likely to make unhealthy food choices  Take a grocery list of healthy foods and shop after you have eaten  · Eat regular meals  Do not skip meals  Skipping meals can lead to overeating later in the day  This can make it harder for you to lose weight  Eat a healthy snack in place of a meal if you do not have time to eat a regular meal  Talk with a dietitian to help you create a meal plan and schedule that is right for you      Other things to consider as you try to lose weight:   · Be aware of situations that may give you the urge to overeat, such as eating while watching television  Find ways to avoid these situations  For example, read a book, go for a walk, or do crafts  · Meet with a weight loss support group or friends who are also trying to lose weight  This may help you stay motivated to continue working on your weight loss goals  © Copyright [employer] 2022 Information is for End User's use only and may not be sold, redistributed or otherwise used for commercial purposes  All illustrations and images included in CareNotes® are the copyrighted property of [employer] Inc  or Kayla Gonzalez   The above information is an  only  It is not intended as medical advice for individual conditions or treatments  Talk to your doctor, nurse or pharmacist before following any medical regimen to see if it is safe and effective for you  Cholesterol and Your Health   AMBULATORY CARE:   Cholesterol  is a waxy, fat-like substance  Your body uses cholesterol to make hormones and new cells, and to protect nerves  Cholesterol is made by your body  It also comes from certain foods you eat, such as meat and dairy products  Your healthcare provider can help you set goals for your cholesterol levels  He or she can help you create a plan to meet your goals  Cholesterol level goals: Your cholesterol level goals depend on your risk for heart disease, your age, and your other health conditions  The following are general guidelines:  · Total cholesterol  includes low-density lipoprotein (LDL), high-density lipoprotein (HDL), and triglyceride levels  The total cholesterol level should be lower than 200 mg/dL and is best at about 150 mg/dL  · LDL cholesterol  is called bad cholesterol  because it forms plaque in your arteries  As plaque builds up, your arteries become narrow, and less blood flows through   When plaque decreases blood flow to your heart, you may have chest pain  If plaque completely blocks an artery that brings blood to your heart, you may have a heart attack  Plaque can break off and form blood clots  Blood clots may block arteries in your brain and cause a stroke  The level should be less than 130 mg/dL and is best at about 100 mg/dL  · HDL cholesterol  is called good cholesterol  because it helps remove LDL cholesterol from your arteries  It does this by attaching to LDL cholesterol and carrying it to your liver  Your liver breaks down LDL cholesterol so your body can get rid of it  High levels of HDL cholesterol can help prevent a heart attack and stroke  Low levels of HDL cholesterol can increase your risk for heart disease, heart attack, and stroke  The level should be 60 mg/dL or higher  · Triglycerides  are a type of fat that store energy from foods you eat  High levels of triglycerides also cause plaque buildup  This can increase your risk for a heart attack or stroke  If your triglyceride level is high, your LDL cholesterol level may also be high  The level should be less than 150 mg/dL  Any of the following can increase your risk for high cholesterol:   · Smoking cigarettes    · Being overweight or obese, or not getting enough exercise    · Drinking large amounts of alcohol    · A medical condition such as hypertension (high blood pressure) or diabetes    · Certain genes passed from your parents to you    · Age older than 65 years    What you need to know about having your cholesterol levels checked: Adults 21to 39years of age should have their cholesterol levels checked every 4 to 6 years  Adults 45 years or older should have their cholesterol checked every 1 to 2 years  You may need your cholesterol checked more often, or at a younger age, if you have risk factors for heart disease  You may also need to have your cholesterol checked more often if you have other health conditions, such as diabetes   Blood tests are used to check cholesterol levels  Blood tests measure your levels of triglycerides, LDL cholesterol, and HDL cholesterol  How healthy fats affect your cholesterol levels:  Healthy fats, also called unsaturated fats, help lower LDL cholesterol and triglyceride levels  Healthy fats include the following:  · Monounsaturated fats  are found in foods such as olive oil, canola oil, avocado, nuts, and olives  · Polyunsaturated fats,  such as omega 3 fats, are found in fish, such as salmon, trout, and tuna  They can also be found in plant foods such as flaxseed, walnuts, and soybeans  How unhealthy fats affect your cholesterol levels:  Unhealthy fats increase LDL cholesterol and triglyceride levels  They are found in foods high in cholesterol, saturated fat, and trans fat:  · Cholesterol  is found in eggs, dairy, and meat  · Saturated fat  is found in butter, cheese, ice cream, whole milk, and coconut oil  Saturated fat is also found in meat, such as sausage, hot dogs, and bologna  · Trans fat  is found in liquid oils and is used in fried and baked foods  Foods that contain trans fats include chips, crackers, muffins, sweet rolls, microwave popcorn, and cookies  Treatment  for high cholesterol will also decrease your risk of heart disease, heart attack, and stroke  Treatment may include any of the following:  · Lifestyle changes  may include food, exercise, weight loss, and quitting smoking  You may also need to decrease the amount of alcohol you drink  Your healthcare provider will want you to start with lifestyle changes  Other treatment may be added if lifestyle changes are not enough  Your healthcare provider may recommend you work with a team to manage hyperlipidemia  The team may include medical experts such as a dietitian, an exercise or physical therapist, and a behavior therapist  Your family members may be included in helping you create lifestyle changes      · Medicines  may be given to lower your LDL cholesterol, triglyceride levels, or total cholesterol level  You may need medicines to lower your cholesterol if any of the following is true:    ? You have a history of stroke, TIA, unstable angina, or a heart attack  ? Your LDL cholesterol level is 190 mg/dL or higher  ? You are age 36 to 76 years, have diabetes or heart disease risk factors, and your LDL cholesterol is 70 mg/dL or higher  · Supplements  include fish oil, red yeast rice, and garlic  Fish oil may help lower your triglyceride and LDL cholesterol levels  It may also increase your HDL cholesterol level  Red yeast rice may help decrease your total cholesterol level and LDL cholesterol level  Garlic may help lower your total cholesterol level  Do not take any supplements without talking to your healthcare provider  Food changes you can make to lower your cholesterol levels:  A dietitian can help you create a healthy eating plan  He or she can show you how to read food labels and choose foods low in saturated fat, trans fats, and cholesterol  · Decrease the total amount of fat you eat  Choose lean meats, fat-free or 1% fat milk, and low-fat dairy products, such as yogurt and cheese  Try to limit or avoid red meats  Limit or do not eat fried foods or baked goods, such as cookies  · Replace unhealthy fats with healthy fats  Cook foods in olive oil or canola oil  Choose soft margarines that are low in saturated fat and trans fat  Seeds, nuts, and avocados are other examples of healthy fats  · Eat foods with omega-3 fats  Examples include salmon, tuna, mackerel, walnuts, and flaxseed  Eat fish 2 times per week  Pregnant women should not eat fish that have high levels of mercury, such as shark, swordfish, and sherri mackerel  · Increase the amount of high-fiber foods you eat  High-fiber foods can help lower your LDL cholesterol  Aim to get between 20 and 30 grams of fiber each day  Fruits and vegetables are high in fiber   Eat at least 5 servings each day  Other high-fiber foods are whole-grain or whole-wheat breads, pastas, or cereals, and brown rice  Eat 3 ounces of whole-grain foods each day  Increase fiber slowly  You may have abdominal discomfort, bloating, and gas if you add fiber to your diet too quickly  · Eat healthy protein foods  Examples include low-fat dairy products, skinless chicken and turkey, fish, and nuts  · Limit foods and drinks that are high in sugar  Your dietitian or healthcare provider can help you create daily limits for high-sugar foods and drinks  The limit may be lower if you have diabetes or another health condition  Limits can also help you lose weight if needed  Lifestyle changes you can make to lower your cholesterol levels:   · Maintain a healthy weight  Ask your healthcare provider what a healthy weight is for you  Ask him or her to help you create a weight loss plan if needed  Weight loss can decrease your total cholesterol and triglyceride levels  Weight loss may also help keep your blood pressure at a healthy level  · Be physically active throughout the day  Physical activity, such as exercise, can help lower your total cholesterol level and maintain a healthy weight  Physical activity can also help increase your HDL cholesterol level  Work with your healthcare provider to create an program that is right for you  Get at least 30 to 40 minutes of moderate physical activity most days of the week  Examples of exercise include brisk walking, swimming, or biking  Also include strength training at least 2 times each week  Your healthcare providers can help you create a physical activity plan  · Do not smoke  Nicotine and other chemicals in cigarettes and cigars can raise your cholesterol levels  Ask your healthcare provider for information if you currently smoke and need help to quit  E-cigarettes or smokeless tobacco still contain nicotine   Talk to your healthcare provider before you use these products  · Limit or do not drink alcohol  Alcohol can increase your triglyceride levels  Ask your healthcare provider before you drink alcohol  Ask how much is okay for you to drink in 24 hours or 1 week  Follow up with your doctor as directed:  Write down your questions so you remember to ask them during your visits  © Copyright [employer] 2022 Information is for End User's use only and may not be sold, redistributed or otherwise used for commercial purposes  All illustrations and images included in CareNotes® are the copyrighted property of [employer]  or [facility] Mayo Gonzalez   The above information is an  only  It is not intended as medical advice for individual conditions or treatments  Talk to your doctor, nurse or pharmacist before following any medical regimen to see if it is safe and effective for you

## 2022-05-13 LAB
HCV AB SER QL: NORMAL
HIV 1+2 AB+HIV1 P24 AG SERPL QL IA: NORMAL

## 2022-05-14 LAB
C TRACH DNA SPEC QL NAA+PROBE: NEGATIVE
N GONORRHOEA DNA SPEC QL NAA+PROBE: NEGATIVE

## 2022-05-16 ENCOUNTER — TELEPHONE (OUTPATIENT)
Dept: FAMILY MEDICINE CLINIC | Facility: CLINIC | Age: 32
End: 2022-05-16

## 2022-05-16 LAB
GAMMA INTERFERON BACKGROUND BLD IA-ACNC: 0.03 IU/ML
M TB IFN-G BLD-IMP: NEGATIVE
M TB IFN-G CD4+ BCKGRND COR BLD-ACNC: -0.01 IU/ML
M TB IFN-G CD4+ BCKGRND COR BLD-ACNC: -0.01 IU/ML
MITOGEN IGNF BCKGRD COR BLD-ACNC: >10 IU/ML

## 2022-05-19 ENCOUNTER — OFFICE VISIT (OUTPATIENT)
Dept: DENTISTRY | Facility: CLINIC | Age: 32
End: 2022-05-19

## 2022-05-19 VITALS — TEMPERATURE: 97.3 F | SYSTOLIC BLOOD PRESSURE: 112 MMHG | DIASTOLIC BLOOD PRESSURE: 79 MMHG | HEART RATE: 78 BPM

## 2022-05-19 DIAGNOSIS — Z01.20 ENCOUNTER FOR DENTAL EXAMINATION: Primary | ICD-10-CM

## 2022-05-19 PROCEDURE — D0120 PERIODIC ORAL EVALUATION - ESTABLISHED PATIENT: HCPCS | Performed by: DENTIST

## 2022-05-19 PROCEDURE — D1330 ORAL HYGIENE INSTRUCTIONS: HCPCS | Performed by: DENTAL HYGIENIST

## 2022-05-19 PROCEDURE — D0274 BITEWINGS - 4 RADIOGRAPHIC IMAGES: HCPCS | Performed by: DENTAL HYGIENIST

## 2022-05-19 PROCEDURE — D1110 PROPHYLAXIS - ADULT: HCPCS | Performed by: DENTAL HYGIENIST

## 2022-05-19 NOTE — PROGRESS NOTES
Dental procedures in this visit     - PERIODIC ORAL EVALUATION - ESTABLISHED PATIENT (Completed)     Service provider: Davina Christie     Billing provider: Vilma Gonzalez DMD     - BITEWINGS - 4 RADIOGRAPHIC IMAGES (Completed)     Service provider: Yeison Clark 195     Billing provider: Vilma Gonzalez DMD     - PROPHYLAXIS - ADULT (Completed)     Service provider: Yeison Clark 195     Billing provider: Vilma Gonzalez DMD     - ORAL HYGIENE INSTRUCTIONS (Completed)     Service provider: Yeison Clark     Billing provider: Vilma Gonzalez DMD     Subjective   Patient ID: Demian Hester is a 29 y o  female  Chief Complaint   Patient presents with    Routine Oral Cleaning    Periodic Exam     ASA  I;  Reviewed M/DH    Adult Prophy     Exams:  Periodic exam   Xrays:  4 BWX     Type of Treatment:  Adult Prophy - used Ultrasonic and Hand scaling,  Polished, Flossed  Reviewed OHI  Brush:  2X/day and Floss 1X/day  Recommended Listerine  / ACT  mouth rinses  Taught how to floss -  Recommended Glide floss  EO/OCS Exams:  No significant findings  IO: No significant findings  Occlusion:   Class I  Oral Hygiene:  Good / Fair   Plaque:  Light  / Moderate    Calculus:  Light  - generalized  / Moderate sub - localized in molars  Bleeding: Moderate    Gingiva:    Red  / Spongy /  Inflamed  Stain:  none  Perio Charting:  Periocharting was not completed  Probe at next recall  Perio Findings: Moderate gingivitis  Caries Findings:  28  - DO, 29 - DO;   Watch - 20 - D;  Post and core/Crown on #19 still to do but pt is having discomfort on 34 when food gets packed in the hole - so do 28, 29 first   Caries Risk Assessment:   Moderate caries risk    Treatment Plan:  Updated    Dr  Exam:  Dr Khadra Qureshi  Referral:  No referral given   NV1:  Rest 28 - DO, 29 - DO - 75 min  NV2: 6mrc w/ FMP - 60 min

## 2022-06-27 ENCOUNTER — TELEPHONE (OUTPATIENT)
Dept: FAMILY MEDICINE CLINIC | Facility: CLINIC | Age: 32
End: 2022-06-27

## 2022-06-27 NOTE — LETTER
June 28, 2022     Patient: Mat Stewart  YOB: 1990      To Whom it May Concern:    Mat Stewart is under my professional care  She is five days post covid symptoms  Sandro Oshea may return to work as of 06/28/2022  If you have any questions or concerns, please don't hesitate to call           Sincerely,          Tonio Asencio DO      CC: No Recipients

## 2022-06-27 NOTE — TELEPHONE ENCOUNTER
Patient had a positive covid test Wednesday 6- at her second job and would like to return to work at [employer] because she is 5 days out and has no symptoms  And she needs a note  Patient is wondering if you would give her a note

## 2022-07-07 ENCOUNTER — OFFICE VISIT (OUTPATIENT)
Dept: DENTISTRY | Facility: CLINIC | Age: 32
End: 2022-07-07

## 2022-07-07 VITALS — SYSTOLIC BLOOD PRESSURE: 111 MMHG | TEMPERATURE: 96.9 F | DIASTOLIC BLOOD PRESSURE: 78 MMHG | HEART RATE: 74 BPM

## 2022-07-07 DIAGNOSIS — K02.9 CARIES: Primary | ICD-10-CM

## 2022-07-07 PROCEDURE — D2393 RESIN-BASED COMPOSITE - 3 SURFACES, POSTERIOR: HCPCS

## 2022-07-07 NOTE — PROGRESS NOTES
Composite Filling    Rodriguez Bazan presents for composite filling on tooth 29  PMH reviewed, no changes  Discussed with patient need for RCT if pulp exposure occurs or in future if pulp is inflamed  Pt understands and consents  Applied topical benzocaine, administered 1 carp 2% lido via mental nerve block     Prepped tooth #29 with 245 carbide on high speed and removed existing MO amalgam  Caries removed with round carbide on slow speed  Placed  Matrix, wedge and almeida ring  Isolation with cotton rolls and dri-angles    Etch with 37% H2PO4, rinse, dry  Applied Adhese with 20 second scrub once, gentle air dry and light cured for 10s  Restored with Tetric bulk sherif shade A2 and light cured  Refined with finishing burs, polished with enhance point  Verified occlusion and contacts  Pt left satisfied      NV: 28 DO

## 2022-07-08 ENCOUNTER — OFFICE VISIT (OUTPATIENT)
Dept: DENTISTRY | Facility: CLINIC | Age: 32
End: 2022-07-08

## 2022-07-08 VITALS — HEART RATE: 91 BPM | SYSTOLIC BLOOD PRESSURE: 132 MMHG | TEMPERATURE: 97.3 F | DIASTOLIC BLOOD PRESSURE: 81 MMHG

## 2022-07-08 DIAGNOSIS — K02.9 CARIES: Primary | ICD-10-CM

## 2022-07-08 PROCEDURE — D2392 RESIN-BASED COMPOSITE - 2 SURFACES, POSTERIOR: HCPCS

## 2022-07-08 NOTE — PROGRESS NOTES
Composite Filling     Sobia Cartwright presents for composite filling on tooth 28  PMH reviewed, no changes         Applied topical benzocaine, administered 1 carp 2% lido via mental nerve block      Prepped tooth #28 with 330 and 245 carbide on high speed and removed existing MO composite Caries removed with burs  Placed  Matrix, wedge and almeida ring  Isolation with dryshield     Etch with 37% H2PO4, rinse, dry  Applied Adhese with 20 second scrub once, gentle air dry and light cured for 10s  Restored with Tetric bulk sherif shade A2 and light cured      Refined with finishing burs, polished with enhance point  Verified occlusion and contacts   Pt left satisfied      NV: 19 post placement

## 2022-08-03 ENCOUNTER — ULTRASOUND (OUTPATIENT)
Dept: OBGYN CLINIC | Facility: CLINIC | Age: 32
End: 2022-08-03

## 2022-08-03 ENCOUNTER — APPOINTMENT (OUTPATIENT)
Dept: LAB | Facility: CLINIC | Age: 32
End: 2022-08-03
Payer: COMMERCIAL

## 2022-08-03 VITALS
HEART RATE: 111 BPM | WEIGHT: 174.8 LBS | DIASTOLIC BLOOD PRESSURE: 87 MMHG | BODY MASS INDEX: 29.09 KG/M2 | SYSTOLIC BLOOD PRESSURE: 155 MMHG

## 2022-08-03 DIAGNOSIS — Z3A.11 11 WEEKS GESTATION OF PREGNANCY: ICD-10-CM

## 2022-08-03 DIAGNOSIS — N91.2 AMENORRHEA: Primary | ICD-10-CM

## 2022-08-03 LAB
ABO GROUP BLD: NORMAL
BASOPHILS # BLD AUTO: 0.02 THOUSANDS/ΜL (ref 0–0.1)
BASOPHILS NFR BLD AUTO: 0 % (ref 0–1)
BLD GP AB SCN SERPL QL: NEGATIVE
EOSINOPHIL # BLD AUTO: 0.05 THOUSAND/ΜL (ref 0–0.61)
EOSINOPHIL NFR BLD AUTO: 0 % (ref 0–6)
ERYTHROCYTE [DISTWIDTH] IN BLOOD BY AUTOMATED COUNT: 11.9 % (ref 11.6–15.1)
HBV SURFACE AG SER QL: NORMAL
HCT VFR BLD AUTO: 38.8 % (ref 34.8–46.1)
HCV AB SER QL: NORMAL
HGB BLD-MCNC: 12.1 G/DL (ref 11.5–15.4)
IMM GRANULOCYTES # BLD AUTO: 0.03 THOUSAND/UL (ref 0–0.2)
IMM GRANULOCYTES NFR BLD AUTO: 0 % (ref 0–2)
LYMPHOCYTES # BLD AUTO: 2.21 THOUSANDS/ΜL (ref 0.6–4.47)
LYMPHOCYTES NFR BLD AUTO: 17 % (ref 14–44)
MCH RBC QN AUTO: 28 PG (ref 26.8–34.3)
MCHC RBC AUTO-ENTMCNC: 31.2 G/DL (ref 31.4–37.4)
MCV RBC AUTO: 90 FL (ref 82–98)
MONOCYTES # BLD AUTO: 0.76 THOUSAND/ΜL (ref 0.17–1.22)
MONOCYTES NFR BLD AUTO: 6 % (ref 4–12)
NEUTROPHILS # BLD AUTO: 9.62 THOUSANDS/ΜL (ref 1.85–7.62)
NEUTS SEG NFR BLD AUTO: 77 % (ref 43–75)
NRBC BLD AUTO-RTO: 0 /100 WBCS
PLATELET # BLD AUTO: 388 THOUSANDS/UL (ref 149–390)
PMV BLD AUTO: 10.8 FL (ref 8.9–12.7)
RBC # BLD AUTO: 4.32 MILLION/UL (ref 3.81–5.12)
RH BLD: POSITIVE
RUBV IGG SERPL IA-ACNC: 40.7 IU/ML
SL AMB POCT URINE HCG: POSITIVE
SPECIMEN EXPIRATION DATE: NORMAL
WBC # BLD AUTO: 12.69 THOUSAND/UL (ref 4.31–10.16)

## 2022-08-03 PROCEDURE — 86803 HEPATITIS C AB TEST: CPT

## 2022-08-03 PROCEDURE — 81025 URINE PREGNANCY TEST: CPT | Performed by: OBSTETRICS & GYNECOLOGY

## 2022-08-03 PROCEDURE — 36415 COLL VENOUS BLD VENIPUNCTURE: CPT

## 2022-08-03 PROCEDURE — 76801 OB US < 14 WKS SINGLE FETUS: CPT | Performed by: OBSTETRICS & GYNECOLOGY

## 2022-08-03 PROCEDURE — 99213 OFFICE O/P EST LOW 20 MIN: CPT | Performed by: OBSTETRICS & GYNECOLOGY

## 2022-08-03 PROCEDURE — 80081 OBSTETRIC PANEL INC HIV TSTG: CPT

## 2022-08-03 RX ORDER — PYRIDOXINE HCL (VITAMIN B6) 25 MG
25 TABLET ORAL DAILY
Qty: 30 TABLET | Refills: 0 | Status: SHIPPED | OUTPATIENT
Start: 2022-08-03 | End: 2022-09-06 | Stop reason: SDUPTHER

## 2022-08-03 RX ORDER — PRENATAL WITH FERROUS FUM AND FOLIC ACID 3080; 920; 120; 400; 22; 1.84; 3; 20; 10; 1; 12; 200; 27; 25; 2 [IU]/1; [IU]/1; MG/1; [IU]/1; MG/1; MG/1; MG/1; MG/1; MG/1; MG/1; UG/1; MG/1; MG/1; MG/1; MG/1
1 TABLET ORAL DAILY
Qty: 30 TABLET | Refills: 6 | Status: SHIPPED | OUTPATIENT
Start: 2022-08-03

## 2022-08-03 NOTE — PROGRESS NOTES
FIRST TRIMESTER OBSTETRIC ULTRASOUND  8/3/2022   María Elena Atkins MD     INDICATION: Amenorrhea, viability    Patient is a 28 yo  who presents today for a viability scan  She reports that she took Plan B on , and then had intercourse again thereafter  She thinks her LMP is 6/15/22, but is unsure due to prolonged bleeding after taking Plan B      COMPARISON: None  TECHNIQUE:   Transvaginal imaging was performed to assess the gestation, myometrial/endometrial architecture and ovarian parenchymal detail  The study includes volumetric sweeps and traditional still imaging technique  FINDINGS:     A single intrauterine gestation is identified  Cardiac activity is detected at 160 bpm       YOLK SAC:  Present and normal in size and appearance  MEAN CROWN RUMP LENGTH:  41 8 mm = 11 weeks 1 days   AMNIOTIC FLUID/SAC SHAPE:  Within expected normal range  UTERUS/ADNEXA:   No adnexal mass or pathologic cyst   No free fluid identified  IMPRESSION:     Single intrauterine pregnancy of 11 weeks 1d gestational age  Fetal cardiac activity detected  No adnexal masses seen    Unsure of LMP, will use ultrasound for dating  ADRIANNA 23 by ultrasound

## 2022-08-04 ENCOUNTER — TELEPHONE (OUTPATIENT)
Dept: OBGYN CLINIC | Facility: CLINIC | Age: 32
End: 2022-08-04

## 2022-08-04 LAB
HIV 1+2 AB+HIV1 P24 AG SERPL QL IA: NORMAL
RPR SER QL: NORMAL

## 2022-08-16 ENCOUNTER — INITIAL PRENATAL (OUTPATIENT)
Dept: OBGYN CLINIC | Facility: CLINIC | Age: 32
End: 2022-08-16

## 2022-08-16 ENCOUNTER — PATIENT OUTREACH (OUTPATIENT)
Dept: OBGYN CLINIC | Facility: CLINIC | Age: 32
End: 2022-08-16

## 2022-08-16 ENCOUNTER — APPOINTMENT (OUTPATIENT)
Dept: LAB | Facility: CLINIC | Age: 32
End: 2022-08-16
Payer: COMMERCIAL

## 2022-08-16 VITALS
BODY MASS INDEX: 29.42 KG/M2 | HEART RATE: 81 BPM | DIASTOLIC BLOOD PRESSURE: 81 MMHG | HEIGHT: 65 IN | WEIGHT: 176.6 LBS | SYSTOLIC BLOOD PRESSURE: 131 MMHG

## 2022-08-16 DIAGNOSIS — Z34.91 PRENATAL CARE IN FIRST TRIMESTER: Primary | ICD-10-CM

## 2022-08-16 DIAGNOSIS — Z34.91 PRENATAL CARE IN FIRST TRIMESTER: ICD-10-CM

## 2022-08-16 DIAGNOSIS — Z3A.13 13 WEEKS GESTATION OF PREGNANCY: ICD-10-CM

## 2022-08-16 LAB
BACTERIA UR QL AUTO: ABNORMAL /HPF
BILIRUB UR QL STRIP: NEGATIVE
CAOX CRY URNS QL MICRO: ABNORMAL /HPF
CLARITY UR: CLEAR
COLOR UR: YELLOW
GLUCOSE UR STRIP-MCNC: NEGATIVE MG/DL
HGB UR QL STRIP.AUTO: NEGATIVE
KETONES UR STRIP-MCNC: ABNORMAL MG/DL
LEUKOCYTE ESTERASE UR QL STRIP: NEGATIVE
MUCOUS THREADS UR QL AUTO: ABNORMAL
NITRITE UR QL STRIP: NEGATIVE
NON-SQ EPI CELLS URNS QL MICRO: ABNORMAL /HPF
PH UR STRIP.AUTO: 6.5 [PH]
PROT UR STRIP-MCNC: ABNORMAL MG/DL
RBC #/AREA URNS AUTO: ABNORMAL /HPF
SP GR UR STRIP.AUTO: 1.02 (ref 1–1.03)
UROBILINOGEN UR STRIP-ACNC: 2 MG/DL
WBC #/AREA URNS AUTO: ABNORMAL /HPF

## 2022-08-16 PROCEDURE — 36415 COLL VENOUS BLD VENIPUNCTURE: CPT

## 2022-08-16 PROCEDURE — 83020 HEMOGLOBIN ELECTROPHORESIS: CPT

## 2022-08-16 PROCEDURE — 81001 URINALYSIS AUTO W/SCOPE: CPT

## 2022-08-16 PROCEDURE — T1001 NURSING ASSESSMENT/EVALUATN: HCPCS

## 2022-08-16 PROCEDURE — 87086 URINE CULTURE/COLONY COUNT: CPT

## 2022-08-16 NOTE — LETTER
[facility] REFERRAL      Date: 8/16/2022    Patient name: Juaquin Farias    YOB: 1990    Estimated Date of Delivery: 2/21/23      /81   Pulse 81   Ht 5' 5" (1 651 m)   Wt 80 1 kg (176 lb 9 6 oz)   LMP 06/15/2022 (Exact Date)   BMI 29 39 kg/m²         Thank you,  Farida Patel, [address] [place] [address] locations:   1  [facility] and [address]       [address]       Phone: [phone]       Fax: [phone]     2   [address]        [address]       Phone: [phone]       Fax: [phone]

## 2022-08-16 NOTE — LETTER
8/16/2022      This letter is to confirm that Brandt De Paz is pregnant and is under our care  Her Estimated Date of Delivery: 2/21/23  If you have any questions or concerns, please contact our office    Thank you,    DOMONIQUE Angeles

## 2022-08-16 NOTE — PATIENT INSTRUCTIONS
WARNING SIGNS DURING PREGNANCY  Call our office at [phone] for any of the followin  Vaginal bleeding  2  Sharp abdominal pain that does not go away  3  Fever (more than 100 4 and is not relieved by Tylenol)  4  Persistent vomiting lasting greater than 24 hours  5  Chest pain   6  Pain or burning when you urinate  7  Severe headache that doesn't resolve with Tylenol  8  Blurred vision or seeing spots in your vision  9  Sudden swelling of your face or hands  10  Redness, swelling or pain in a leg  11  A sudden weight gain in just a few days  12  Decrease in your baby's movement (after 28 weeks or the 6th month of pregnancy)  13  A loss of watery fluid from your vagina - can be a gush, a trickle or continuous wetness  14   After 20 weeks of pregnancy, rhythmic cramping (greater than 4 per hour) or menstrual like low/pelvic pain

## 2022-08-16 NOTE — PROGRESS NOTES
EDISON NYE was referred by Marina Maria for a PN SW assessment in the first trimester  This is a [id number] expectant mother with ADRIANNA of 2/21/23  EDISON NYE met with the patient in the office today to complete psychosocial assessment:    The patient reports this pregnancy is unplanned but welcomed  She was on a break with her partner (same sex partner) and had sex with someone else which resulted in a pregnancy  She reports she and her partner had discussed AI at points in their relationship however she did not always agree with this plan  She reports that FOB would like to be involved which has made the relationship with her partner a little difficult  Pt currently lives alone in her apartment, she may move in with her partner when her lease is up in December but reports she will see how things go and she likes to be independent  Pts mother and sister live close by and are excited for the pregnancy  This will be her mothers first grandchild  She reports good help and support from family  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[facility] [employer]  Pt has MA, SNAP and will apply for [facility]  Pt has been working for [employer] for one year and is eligible to apply for benefits in November  EDISON NYE and pt discussed applying for benefits to receive STD during her maternity leave  Pt drives herself  She has no concerns obtaining baby supplies  Pt denies any MH, CYS, legal, or IPV  Pt was casually drinking alcohol before she found out she was pregnant and reports this is a stressor as she wants to make sure she did not harm the baby  She reports cessation of ETOH since she found out she was pregnant  Pt reports being very anxious about the pregnancy going well  EDISON NYE will keep this patient surveillance and will /u in three months to see how she is doing

## 2022-08-16 NOTE — PROGRESS NOTES
OBSTETRIC INTAKE VISIT    Anna Montelongo presents today for initial OB visit and intake at 13w0d  History obtained from patient and she reports it as follows:    Past Medical History:   Diagnosis Date    Anxiety     no medications    Chlamydia     early     Urogenital trichomoniasis      Past Surgical History:   Procedure Laterality Date    INDUCED       Surgically induced  Description: 2012      OB History    Para Term  AB Living   2 0 0 0 1 0   SAB IAB Ectopic Multiple Live Births   0 1 0 0 0      # Outcome Date GA Lbr Sherman/2nd Weight Sex Delivery Anes PTL Lv   2 Current            1 IAB 2012 8w0d            Social History     Tobacco Use    Smoking status: Never Smoker    Smokeless tobacco: Former User    Tobacco comment: hookah   Vaping Use    Vaping Use: Never used   Substance Use Topics    Alcohol use: Not Currently     Comment: occasionally but stopped when found out pregnant    Drug use: Never       Current Outpatient Medications   Medication Instructions    cetirizine (ZyrTEC) 10 mg tablet No dose, route, or frequency recorded   doxylamine (UNISOM) 25 mg, Oral, Daily at bedtime PRN    Multiple Vitamins-Minerals (multivitamin with minerals) tablet 1 tablet, Daily    Prenatal 27-1 MG TABS 1 tablet, Oral, Daily    pyridoxine (B-6) 25 mg, Oral, Daily       No Known Allergies    Vitals: /81   Pulse 81   Ht 5' 5" (1 651 m)   Wt 80 1 kg (176 lb 9 6 oz)   LMP 06/15/2022 (Exact Date)   BMI 29 39 kg/m²     Review of Systems:  Denies vaginal bleeding or leaking fluid  Denies abdominal/pelvic pain or contractions  Plan:  1  OB labwork ordered today to include Hgb fractionation cascade, Prenatal Carrier Screen Panel  Advised patient to have drawn within the next few days  2  Next ultrasound is scheduled for   3  Return 2022 for H&P prenatal visit  4  Referrals placed for [facility], , and [facility]    5  Given vaccines: None due  6  Patient's depression screening was assessed with a PHQ-2 score of 2  Their PHQ-9 score was 5  Clinically patient does not have depression  No treatment is required   in to see patient    7  Reviewed the following educational topics with patient:   -routine prenatal visit/ultrasound/labwork schedule   -hospital for delivery and office phone/answering service contact information   -nutritional demands of pregnancy, healthy dietary habits   -listeria, toxoplasmosis, seafood precautions   -weight gain expectations (based on pre-pregnant BMI)   -exercise, rest, and sexual activity during pregnancy   -abstinence from alcohol, tobacco, and illegal drugs   -common discomforts of pregnancy and appropriate management   -OTC medications safe to use in pregnancy   -genetic screening options   -vaccines in pregnancy   -symptoms to report to OB provider    -signs of PTL and pre-eclampsia    -vaginal bleeding/leaking of fluid    -severe n/v-unable to tolerate ANY food/fluids for more than 24 hours

## 2022-08-17 ENCOUNTER — INITIAL PRENATAL (OUTPATIENT)
Dept: OBGYN CLINIC | Facility: CLINIC | Age: 32
End: 2022-08-17

## 2022-08-17 VITALS
HEART RATE: 79 BPM | SYSTOLIC BLOOD PRESSURE: 112 MMHG | WEIGHT: 177.6 LBS | DIASTOLIC BLOOD PRESSURE: 75 MMHG | BODY MASS INDEX: 29.55 KG/M2

## 2022-08-17 DIAGNOSIS — R20.2 TINGLING: ICD-10-CM

## 2022-08-17 DIAGNOSIS — Z34.91 PRENATAL CARE IN FIRST TRIMESTER: Primary | ICD-10-CM

## 2022-08-17 PROBLEM — R11.0 NAUSEA: Status: ACTIVE | Noted: 2022-08-17

## 2022-08-17 PROBLEM — Z34.90 PRENATAL CARE: Status: ACTIVE | Noted: 2022-08-17

## 2022-08-17 PROBLEM — K59.00 CONSTIPATION: Status: ACTIVE | Noted: 2022-08-17

## 2022-08-17 PROCEDURE — 87491 CHLMYD TRACH DNA AMP PROBE: CPT

## 2022-08-17 PROCEDURE — G0124 SCREEN C/V THIN LAYER BY MD: HCPCS | Performed by: PATHOLOGY

## 2022-08-17 PROCEDURE — G0476 HPV COMBO ASSAY CA SCREEN: HCPCS

## 2022-08-17 PROCEDURE — 87591 N.GONORRHOEAE DNA AMP PROB: CPT

## 2022-08-17 PROCEDURE — 99214 OFFICE O/P EST MOD 30 MIN: CPT | Performed by: OBSTETRICS & GYNECOLOGY

## 2022-08-17 PROCEDURE — G0145 SCR C/V CYTO,THINLAYER,RESCR: HCPCS | Performed by: PATHOLOGY

## 2022-08-17 RX ORDER — ONDANSETRON 4 MG/1
4 TABLET, ORALLY DISINTEGRATING ORAL EVERY 6 HOURS PRN
Qty: 20 TABLET | Refills: 0 | Status: SHIPPED | OUTPATIENT
Start: 2022-08-17 | End: 2022-09-06 | Stop reason: SDUPTHER

## 2022-08-17 RX ORDER — DOCUSATE SODIUM 100 MG/1
100 CAPSULE, LIQUID FILLED ORAL 2 TIMES DAILY PRN
Start: 2022-08-17

## 2022-08-17 NOTE — PROGRESS NOTES
OB/GYN  PRENATAL H&P VISIT  Bubba Winter  2022  1:36 PM  Dr Humza Caruso MD      SUBJECTIVE  Bubba Winter is a 29 y o   at 13w2d here for initial prenatal H&P  This is an intended and desired pregnancy  She is currently doing well  She reports a one week history of tingling in her fourth and fifth digits on her left hand for the past week   She denies pain or trauma to the area, swelling, or neck pain  Patient also wanted to review urinalysis results  She has hx of trichomonas in 2019 and chlamydia in the past that were both treated, denies a hx of TB or close contacts with persons with TB  She denies a family history of inheritable conditions such as physical or intellectual disabilities, birth defects, blood disorders, heart or neural tube defects  She denies recent travel or travel planned in the near future  She denies use of nicotine or recreational drug use  She denies use of ETOH  She denies vaginal bleeding, cramping, leakage, abnormal discharge  OB History    Para Term  AB Living   2 0 0 0 1 0   SAB IAB Ectopic Multiple Live Births   0 1 0 0 0      # Outcome Date GA Lbr Sherman/2nd Weight Sex Delivery Anes PTL Lv   2 Current            1 IAB 2012 8w0d              Review of Systems   Constitutional: Negative for chills and fever  HENT: Negative for ear pain and sore throat  Eyes: Negative for pain and visual disturbance  Respiratory: Negative for cough and shortness of breath  Cardiovascular: Negative for chest pain and palpitations  Gastrointestinal: Positive for constipation and nausea  Negative for abdominal pain and vomiting  Genitourinary: Negative for dysuria and hematuria  Musculoskeletal: Negative for arthralgias and back pain  Skin: Negative for color change and rash  Neurological: Negative for seizures, syncope and weakness          One week of tingling in left fourth and fifth digits   All other systems reviewed and are negative  Past Medical History:   Diagnosis Date    Anxiety     no medications    Chlamydia     early     Urogenital trichomoniasis 2019       Past Surgical History:   Procedure Laterality Date    INDUCED       Surgically induced  Description: 2012        Social History     Socioeconomic History    Marital status: Single     Spouse name: Not on file    Number of children: Not on file    Years of education: Not on file    Highest education level: Not on file   Occupational History     Employer: [employer]  Occupation: employed   Tobacco Use    Smoking status: Never Smoker    Smokeless tobacco: Former User    Tobacco comment: hookah   Vaping Use    Vaping Use: Never used   Substance and Sexual Activity    Alcohol use: Not Currently     Comment: occasionally but stopped when found out pregnant    Drug use: Never    Sexual activity: Yes     Partners: Female, Male   Other Topics Concern    Not on file   Social History Narrative    Not on file     Social Determinants of Health     Financial Resource Strain: Low Risk     Difficulty of Paying Living Expenses: Not hard at all   Food Insecurity: No Food Insecurity    Worried About [address] in the Last Year: Never true    [address] in the Last Year: Never true   Transportation Needs: No Transportation Needs    Lack of Transportation (Medical): No    Lack of Transportation (Non-Medical): No   Physical Activity: Not on file   Stress: Not on file   Social Connections: Not on file   Intimate Partner Violence: Not on file   Housing Stability: Low Risk     Unable to Pay for Housing in the Last Year: No    Number of Places Lived in the Last Year: 1    Unstable Housing in the Last Year: No       OBJECTIVE  Vitals:    22 1252   BP: 112/75   Pulse: 79     Physical Exam  Constitutional:       Appearance: Normal appearance  Genitourinary:      Right Labia: No rash, tenderness or lesions       Left Labia: No tenderness, lesions or rash  No vaginal discharge, erythema, tenderness or ulceration  Right Adnexa: not tender, not full and no mass present  Left Adnexa: not tender, not full and no mass present  No cervical discharge or lesion  HENT:      Head: Normocephalic and atraumatic  Neck:      Comments: No thyromegaly, no nodules  Cardiovascular:      Rate and Rhythm: Normal rate and regular rhythm  Heart sounds: Normal heart sounds  Pulmonary:      Effort: Pulmonary effort is normal       Breath sounds: Normal breath sounds  Abdominal:      Palpations: Abdomen is soft  Comments: Gravid uterus 13 weeks   Musculoskeletal:         General: No swelling or tenderness  Normal range of motion  Cervical back: Normal range of motion  Right lower leg: No edema  Left lower leg: No edema  Neurological:      General: No focal deficit present  Mental Status: She is alert and oriented to person, place, and time  Mental status is at baseline  Skin:     General: Skin is warm and dry  Capillary Refill: Capillary refill takes less than 2 seconds  ASSESSMENT AND PLAN    29 y o ,  at 13w1d by [address] [facility] here for her prenatal H&P  She reports tingling in her left fourth and fifth digits, as well as nausea and constipation  She will follow up outpatient with neurology  Zofran prescribed for nausea and Colace prescribed for constipation  Will monitor symptoms and follow-up at next prenatal visit in four weeks  Patient also wanted to review urinalysis results  As she had occasional calcium oxalate crystals in her urine, patient advised to reduce dairy intake and to not take Tums, as well as increase water intake  FHT 145bpm by Doppler  Pregnancy: H&P completed today  PN Labs reviewed today  Labor expectations discussed with patient, including appointment schedule, nutrition, exercise, medications, sexual intercourse, and nausea/vomiting  Patient's BMI is 29 39kg/m2  Recommended weight gain is 15-25 lbs  Screening: Pap smear last done 2019 and was normal  Pap with HPV cotesting taken today  GC/CT collected  Sequential screening reviewed with patient - will proceed with genetic screening  Consents: Delivery process including potential OVD and  reviewed  Sign delivery consent form at 28 weeks  Labor: For analgesia, patient is undecided on epidural     Contraception: Different methods of contraception were discussed with patient, including progesterone only oral pills, depo provera, nexplanon, mirena, and paragard  Patient is undecided at this time  Follow up: RTC in 4 weeks  Precautions regarding labor, leakage, bleeding, and fetal movement reviewed        Ziyad Chiang MD  2022  1:36 PM

## 2022-08-17 NOTE — PATIENT INSTRUCTIONS
Pregnancy at 11 to 14 Weeks   AMBULATORY CARE:   Changes happening to your body: You are now at the end of your first trimester and entering your second trimester  Morning sickness usually goes away by this time  You may have other symptoms such as fatigue, frequent urination, and headaches  You may have gained 2 to 4 pounds by now  Seek care immediately if:   You have pain or cramping in your abdomen or low back  You have heavy vaginal bleeding or clotting  You pass material that looks like tissue or large clots  Collect the material and bring it with you  Call your doctor or obstetrician if:   You cannot keep food or drinks down, and you are losing weight  You have light vaginal bleeding  You have chills or a fever  You have vaginal itching, burning, or pain  You have yellow, green, white, or foul-smelling vaginal discharge  You have pain or burning when you urinate, less urine than usual, or pink or bloody urine  You have questions or concerns about your condition or care  How to care for yourself at this stage of your pregnancy:       Get plenty of rest   You may feel more tired than normal  You may need to take naps or go to bed earlier  Manage nausea and vomiting  Avoid fatty and spicy foods  Eat small meals throughout the day instead of large meals  Dominique may help to decrease nausea  Ask your healthcare provider about other ways of decreasing nausea and vomiting  Eat a variety of healthy foods  Healthy foods include fruits, vegetables, whole-grain breads, low-fat dairy foods, beans, lean meats, and fish  Drink liquids as directed  Ask how much liquid to drink each day and which liquids are best for you  Limit caffeine to less than 200 milligrams each day  Limit your intake of fish to 2 servings each week  Choose fish low in mercury such as canned light tuna, shrimp, salmon, cod, or tilapia   Do not  eat fish high in mercury such as swordfish, tilefish, sherri mackerel, and shark  Take prenatal vitamins as directed  Your need for certain vitamins and minerals, such as folic acid, increases during pregnancy  Prenatal vitamins provide some of the extra vitamins and minerals you need  Prenatal vitamins may also help to decrease the risk of certain birth defects  Do not smoke  Smoking increases your risk of a miscarriage and other health problems during your pregnancy  Smoking can cause your baby to be born too early or weigh less at birth  Ask your healthcare provider for information if you need help quitting  Do not drink alcohol  Alcohol passes from your body to your baby through the placenta  It can affect your baby's brain development and cause fetal alcohol syndrome (FAS)  FAS is a group of conditions that causes mental, behavior, and growth problems  Talk to your healthcare provider before you take any medicines  Many medicines may harm your baby if you take them when you are pregnant  Do not take any medicines, vitamins, herbs, or supplements without first talking to your healthcare provider  Never use illegal or street drugs (such as marijuana or cocaine) while you are pregnant  Safety tips during pregnancy:   Avoid hot tubs and saunas  Do not use a hot tub or sauna while you are pregnant, especially during your first trimester  Hot tubs and saunas may raise your baby's temperature and increase the risk of birth defects  Avoid toxoplasmosis  This is an infection caused by eating raw meat or being around infected cat feces  It can cause birth defects, miscarriages, and other problems  Wash your hands after you touch raw meat  Make sure any meat is well-cooked before you eat it  Avoid raw eggs and unpasteurized milk  Use gloves or ask someone else to clean your cat's litter box while you are pregnant  Changes happening with your baby: Your baby has fully formed fingernails and toenails  Your baby's heartbeat can now be heard   Ask your healthcare provider if you can listen to your baby's heartbeat  By week 14, your baby is over 4 inches long from the top of the head to the rump (baby's bottom)  Your baby weighs over 3 ounces  Prenatal care:  Prenatal care is a series of visits with your healthcare provider throughout your pregnancy  During the first 28 weeks of your pregnancy, you will see your healthcare provider 1 time each month  Prenatal care can help prevent problems during pregnancy and childbirth  Your healthcare provider will check your blood pressure and weight  Your baby's heart rate will also be checked  You may also need the following at some visits:  A pelvic exam  allows your healthcare provider to see your cervix (the bottom part of your uterus)  Your healthcare provider will use a speculum to open your vagina  He or she will check the size and shape of your uterus  Blood tests  may be done to check for any of the following:    Gestational diabetes or anemia (low iron level)    Blood type or Rh factor, or certain birth defects    Immunity to certain diseases, such as chickenpox or rubella    An infection, such as a sexually transmitted infection, HIV, or hepatitis B    Hepatitis B  may need to be prevented or treated  Hepatitis B is inflammation of the liver caused by the hepatitis B virus (HBV)  HBV can spread from a mother to her baby during delivery  You will be checked for HBV as early as possible in the first trimester of each pregnancy  You need the test even if you received the hepatitis B vaccine or were tested before  You may need to have an HBV infection treated before you give birth  Urine tests  may also be done to check for sugar and protein  These can be signs of gestational diabetes or preeclampsia  Urine tests may also be done to check for signs of infection  A fetal ultrasound  shows pictures of your baby inside your uterus  The pictures are used to check your baby's development, movement, and position  Genetic disorder screening tests  may be offered to you  These tests check your baby's risk for genetic disorders such as Down syndrome  A screening test includes a blood test and ultrasound  Follow up with your doctor or obstetrician as directed:  Go to all prenatal visits  Write down your questions so you remember to ask them during your visits  © Copyright [employer] 2022 Information is for End User's use only and may not be sold, redistributed or otherwise used for commercial purposes  All illustrations and images included in CareNotes® are the copyrighted property of A D A M [employer] Inc  or [facility]   The above information is an  only  It is not intended as medical advice for individual conditions or treatments  Talk to your doctor, nurse or pharmacist before following any medical regimen to see if it is safe and effective for you

## 2022-08-18 LAB
BACTERIA UR CULT: NORMAL
HPV HR 12 DNA CVX QL NAA+PROBE: NEGATIVE
HPV16 DNA CVX QL NAA+PROBE: NEGATIVE
HPV18 DNA CVX QL NAA+PROBE: NEGATIVE

## 2022-08-19 LAB
C TRACH DNA SPEC QL NAA+PROBE: NEGATIVE
HGB A MFR BLD: 2.7 % (ref 1.8–3.2)
HGB A MFR BLD: 97.3 % (ref 96.4–98.8)
HGB F MFR BLD: 0 % (ref 0–2)
HGB FRACT BLD-IMP: NORMAL
HGB S MFR BLD: 0 %
N GONORRHOEA DNA SPEC QL NAA+PROBE: NEGATIVE

## 2022-08-23 ENCOUNTER — ROUTINE PRENATAL (OUTPATIENT)
Dept: PERINATAL CARE | Facility: OTHER | Age: 32
End: 2022-08-23
Payer: COMMERCIAL

## 2022-08-23 VITALS
SYSTOLIC BLOOD PRESSURE: 132 MMHG | HEART RATE: 102 BPM | WEIGHT: 177.6 LBS | DIASTOLIC BLOOD PRESSURE: 86 MMHG | BODY MASS INDEX: 29.59 KG/M2 | HEIGHT: 65 IN

## 2022-08-23 DIAGNOSIS — Z3A.13 13 WEEKS GESTATION OF PREGNANCY: ICD-10-CM

## 2022-08-23 DIAGNOSIS — O36.80X0 ENCOUNTER TO DETERMINE FETAL VIABILITY OF PREGNANCY, SINGLE OR UNSPECIFIED FETUS: Primary | ICD-10-CM

## 2022-08-23 DIAGNOSIS — Z36.82 ENCOUNTER FOR NUCHAL TRANSLUCENCY TESTING: ICD-10-CM

## 2022-08-23 PROCEDURE — 99241 PR OFFICE CONSULTATION NEW/ESTAB PATIENT 15 MIN: CPT | Performed by: OBSTETRICS & GYNECOLOGY

## 2022-08-23 PROCEDURE — 76813 OB US NUCHAL MEAS 1 GEST: CPT | Performed by: OBSTETRICS & GYNECOLOGY

## 2022-08-23 PROCEDURE — 36415 COLL VENOUS BLD VENIPUNCTURE: CPT | Performed by: OBSTETRICS & GYNECOLOGY

## 2022-08-23 PROCEDURE — 76801 OB US < 14 WKS SINGLE FETUS: CPT | Performed by: OBSTETRICS & GYNECOLOGY

## 2022-08-23 NOTE — LETTER
September 13, 2022     Cornelius Aase, [address] [facility]   [address] [facility] [address]    Patient: Sobia Cartwright   YOB: 1990   Date of Visit: 8/23/2022       Dear Dr Justin Mckeon: Thank you for referring Sobia Cartwright to me for evaluation  Below are my notes for this consultation  If you have questions, please do not hesitate to call me  I look forward to following your patient along with you  Sincerely,        Kevyn Cavazos MD        CC: No Recipients  Kevyn Cavazos MD  9/13/2022  7:53 AM  Sign when Signing Visit  [address], [place]  [address],  [address]     Thank you for referring your Sobia Cartwright for a Maternal-Fetal Medicine Consultation:  Below is my consultation  Thank you very much for requesting a consultation this very nice patient for the indication of genetic screening  This is the patient's 2nd pregnancy  She has a history of a first-trimester termination of pregnancy  She has no significant medical or surgical history otherwise  She has no known drug allergies  Substance use history is unremarkable and her family history is significant for hypertension in her mother  We discussed the options for genetic screening, including but not limited to first trimester screening, second trimester screening, combined first and second trimester screening, noninvasive prenatal screening (NIPS) for patients at high risk and diagnostic screening through the use of CVS and amniocentesis    We discussed the risks and benefits of each approach including the sensitivities and false positive rates as well as the difference between a screening test and a diagnostic test   At the conclusion of our discussion the patient elected noninvasive prenatal testing utilizing the [facility] Non-invasive prenatal screening (NIPS) test   The patient had this blood work drawn in the office and the results should be available approximately 7-10 days after her blood draw  Her results will be reported from [facility]  Given the patient's history of nulliparity, ethnicity, and family history, I recommend initiating low dose aspirin therapy  A recent meta-analysis yielded risk reductions of 24% for preeclampsia, 20% for intrauterine growth restriction, and 14% for  birth, with an absolute risk reduction of 2-5% for preeclampsia, one to 5% for intrauterine growth restriction, and 2-4% for  birth  In this study, there was no identified risk of harm to the mother or fetus but long-term evidence was somewhat limited  Given the overall safety profile and risk-benefit analysis, I recommend 162 mg of aspirin be taken Daily and discontinued at around 36 weeks gestation or 2-3 weeks prior to planned delivery  I reviewed these recommendations with the patient and answered all of her questions to apparent satisfaction  We discussed follow-up in detail and I recommend an anatomy ultrasound be scheduled for 20 weeks gestation  Thank you very much for allowing us to participate in the care of this very nice patient  Should you have any questions, please do not hesitate to contact our office  Please note, in addition to the time spent discussing the results of the ultrasound, I spent approximately 15 minutes of face-to-face time with the patient, greater than 50% of which was spent in counseling and the coordination of care for this patient  Portions of the record may have been created with voice recognition software  Occasional wrong word or "sound a like" substitutions may have occurred due to the inherent limitations of voice recognition software  Read the chart carefully and recognize, using context, where substitutions have occurred  Jorge Ortiz MD  Attending Physician, [place]

## 2022-08-24 LAB
LAB AP GYN PRIMARY INTERPRETATION: ABNORMAL
Lab: ABNORMAL
PATH INTERP SPEC-IMP: ABNORMAL

## 2022-08-25 NOTE — PROGRESS NOTES
Patient chose to have [facility] Non-invasive Prenatal Screen  Patient given brochure and is aware [facility] will contact patients insurance and coordinate coverage  Patient made aware she will need to respond to text message or e-mail from [employer] within 2 business days or testing will be run through insurance  Patient informed text message will come from area code  "415"  Provided [employer] # [phone] and web site : [email or web address] [email or web address]     2 vials of blood drawn from left arm, patient tolerated blood draw without difficulty  Specimens labeled with patient identifiers (name, date of birth, specimen collection date), order and specimen was verified with patient, packed and sent via [employer]  Copy of lab order scanned to Epic media  Maternal Fetal Medicine will have results in approximately 7-10 business days and will call patient or notify via [address]  Patient aware viewing lab result online will reveal fetal sex If ordered  Patient verbalized understanding of all instructions and no questions at this time

## 2022-08-31 ENCOUNTER — TELEPHONE (OUTPATIENT)
Dept: OBGYN CLINIC | Facility: CLINIC | Age: 32
End: 2022-08-31

## 2022-08-31 NOTE — TELEPHONE ENCOUNTER
Attempted to call patient regarding results  Patient did not respond  Left message for patient to call clinic

## 2022-09-06 DIAGNOSIS — Z34.91 PRENATAL CARE IN FIRST TRIMESTER: ICD-10-CM

## 2022-09-06 DIAGNOSIS — Z3A.11 11 WEEKS GESTATION OF PREGNANCY: ICD-10-CM

## 2022-09-06 RX ORDER — ONDANSETRON 4 MG/1
4 TABLET, ORALLY DISINTEGRATING ORAL EVERY 6 HOURS PRN
Qty: 20 TABLET | Refills: 0 | Status: SHIPPED | OUTPATIENT
Start: 2022-09-06 | End: 2022-10-07 | Stop reason: SDUPTHER

## 2022-09-06 RX ORDER — PYRIDOXINE HCL (VITAMIN B6) 25 MG
25 TABLET ORAL DAILY
Qty: 30 TABLET | Refills: 0 | Status: SHIPPED | OUTPATIENT
Start: 2022-09-06 | End: 2022-10-06

## 2022-09-12 ENCOUNTER — TELEPHONE (OUTPATIENT)
Dept: OBGYN CLINIC | Facility: CLINIC | Age: 32
End: 2022-09-12

## 2022-09-12 NOTE — TELEPHONE ENCOUNTER
Pt called with C/O vaginal discomfort, Pt was advised to make an appointment to be seen  Negative VB, LOF or CTX  Pt verbalized understanding of all discussed

## 2022-09-13 ENCOUNTER — ROUTINE PRENATAL (OUTPATIENT)
Dept: OBGYN CLINIC | Facility: CLINIC | Age: 32
End: 2022-09-13

## 2022-09-13 VITALS
DIASTOLIC BLOOD PRESSURE: 75 MMHG | SYSTOLIC BLOOD PRESSURE: 121 MMHG | WEIGHT: 181 LBS | HEART RATE: 114 BPM | BODY MASS INDEX: 30.12 KG/M2

## 2022-09-13 DIAGNOSIS — Z34.92 PRENATAL CARE IN SECOND TRIMESTER: Primary | ICD-10-CM

## 2022-09-13 DIAGNOSIS — Z3A.17 17 WEEKS GESTATION OF PREGNANCY: ICD-10-CM

## 2022-09-13 DIAGNOSIS — B37.9 YEAST INFECTION: ICD-10-CM

## 2022-09-13 PROBLEM — Z3A.13 13 WEEKS GESTATION OF PREGNANCY: Status: ACTIVE | Noted: 2022-09-13

## 2022-09-13 LAB
BV WHIFF TEST VAG QL: NEGATIVE
CLUE CELLS SPEC QL WET PREP: NEGATIVE
PH SMN: 4.5 [PH]
SL AMB POCT WET MOUNT: ABNORMAL
T VAGINALIS VAG QL WET PREP: NEGATIVE
YEAST VAG QL WET PREP: ABNORMAL

## 2022-09-13 PROCEDURE — T1002 RN SERVICES UP TO 15 MINUTES: HCPCS | Performed by: REGISTERED NURSE

## 2022-09-13 PROCEDURE — 99214 OFFICE O/P EST MOD 30 MIN: CPT | Performed by: NURSE PRACTITIONER

## 2022-09-13 PROCEDURE — 87210 SMEAR WET MOUNT SALINE/INK: CPT | Performed by: NURSE PRACTITIONER

## 2022-09-13 RX ORDER — ASPIRIN 81 MG/1
162 TABLET ORAL DAILY
Qty: 180 TABLET | Refills: 3 | Status: SHIPPED | OUTPATIENT
Start: 2022-09-13

## 2022-09-13 RX ORDER — FLUCONAZOLE 150 MG/1
150 TABLET ORAL ONCE
Qty: 1 TABLET | Refills: 1 | Status: SHIPPED | OUTPATIENT
Start: 2022-09-13 | End: 2022-09-13

## 2022-09-13 NOTE — PROGRESS NOTES
Assessment & Plan  29 y o  Danyelle Flores at 17w0d presenting for routine prenatal visit  Pt presents with C/O vaginal discharge and vulvar itching for several days  Pt has Level II U/S scheduled    Explained wet mount was positive for yeast, safe and effective use of Fluconazole was provided, discussed comfort measures      Problem List Items Addressed This Visit        Other    17 weeks gestation of pregnancy    Prenatal care - Primary      Other Visit Diagnoses     Yeast infection        Relevant Medications    fluconazole (DIFLUCAN) 150 mg tablet    Other Relevant Orders    POCT wet mount (Completed)        ____________________________________________________________  Subjective     She denies contractions, loss of fluid, or vaginal bleeding  She does not feels regular fetal movements    Physical exam  Alert and oriented  Denies pain  Vulva slight erythema, negative lesions  Vagina positive erythema, thick white discharge  Cervix closed, negative lesions, thick white discharge  Wet mount was positive for yeast     Objective  /75   Pulse (!) 114   Wt 82 1 kg (181 lb)   LMP 06/15/2022 (Exact Date)   BMI 30 12 kg/m²          Patient's Active Problem List  Patient Active Problem List   Diagnosis    Prenatal care    Tingling    Nausea    17 weeks gestation of pregnancy     Plan  To call with vaginal bleeding, loss of fluid, regular contractions, other needs or concerns    Take Fluconazole as directed  Wear loose clothes and cotton underwear  Keep appointment for Level II U/S  Return in 4 weeks

## 2022-09-13 NOTE — PROGRESS NOTES
CONSULT NOTE    Reuben Guan MD  [address],  [address] [facility]     Thank you for referring your Sukumar Getting for a Maternal-Fetal Medicine Consultation:  Below is my consultation  Thank you very much for requesting a consultation this very nice patient for the indication of genetic screening  This is the patient's 2nd pregnancy  She has a history of a first-trimester termination of pregnancy  She has no significant medical or surgical history otherwise  She has no known drug allergies  Substance use history is unremarkable and her family history is significant for hypertension in her mother  We discussed the options for genetic screening, including but not limited to first trimester screening, second trimester screening, combined first and second trimester screening, noninvasive prenatal screening (NIPS) for patients at high risk and diagnostic screening through the use of CVS and amniocentesis  We discussed the risks and benefits of each approach including the sensitivities and false positive rates as well as the difference between a screening test and a diagnostic test   At the conclusion of our discussion the patient elected noninvasive prenatal testing utilizing the [facility] Non-invasive prenatal screening (NIPS) test   The patient had this blood work drawn in the office and the results should be available approximately 7-10 days after her blood draw  Her results will be reported from [facility]  Given the patient's history of nulliparity, ethnicity, and family history, I recommend initiating low dose aspirin therapy  A recent meta-analysis yielded risk reductions of 24% for preeclampsia, 20% for intrauterine growth restriction, and 14% for  birth, with an absolute risk reduction of 2-5% for preeclampsia, one to 5% for intrauterine growth restriction, and 2-4% for  birth    In this study, there was no identified risk of harm to the mother or fetus but long-term evidence was somewhat limited  Given the overall safety profile and risk-benefit analysis, I recommend 162 mg of aspirin be taken Daily and discontinued at around 36 weeks gestation or 2-3 weeks prior to planned delivery  I reviewed these recommendations with the patient and answered all of her questions to apparent satisfaction  We discussed follow-up in detail and I recommend an anatomy ultrasound be scheduled for 20 weeks gestation  Thank you very much for allowing us to participate in the care of this very nice patient  Should you have any questions, please do not hesitate to contact our office  Please note, in addition to the time spent discussing the results of the ultrasound, I spent approximately 15 minutes of face-to-face time with the patient, greater than 50% of which was spent in counseling and the coordination of care for this patient  Portions of the record may have been created with voice recognition software  Occasional wrong word or "sound a like" substitutions may have occurred due to the inherent limitations of voice recognition software  Read the chart carefully and recognize, using context, where substitutions have occurred  Jorge Kwong MD  Attending Physician, [facility]

## 2022-09-13 NOTE — PATIENT INSTRUCTIONS
WARNING SIGNS DURING PREGNANCY  Call our office at [phone] for any of the followin  Vaginal bleeding  2  Sharp abdominal pain that does not go away  3  Fever (more than 100 4 and is not relieved by Tylenol)  4  Persistent vomiting lasting greater than 24 hours  5  Chest pain   6  Pain or burning when you urinate  7  Severe headache that doesn't resolve with Tylenol  8  Blurred vision or seeing spots in your vision  9  Sudden swelling of your face or hands  10  Redness, swelling or pain in a leg  11  A sudden weight gain in just a few days  12  Decrease in your baby's movement (after 28 weeks or the 6th month of pregnancy)  13  A loss of watery fluid from your vagina - can be a gush, a trickle or continuous wetness  14  After 20 weeks of pregnancy, rhythmic cramping (greater than 4 per hour) or menstrual like low/pelvic pain Coronavirus (COVID-19) pregnancy FAQs: Medical experts answer your questions  From [employer] cy  [email or web address]-[email or web address]-[email or web address]-[email or web address]-fr_[id number]  bc (courtesy of [facility])  As of 3/18/20  By Elisa Lopez 41  Medically reviewed by Society for Maternal-Fetal Medicine       We asked parents-to-be to send us their most pressing questions about coronavirus (COVID-19)  Among them: Is it still safe to deliver in a hospital? What if my ob-gyn has the virus? We sent those questions to the top docs at the Society for Maternal-Fetal Medicine  Here are their answers  The coronavirus (COVID-19) pandemic has been declared a national emergency in the [facility] by [employer]  Moms-to-be like you are concerned about everything from getting medicines to managing disruptions at work  But above and beyond any worry about lifestyle changes is a focus on your health and the impact of COVID-19 on your pregnancy  We asked obstetrics doctors who handle the most complicated pregnancy issues to answer your questions about the coronavirus  Here are their responses, provided by Dr Jorge Luis Honeycutt and her colleagues at the [employer] for [employer]  Am I at more risk for COVID-19 if I'm pregnant? We don't know  Pregnancy does change your immune system in ways that might make you more susceptible to viral respiratory infections like COVID-19  If you become infected, you might also be at higher risk for more severe illness compared to the general population  Although this does not appear to be the case with COVID-19, based on limited data so far, a higher risk has been true for pregnant women with other coronavirus infections (SARS-CoV and MERS-CoV) and other viral respiratory infections, such as flu  It's important to take preventive actions to avoid infection, such as washing your hands often and avoiding people who are sick  How might coronavirus affect my pregnancy? Again, we don't know  Women with other coronavirus infections (such as SARS-CoV) did not have miscarriage or stillbirth at higher rates than the general population  We know that having other respiratory viral infections during pregnancy, such as flu, has been associated with problems like low birth weight and  birth  Also, having a high fever early in pregnancy may increase the risk of certain birth defects  Could I transmit coronavirus to my baby during pregnancy or delivery? We don't know whether you could transmit COVID-19 to your baby before or during delivery  However, among the few case studies of infants born to mothers with COVID-23 published in peer-reviewed literature, none of the infants tested positive for the virus  Also, there was no virus detected in samples of amniotic fluid or breast milk  There have been a few reports of newborns as young as a few days old with infection, suggesting that a mother can transmit the infection to her infant through close contact after delivery    There have been no reports of mother-to-baby transmission for other coronaviruses (MERS-CoV and SARS-CoV), although only limited information is available  Is it safe for me to deliver at a hospital where there have been COVID-19 cases? Yes  We know that COVID-19 is a very scary virus  The good news is that hospitals are taking great precautions to keep patients and healthcare providers safe  When a patient is even suspected to have COVID-19, they are placed in a negative pressure room  (Think of these rooms as vacuums that suck and filter the air so it's safe for the other people in the hospital)  This makes it possible for you to deliver at the hospital without putting you or your baby at risk  Hospitals are also implementing stricter visiting policies to keep patients safe  It's worth calling your hospital to check if there are any new regulations to be aware of  What plans should I make now in case the hospital system is overwhelmed when it's time for me to deliver? This is a great question to talk with your doctor or midwife about when you're 34 to 36 weeks pregnant  Every hospital is making different plans for dealing with this scenario  I work in healthcare  Should I ask my doctor to excuse me from work until the baby is born? What if I work in a school, the travel [address], or some other high-risk setting? Healthcare facilities should take care to limit the exposure of pregnant employees to patients with confirmed or suspected COVID-19, just as they would with other infectious cases  If you continue working, be sure to follow the CDC's risk assessment and infection control guidelines  If you work in a school, [employer], or other high-risk setting, talk with your employer about what it's doing to protect employees and minimize infection risks  Wash your hands often  What if my OB gets COVID-19?   If your doctor or midwife tests positive for COVID-19, they will need to be quarantined until they recover and are no longer at risk of transmitting the virus  In this case, you'll be assigned to another OB in your doctor's practice (or you may choose another practitioner yourself)  Ask your new OB or your doctor's office if you should self-quarantine or be tested for the virus  (It will depend on when you last saw your provider and when that person tested positive )    Should we hold off on trying to conceive because of COVID-19? At this time, there's no reason to hold off on trying to get pregnant, but the data we have is really limited  For example, we don't think the virus causes birth defects or increases your risk of miscarriage  But we don't know whether you could transmit COVID-19 to your baby before or during delivery  We also don't know if the virus lives in semen or can be sexually transmitted  We have a babymoon scheduled in the next few months - should we cancel? If you're planning to travel internationally or on a cruise, you should strongly consider canceling  At this time, the virus has reached more than 140 countries, and there are travel bans to [place], most of [place], and [place]  Places where large numbers of people gather are at highest risk, especially airports and cruise ships  If you're planning travel in the [place] , note that any travel setting increases your risk of exposure, and there may be travel bans even in [place] by the time you're scheduled to go  Even if you're state is not blocked, you'll definitely want to avoid traveling to communities where the virus is spreading  To find out where these places are, check The [employer] [place] Times map based on CDC data  For the most current advice to help you avoid exposure, check the CDC's COVID-19 travel page  Will the hospital separate me from my  and keep the baby in quarantine?   If you test positive for COVID-19 or have been exposed but have no symptoms, the CDC, [employer] of Obstetricians and Gynecologists, and the Society for [employer] all recommend that you be  from your baby to decrease the risk of transmission to the baby  This would last until you are no longer at risk of transmitting the virus  If you do not have COVID-19 and have not been exposed to the virus, the hospital will not separate you from your   My hospital is restricting visitors and only allowing one support person  If my support person leaves after the delivery, will they be allowed to come back? Every hospital has different policies  Contact your hospital or labor and delivery unit a week or so before delivery to get the most up-to-date restrictions  In general, if your support person needs to leave, they would be allowed back unless they knew they were exposed to COVID-19 after leaving your company  [facility] understands that the coronavirus (COVID-19) pandemic is an evolving story and that your questions will change over time  We'll continue asking moms and dads in our Community what they want to know, and we'll get the answers from experts to keep them - and you - informed and supported  My mom was planning to fly here to help me care for my new baby after delivery  Should I tell her not to come? Yes  If your mom is over 61 or has any serious chronic medical conditions (such as heart disease, lung disease, or diabetes), she is at higher risk of serious illness from COVID-19 and should avoid air travel  And remember that any travel setting increases a person's risk of exposure  So, it may be risky to have her around the baby after she has been traveling  For the most current advice on traveling, check the CDC's COVID-19 travel page  [facility] understands that the coronavirus pandemic is an evolving story and that your questions will change over time  We'll continue asking moms and dads in our Community what they want to know, and we'll get the answers from experts to keep them - and you - informed and supported       Take Fluconazole as directed  Wear loose clothes and cotton underwear  Keep appointment for Level II U/S  Return in 4 weeks

## 2022-09-14 DIAGNOSIS — Z13.79 GENETIC SCREENING: Primary | ICD-10-CM

## 2022-09-16 ENCOUNTER — TELEPHONE (OUTPATIENT)
Dept: OBGYN CLINIC | Facility: CLINIC | Age: 32
End: 2022-09-16

## 2022-09-16 NOTE — TELEPHONE ENCOUNTER
----- Message from [place] sent at 9/14/2022  2:18 PM EDT -----  [facility] has AFP ordered today, best to be done by 19 weeks but cn be done until 20 6/7, this is second part of genetic screening, please notify Gio Toledo of order

## 2022-09-19 ENCOUNTER — APPOINTMENT (OUTPATIENT)
Dept: LAB | Facility: CLINIC | Age: 32
End: 2022-09-19
Payer: COMMERCIAL

## 2022-09-19 DIAGNOSIS — Z13.79 GENETIC SCREENING: ICD-10-CM

## 2022-09-19 PROCEDURE — 36415 COLL VENOUS BLD VENIPUNCTURE: CPT

## 2022-09-19 PROCEDURE — 82105 ALPHA-FETOPROTEIN SERUM: CPT

## 2022-09-21 LAB
2ND TRIMESTER 4 SCREEN SERPL-IMP: NORMAL
AFP ADJ MOM SERPL: 0.73
AFP INTERP AMN-IMP: NORMAL
AFP INTERP SERPL-IMP: NORMAL
AFP INTERP SERPL-IMP: NORMAL
AFP SERPL-MCNC: 68.7 NG/ML
AGE AT DELIVERY: 30.8 YR
GA METHOD: NORMAL
GA: 23.7 WEEKS
IDDM PATIENT QL: NO
MULTIPLE PREGNANCY: NO
NEURAL TUBE DEFECT RISK FETUS: NORMAL %

## 2022-09-22 DIAGNOSIS — Z34.91 PRENATAL CARE IN FIRST TRIMESTER: ICD-10-CM

## 2022-09-22 RX ORDER — ONDANSETRON 4 MG/1
4 TABLET, ORALLY DISINTEGRATING ORAL EVERY 6 HOURS PRN
Qty: 20 TABLET | Refills: 0 | OUTPATIENT
Start: 2022-09-22

## 2022-09-23 ENCOUNTER — HOME HEALTH ADMISSION (OUTPATIENT)
Dept: HOME HEALTH SERVICES | Facility: OTHER | Age: 32
End: 2022-09-23

## 2022-09-23 ENCOUNTER — CONSULT (OUTPATIENT)
Dept: NEUROLOGY | Facility: CLINIC | Age: 32
End: 2022-09-23
Payer: COMMERCIAL

## 2022-09-23 VITALS
HEIGHT: 65 IN | DIASTOLIC BLOOD PRESSURE: 65 MMHG | HEART RATE: 95 BPM | TEMPERATURE: 95.8 F | WEIGHT: 183 LBS | BODY MASS INDEX: 30.49 KG/M2 | SYSTOLIC BLOOD PRESSURE: 109 MMHG

## 2022-09-23 DIAGNOSIS — R29.898 LEFT ARM WEAKNESS: ICD-10-CM

## 2022-09-23 DIAGNOSIS — G56.22 ULNAR NEUROPATHY OF LEFT UPPER EXTREMITY: Primary | ICD-10-CM

## 2022-09-23 DIAGNOSIS — R20.0 ARM NUMBNESS LEFT: ICD-10-CM

## 2022-09-23 PROCEDURE — 99245 OFF/OP CONSLTJ NEW/EST HI 55: CPT | Performed by: PSYCHIATRY & NEUROLOGY
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
## 2022-10-03 NOTE — PATIENT INSTRUCTIONS
Thank you for choosing us for your  care today  If you have any questions about your ultrasound or care, please do not hesitate to contact us or your primary obstetrician  Some general instructions for your pregnancy are:    Protect against coronavirus: get vaccinated - pregnant women are increased risk of severe COVID  Notify your primary care doctor if you have any symptoms  Exercise: Aim for 22 minutes per day (150 minutes per week) of regular exercise  Walking is great! Nutrition: aim for calcium-rich and iron-rich foods as well as healthy sources of protein  Learn about Preeclampsia: preeclampsia is a common, serious high blood pressure complication in pregnancy  A blood pressure of 840SMSX (systolic or top number) or 33PQMG (diastolic or bottom number) is not normal and needs evaluation by your doctor  Aspirin is sometimes prescribed in early pregnancy to prevent preeclampsia in women with risk factors - ask your obstetrician if you should be on this medication  If you smoke, try to reduce how many cigarettes you smoke or try to quit completely  Do not vape  Other warning signs to watch out for in pregnancy or postpartum: chest pain, obstructed breathing or shortness of breath, seizures, thoughts of hurting yourself or your baby, bleeding, a painful or swollen leg, fever, or headache (see [facility] POST-BIRTH Warning Signs campaign)  If these happen call 911  Itching is also not normal in pregnancy and if you experience this, especially over your hands and feet, potentially worse at night, notify your doctors

## 2022-10-04 ENCOUNTER — ROUTINE PRENATAL (OUTPATIENT)
Dept: PERINATAL CARE | Facility: OTHER | Age: 32
End: 2022-10-04
Payer: COMMERCIAL

## 2022-10-04 VITALS
WEIGHT: 183.8 LBS | HEIGHT: 65 IN | HEART RATE: 90 BPM | BODY MASS INDEX: 30.62 KG/M2 | DIASTOLIC BLOOD PRESSURE: 84 MMHG | SYSTOLIC BLOOD PRESSURE: 124 MMHG

## 2022-10-04 DIAGNOSIS — Z36.3 ENCOUNTER FOR ANTENATAL SCREENING FOR MALFORMATIONS: Primary | ICD-10-CM

## 2022-10-04 DIAGNOSIS — Z36.86 ENCOUNTER FOR ANTENATAL SCREENING FOR CERVICAL LENGTH: ICD-10-CM

## 2022-10-04 DIAGNOSIS — Z3A.19 19 WEEKS GESTATION OF PREGNANCY: ICD-10-CM

## 2022-10-04 PROCEDURE — 76805 OB US >/= 14 WKS SNGL FETUS: CPT | Performed by: OBSTETRICS & GYNECOLOGY

## 2022-10-04 PROCEDURE — 76817 TRANSVAGINAL US OBSTETRIC: CPT | Performed by: OBSTETRICS & GYNECOLOGY

## 2022-10-04 PROCEDURE — 99213 OFFICE O/P EST LOW 20 MIN: CPT | Performed by: OBSTETRICS & GYNECOLOGY

## 2022-10-04 NOTE — PROGRESS NOTES
[address]: Ms Jacky Hunt was seen today for anatomic survey and cervical length screening ultrasound  See ultrasound report under "OB Procedures" tab  We discussed her low risk NIPS and negative MSAFP screen  All questions are answered  Please don't hesitate to contact our office with any concerns or questions   -Zo Olmos      I spent 10 minutes devoted to patient care (3 min chart preparation, 4 minutes face to face and 3 minutes documenting)

## 2022-10-04 NOTE — LETTER
2022    [address], [address] [facility] [address]    Patient: Genesis Frey   YOB: 1990   Date of Visit: 10/4/2022   Gestational age 24w0d   Anali Guillen of this communication: Routine       Dear Ms  Alejandro,    This patient was seen recently in our  office  The content of my evaluation today is in the ultrasound report under "OB Procedures" tab  Please don't hesitate to contact our office with any concerns or questions       Sincerely,      Sandra Retana MD/Nicole [address] [place] [address]  Attending Physician, Ofelia

## 2022-10-04 NOTE — PROGRESS NOTES
Ultrasound Probe Disinfection    A transvaginal ultrasound was performed  Prior to use, disinfection was performed with High Level Disinfection Process (Trophon)  Probe serial number F3: [id number] was used        Eva Arn  10/04/22  10:31 AM

## 2022-10-06 PROCEDURE — T1002 RN SERVICES UP TO 15 MINUTES: HCPCS | Performed by: REGISTERED NURSE

## 2022-10-07 DIAGNOSIS — Z34.91 PRENATAL CARE IN FIRST TRIMESTER: ICD-10-CM

## 2022-10-07 RX ORDER — ONDANSETRON 4 MG/1
4 TABLET, ORALLY DISINTEGRATING ORAL EVERY 6 HOURS PRN
Qty: 20 TABLET | Refills: 3 | Status: SHIPPED | OUTPATIENT
Start: 2022-10-07 | End: 2023-01-10 | Stop reason: SDUPTHER

## 2022-10-11 ENCOUNTER — HOSPITAL ENCOUNTER (OUTPATIENT)
Dept: MRI IMAGING | Facility: HOSPITAL | Age: 32
Discharge: HOME | End: 2022-10-11
Attending: PSYCHIATRY & NEUROLOGY
Payer: COMMERCIAL

## 2022-10-11 ENCOUNTER — ROUTINE PRENATAL (OUTPATIENT)
Dept: OBGYN CLINIC | Facility: CLINIC | Age: 32
End: 2022-10-11

## 2022-10-11 ENCOUNTER — APPOINTMENT (OUTPATIENT)
Dept: LAB | Facility: CLINIC | Age: 32
End: 2022-10-11
Payer: COMMERCIAL

## 2022-10-11 VITALS
SYSTOLIC BLOOD PRESSURE: 125 MMHG | HEART RATE: 85 BPM | DIASTOLIC BLOOD PRESSURE: 86 MMHG | WEIGHT: 183 LBS | BODY MASS INDEX: 30.45 KG/M2

## 2022-10-11 DIAGNOSIS — R87.610 ASCUS OF CERVIX WITH NEGATIVE HIGH RISK HPV: ICD-10-CM

## 2022-10-11 DIAGNOSIS — R20.0 ARM NUMBNESS LEFT: ICD-10-CM

## 2022-10-11 DIAGNOSIS — Z34.91 PRENATAL CARE IN FIRST TRIMESTER: ICD-10-CM

## 2022-10-11 DIAGNOSIS — Z13.79 GENETIC SCREENING: ICD-10-CM

## 2022-10-11 DIAGNOSIS — Z3A.21 21 WEEKS GESTATION OF PREGNANCY: ICD-10-CM

## 2022-10-11 DIAGNOSIS — R35.0 URINARY FREQUENCY: ICD-10-CM

## 2022-10-11 DIAGNOSIS — G56.22 ULNAR NEUROPATHY OF LEFT UPPER EXTREMITY: ICD-10-CM

## 2022-10-11 DIAGNOSIS — Z34.92 PRENATAL CARE IN SECOND TRIMESTER: Primary | ICD-10-CM

## 2022-10-11 LAB
SL AMB  POCT GLUCOSE, UA: NORMAL
SL AMB LEUKOCYTE ESTERASE,UA: NORMAL
SL AMB POCT BILIRUBIN,UA: NORMAL
SL AMB POCT BLOOD,UA: NORMAL
SL AMB POCT CLARITY,UA: CLEAR
SL AMB POCT COLOR,UA: YELLOW
SL AMB POCT KETONES,UA: NORMAL
SL AMB POCT NITRITE,UA: NORMAL
SL AMB POCT PH,UA: 5
SL AMB POCT SPECIFIC GRAVITY,UA: 1.01
SL AMB POCT URINE PROTEIN: 30
SL AMB POCT UROBILINOGEN: 0.2

## 2022-10-11 PROCEDURE — 81329 SMN1 GENE DOS/DELETION ALYS: CPT

## 2022-10-11 PROCEDURE — 81220 CFTR GENE COM VARIANTS: CPT

## 2022-10-11 PROCEDURE — 81243 FMR1 GEN ALY DETC ABNL ALLEL: CPT

## 2022-10-11 PROCEDURE — G1004 CDSM NDSC: HCPCS

## 2022-10-11 PROCEDURE — 72141 MRI NECK SPINE W/O DYE: CPT

## 2022-10-11 PROCEDURE — 81002 URINALYSIS NONAUTO W/O SCOPE: CPT | Performed by: OBSTETRICS & GYNECOLOGY

## 2022-10-11 PROCEDURE — 87086 URINE CULTURE/COLONY COUNT: CPT | Performed by: OBSTETRICS & GYNECOLOGY

## 2022-10-11 PROCEDURE — 36415 COLL VENOUS BLD VENIPUNCTURE: CPT

## 2022-10-11 PROCEDURE — 99213 OFFICE O/P EST LOW 20 MIN: CPT | Performed by: OBSTETRICS & GYNECOLOGY

## 2022-10-11 NOTE — PROGRESS NOTES
[address] VISIT  Name: Anna Montelongo  MRN: [id number]  : 1990      ASSESSMENT/PLAN:  Problem List        Nervous and Auditory    Left arm weakness    Ulnar neuropathy of left upper extremity       Other    21 weeks gestation of pregnancy    Arm numbness left    ASCUS of cervix with negative high risk HPV    Overview     ACUS, HPV neg on 22, will need repeat Pap and HPV in 3y         Genetic screening    Overview     AFP ordered 2022, WNL         Nausea    Overview     B6 and Unisom not alleviating nausea  Zofran helping         Prenatal care in second trimester    Overview     Prenatal labs overall WNL, hgb electrophoresis WNL, Prenatal carrier screen not yet collected  MFM Level 2 WNL, kiersten follow-up for missed anatomy @28w  PNV in 4 weeks             Tingling    Overview     Tingling in left fourth and fifth digits that started last week  No pain or numbness  No tinging or pain in her neck or left arm --> resolved today 10/11/22  Pt to complete MRI 10/11/22           Other Visit Diagnoses     Urinary frequency              SUBJECTIVE 29 y o  Andi Ratliff @ 21w0d here for PN visit  She denies contractions  She denies leakage of fluid and vaginal bleeding  She endorses the starts of fetal movement  Today she describes a sensation that she has to urinate frequently, but when she sits to use the bathroom she only passes a small amount of urine  No dysuria, no hematuria  Encouraged increased PO hydration, will collect clean catch POCT urine today, as well as urine culture        OBJECTIVE:  Vitals:    10/11/22 1002   BP: 125/86   Pulse: 85       Fundal height: 21cm  FHT: 144       Future Appointments   Date Time Provider Gerard Hayden   10/11/2022  2:45 PM AL MRI 1 AL MRI [facility]   10/26/2022  8:30 AM [address] [facility] [address] DENT [address]   2022 11:30 AM Karla Verdugo DDS [address] DENT [address]   2022  9:30 AM Jeff Chapman RDH [address] DENT [address]   2022 11:30 AM  [facility]  OB/GYN PGY-3  10/11/2022  10:07 AM

## 2022-10-12 LAB — BACTERIA UR CULT: NORMAL

## 2022-10-18 LAB
CLINICAL INFO: NORMAL
ETHNIC BACKGROUND STATED: NORMAL
GENE MUT TESTED BLD/T: NORMAL
GENERAL COMMENTS:: NORMAL
LAB DIRECTOR NAME PROVIDER: NORMAL
REASON FOR REFERRAL (NARRATIVE): NORMAL
REF LAB TEST METHOD: NORMAL
SL AMB DISCLAIMER: NORMAL
SL AMB GENETIC COUNSELOR: NORMAL
SMN1 GENE MUT ANL BLD/T: NORMAL
SPECIMEN SOURCE: NORMAL

## 2022-10-19 PROCEDURE — T1002 RN SERVICES UP TO 15 MINUTES: HCPCS | Performed by: REGISTERED NURSE

## 2022-10-20 LAB
CF COMMENT: NORMAL
CFTR MUT ANL BLD/T: NORMAL
COMMENTX: (FRAGILE X): NORMAL
FMR1 GENE CGG RPT BLD/T QL: NORMAL

## 2022-10-21 ENCOUNTER — TELEPHONE (OUTPATIENT)
Dept: OBGYN CLINIC | Facility: CLINIC | Age: 32
End: 2022-10-21

## 2022-10-21 NOTE — TELEPHONE ENCOUNTER
----- Message from Elo Sal MD sent at 10/20/2022  3:25 PM EDT -----  Please let the patient know her urine test and urine culture were negative for a urinary tract infection  If she is still feeling uncomfortable with urination, please ask her to re-present or discuss at her next appointment

## 2022-10-26 ENCOUNTER — OFFICE VISIT (OUTPATIENT)
Dept: DENTISTRY | Facility: CLINIC | Age: 32
End: 2022-10-26

## 2022-10-26 VITALS — DIASTOLIC BLOOD PRESSURE: 81 MMHG | TEMPERATURE: 97.3 F | SYSTOLIC BLOOD PRESSURE: 117 MMHG | HEART RATE: 93 BPM

## 2022-10-26 DIAGNOSIS — Z01.20 ENCOUNTER FOR DENTAL EXAMINATION: Primary | ICD-10-CM

## 2022-10-26 NOTE — PROGRESS NOTES
Mima Salinas came for restorative tr #19, Med hx rvd: pt is in second trimester  ASA II  Pain level: none    PA taken #19  Pt states #19 was RCT retreated at [facility] , Core build up was done  PA looks wnl, parl reduced significantly    Pt  Now ready to proceed with [place] #19  Pt given option of waiting for preauth #19 crown (second attempt, denied first time)   OR make payments (quoted 569, SFS)    Pt wants to wait for preauth and if denied, will make payment towards the crown    Checked #20(D), incipient lesion, clin, will check again at the time of prep,#19   Pt also adv to return with Med cl, form from her OBGYN      nv: Prep and temp #19, check #20(D)

## 2022-10-30 ENCOUNTER — HOSPITAL ENCOUNTER (EMERGENCY)
Facility: HOSPITAL | Age: 32
Discharge: HOME | End: 2022-10-30
Attending: EMERGENCY MEDICINE

## 2022-10-30 VITALS
TEMPERATURE: 97.7 F | RESPIRATION RATE: 18 BRPM | HEART RATE: 92 BPM | DIASTOLIC BLOOD PRESSURE: 91 MMHG | OXYGEN SATURATION: 100 % | SYSTOLIC BLOOD PRESSURE: 145 MMHG
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
## 2022-10-30 DIAGNOSIS — Z3A.23 23 WEEKS GESTATION OF PREGNANCY: ICD-10-CM

## 2022-10-30 RX ORDER — ACETAMINOPHEN 325 MG/1
650 TABLET ORAL ONCE
Status: COMPLETED | OUTPATIENT
Start: 2022-10-30 | End: 2022-10-30

## 2022-10-30 RX ADMIN — ACETAMINOPHEN 650 MG: 325 TABLET ORAL at 13:38

## 2022-10-30 NOTE — Clinical Note
Tracierg Claudio was seen and treated in our emergency department on 10/30/2022  Diagnosis:     Shelby    She may return on this date: 10/31/2022         If you have any questions or concerns, please don't hesitate to call        Maru Rudd PA-C    ______________________________           _______________          _______________  Hospital Representative                              Date                                Time
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
## 2022-10-30 NOTE — DISCHARGE INSTRUCTIONS
Please refer to the attached information for strict return instructions  If symptoms worsen or new symptoms develop please return to the ER  Please follow up with your ob/gyn for re-evaluation  Return if you have new/worsening pain, shortness of breath, chest pain, vaginal bleeding, abdominal/pelvic pain, decreased fetal movement or new/worsening symptoms of concern

## 2022-11-02 ENCOUNTER — OFFICE VISIT (OUTPATIENT)
Dept: DENTISTRY | Facility: CLINIC | Age: 32
End: 2022-11-02

## 2022-11-02 VITALS — TEMPERATURE: 96.9 F | SYSTOLIC BLOOD PRESSURE: 114 MMHG | DIASTOLIC BLOOD PRESSURE: 79 MMHG | HEART RATE: 105 BPM

## 2022-11-02 DIAGNOSIS — Z78.9 FULL COVERAGE CROWN NEEDED FOR ROOT CANAL-TREATED TOOTH: Primary | ICD-10-CM

## 2022-11-02 NOTE — PROGRESS NOTES
Marcos Mervin came for restor tr today  Med hx rvd: Pt is in second trimester  No allergies noted, pt takes prenatal vit, not B6, pt takes baby asprin twice a day (preeclampsia)  Pain level: none  ASA II  Tr done:  Informed pt  That plunger cusp on opposing tooth #14 would need to be adjusted/polished as there is lack of interocclusal space, pt  Shown clinically and agreeable to adjustment  Gave 1/2 carp infiltrn with 3% carboaine in reln to #19  Prep, gingival bleeding in the area, placed  expasyl for 1 min, with comprecap,  For ging retraction, final impression taken with Heavy and light body PVS material   Placed temp crown with provisa temp  Cement, adjusted bite, removed excess cement from margins, contacts, pt  comf with bite, fit, poi given, adv warm saline rinse  Pt adv bruxzir crown due to poor interocclusal clearance  Shade A1  Also inf pt   #20(D) is incipient lesion, keep a watch for now  Pt to return for prophy at nv followed by crown insert   nv: insert Bruxzir #19

## 2022-11-08 ENCOUNTER — APPOINTMENT (OUTPATIENT)
Dept: LAB | Facility: CLINIC | Age: 32
End: 2022-11-08

## 2022-11-08 ENCOUNTER — ROUTINE PRENATAL (OUTPATIENT)
Dept: OBGYN CLINIC | Facility: CLINIC | Age: 32
End: 2022-11-08

## 2022-11-08 ENCOUNTER — PATIENT OUTREACH (OUTPATIENT)
Dept: OBGYN CLINIC | Facility: CLINIC | Age: 32
End: 2022-11-08

## 2022-11-08 VITALS
SYSTOLIC BLOOD PRESSURE: 124 MMHG | BODY MASS INDEX: 31.45 KG/M2 | WEIGHT: 189 LBS | DIASTOLIC BLOOD PRESSURE: 85 MMHG | HEART RATE: 101 BPM

## 2022-11-08 DIAGNOSIS — Z34.92 PRENATAL CARE IN SECOND TRIMESTER: ICD-10-CM

## 2022-11-08 DIAGNOSIS — Z3A.25 25 WEEKS GESTATION OF PREGNANCY: ICD-10-CM

## 2022-11-08 DIAGNOSIS — Z13.79 GENETIC SCREENING: ICD-10-CM

## 2022-11-08 DIAGNOSIS — T74.91XS DOMESTIC VIOLENCE OF ADULT, SEQUELA: Primary | ICD-10-CM

## 2022-11-08 DIAGNOSIS — Z3A.28 28 WEEKS GESTATION OF PREGNANCY: ICD-10-CM

## 2022-11-08 DIAGNOSIS — R87.610 ASCUS OF CERVIX WITH NEGATIVE HIGH RISK HPV: ICD-10-CM

## 2022-11-08 PROBLEM — T74.91XA DOMESTIC VIOLENCE OF ADULT: Status: ACTIVE | Noted: 2022-11-08

## 2022-11-08 LAB
BASOPHILS # BLD AUTO: 0.04 THOUSANDS/ÂΜL (ref 0–0.1)
BASOPHILS NFR BLD AUTO: 0 % (ref 0–1)
EOSINOPHIL # BLD AUTO: 0.09 THOUSAND/ÂΜL (ref 0–0.61)
EOSINOPHIL NFR BLD AUTO: 1 % (ref 0–6)
ERYTHROCYTE [DISTWIDTH] IN BLOOD BY AUTOMATED COUNT: 12.1 % (ref 11.6–15.1)
GLUCOSE 1H P 50 G GLC PO SERPL-MCNC: 95 MG/DL (ref 40–134)
HCT VFR BLD AUTO: 36.6 % (ref 34.8–46.1)
HGB BLD-MCNC: 11.1 G/DL (ref 11.5–15.4)
IMM GRANULOCYTES # BLD AUTO: 0.13 THOUSAND/UL (ref 0–0.2)
IMM GRANULOCYTES NFR BLD AUTO: 1 % (ref 0–2)
LYMPHOCYTES # BLD AUTO: 2.1 THOUSANDS/ÂΜL (ref 0.6–4.47)
LYMPHOCYTES NFR BLD AUTO: 14 % (ref 14–44)
MCH RBC QN AUTO: 27.8 PG (ref 26.8–34.3)
MCHC RBC AUTO-ENTMCNC: 30.3 G/DL (ref 31.4–37.4)
MCV RBC AUTO: 92 FL (ref 82–98)
MONOCYTES # BLD AUTO: 1.03 THOUSAND/ÂΜL (ref 0.17–1.22)
MONOCYTES NFR BLD AUTO: 7 % (ref 4–12)
NEUTROPHILS # BLD AUTO: 11.69 THOUSANDS/ÂΜL (ref 1.85–7.62)
NEUTS SEG NFR BLD AUTO: 77 % (ref 43–75)
NRBC BLD AUTO-RTO: 0 /100 WBCS
PLATELET # BLD AUTO: 333 THOUSANDS/UL (ref 149–390)
PMV BLD AUTO: 11.2 FL (ref 8.9–12.7)
RBC # BLD AUTO: 4 MILLION/UL (ref 3.81–5.12)
WBC # BLD AUTO: 15.08 THOUSAND/UL (ref 4.31–10.16)
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
## 2022-11-09 LAB — RPR SER QL: NORMAL

## 2022-11-16 ENCOUNTER — PATIENT OUTREACH (OUTPATIENT)
Dept: OBGYN CLINIC | Facility: CLINIC | Age: 32
End: 2022-11-16

## 2022-11-16 NOTE — PROGRESS NOTES
EDISON NYE asked  HR about short term disability policy, new carrier ([facility]) starting in January 2023 as opposed to the old [employer] carrier, they consider pregnancy a pre-existing condition and will not cover for new enrollment  EDISON Nye attempted to call Ko fuller to let her know I reached out to HR and did get the same answer as she did, phone not accepting calls at this time

## 2022-11-17 ENCOUNTER — TELEPHONE (OUTPATIENT)
Dept: OBGYN CLINIC | Facility: CLINIC | Age: 32
End: 2022-11-17

## 2022-11-17 NOTE — TELEPHONE ENCOUNTER
----- Message from Julia Ibarra MD sent at 11/14/2022  1:13 PM EST -----  Please call the patient to let her know her one-hour glucola screen is negative  Her CBC was overall normal and her syphilis was negative  Please ask her to collect her anemia panel with reflex in 2 weeks

## 2022-11-17 NOTE — TELEPHONE ENCOUNTER
----- Message from Ida Sheehan MD sent at 11/14/2022  1:13 PM EST -----  Please call the patient to let her know her one-hour glucola screen is negative  Her CBC was overall normal and her syphilis was negative  Please ask her to collect her anemia panel with reflex in 2 weeks

## 2022-11-23 ENCOUNTER — APPOINTMENT (OUTPATIENT)
Dept: LAB | Facility: CLINIC | Age: 32
End: 2022-11-23

## 2022-11-23 ENCOUNTER — OFFICE VISIT (OUTPATIENT)
Dept: DENTISTRY | Facility: CLINIC | Age: 32
End: 2022-11-23

## 2022-11-23 VITALS — HEART RATE: 101 BPM | DIASTOLIC BLOOD PRESSURE: 74 MMHG | SYSTOLIC BLOOD PRESSURE: 107 MMHG | TEMPERATURE: 97.1 F

## 2022-11-23 DIAGNOSIS — Z01.21 ENCOUNTER FOR DENTAL EXAMINATION AND CLEANING WITH ABNORMAL FINDINGS: Primary | ICD-10-CM

## 2022-11-23 DIAGNOSIS — Z3A.28 28 WEEKS GESTATION OF PREGNANCY: ICD-10-CM

## 2022-11-23 LAB
BASOPHILS # BLD AUTO: 0.04 THOUSANDS/ÂΜL (ref 0–0.1)
BASOPHILS NFR BLD AUTO: 0 % (ref 0–1)
EOSINOPHIL # BLD AUTO: 0.11 THOUSAND/ÂΜL (ref 0–0.61)
EOSINOPHIL NFR BLD AUTO: 1 % (ref 0–6)
ERYTHROCYTE [DISTWIDTH] IN BLOOD BY AUTOMATED COUNT: 12.4 % (ref 11.6–15.1)
HCT VFR BLD AUTO: 36.6 % (ref 34.8–46.1)
HGB BLD-MCNC: 11.3 G/DL (ref 11.5–15.4)
IMM GRANULOCYTES # BLD AUTO: 0.11 THOUSAND/UL (ref 0–0.2)
IMM GRANULOCYTES NFR BLD AUTO: 1 % (ref 0–2)
LYMPHOCYTES # BLD AUTO: 2.04 THOUSANDS/ÂΜL (ref 0.6–4.47)
LYMPHOCYTES NFR BLD AUTO: 14 % (ref 14–44)
MCH RBC QN AUTO: 28.6 PG (ref 26.8–34.3)
MCHC RBC AUTO-ENTMCNC: 30.9 G/DL (ref 31.4–37.4)
MCV RBC AUTO: 93 FL (ref 82–98)
MONOCYTES # BLD AUTO: 0.94 THOUSAND/ÂΜL (ref 0.17–1.22)
MONOCYTES NFR BLD AUTO: 7 % (ref 4–12)
NEUTROPHILS # BLD AUTO: 11.08 THOUSANDS/ÂΜL (ref 1.85–7.62)
NEUTS SEG NFR BLD AUTO: 77 % (ref 43–75)
NRBC BLD AUTO-RTO: 0 /100 WBCS
PLATELET # BLD AUTO: 319 THOUSANDS/UL (ref 149–390)
PMV BLD AUTO: 11.1 FL (ref 8.9–12.7)
RBC # BLD AUTO: 3.95 MILLION/UL (ref 3.81–5.12)
WBC # BLD AUTO: 14.32 THOUSAND/UL (ref 4.31–10.16)

## 2022-11-23 NOTE — PROGRESS NOTES
Dental procedures in this visit   •  - PERIODIC ORAL EVALUATION - ESTABLISHED PATIENT (Completed)     Service provider: Jonathan Mensah DDS     Billing provider: Le Brown DDS   •  - PROPHYLAXIS - ADULT (Completed)     Service provider: pJ Yoon [facility]     Billing provider: Le Brown DDS   •  - ORAL HYGIENE INSTRUCTIONS (Completed)     Service provider: Jp Yoon [facility]     Billing provider: Le Brown DDS     Subjective   Patient ID: Bettye Harada is a 29 y o  female  Chief Complaint   Patient presents with   • Periodic Exam   • Routine Oral Cleaning     Adult Prophy    ASA I  Pain:  0  Reviewed M/DH;   Pt is 6 mos pregnant - due 2/22/23     Exams:  Periodic exam   Xrays:     none  Type of Treatment:  Adult Prophy -  Hand scaling,  Polished, Flossed  Reviewed OHI  Brush:  2X/day and Floss 1X/day  Recommended Listerine  / ACT  mouth rinses  EO/OCS Exams:  No significant findings  IO: No significant findings  Occlusion:   Class I  Oral Hygiene:  Fair  /Poor  Plaque:  Light  Calculus:  Light  / Moderate   Bleeding:   Heavy  Gingiva:    Red  / Spongy /  Inflamed  Stain:  none  Perio Charting:  Periocharting was not completed  Perio Findings:   Pregnancy gingivitis  Caries Findings:  No decay  Pt's temporary crown #19 came off while polishing  Oz Leone re-cemented it with Geraldene Pulse  Pt satisfied  Pt is still interested in getting an implant to replace  #30    Caries Risk Assessment:    Moderate caries risk    Treatment Plan:  Updated    Dr  Exam:  Dr Clovis Astudillo  Referral:  No referral given   NV1:  6mrc w/ Bws - 60 min

## 2022-11-25 ENCOUNTER — TELEPHONE (OUTPATIENT)
Dept: PEDIATRICS CLINIC | Facility: CLINIC | Age: 32
End: 2022-11-25

## 2022-11-25 DIAGNOSIS — R12 HEARTBURN DURING PREGNANCY IN SECOND TRIMESTER: Primary | ICD-10-CM

## 2022-11-25 DIAGNOSIS — O26.892 HEARTBURN DURING PREGNANCY IN SECOND TRIMESTER: Primary | ICD-10-CM

## 2022-11-25 RX ORDER — FAMOTIDINE 20 MG/1
20 TABLET, FILM COATED ORAL 2 TIMES DAILY
Qty: 60 TABLET | Refills: 3 | Status: SHIPPED | OUTPATIENT
Start: 2022-11-25

## 2022-11-25 NOTE — TELEPHONE ENCOUNTER
Patient states that she has terrible acid reflux and Britton Wallace and the tums is not helping is there something else she can take  Please call

## 2022-11-25 NOTE — TELEPHONE ENCOUNTER
I returned patient's call and she reports heartburn unrelieved with Tums  Rx Pepcid sent to her pharmacy  She verbalizes understanding

## 2022-11-27 ENCOUNTER — NURSE TRIAGE (OUTPATIENT)
Dept: OTHER | Facility: OTHER | Age: 32
End: 2022-11-27

## 2022-11-27 ENCOUNTER — HOSPITAL ENCOUNTER (EMERGENCY)
Facility: HOSPITAL | Age: 32
Discharge: HOME | End: 2022-11-27
Attending: EMERGENCY MEDICINE

## 2022-11-27 VITALS
RESPIRATION RATE: 16 BRPM | OXYGEN SATURATION: 96 % | HEART RATE: 83 BPM | TEMPERATURE: 98.2 F | DIASTOLIC BLOOD PRESSURE: 58 MMHG | SYSTOLIC BLOOD PRESSURE: 127 MMHG | WEIGHT: 193.78 LBS | BODY MASS INDEX: 32.25 KG/M2

## 2022-11-27 DIAGNOSIS — B34.9 VIRAL SYNDROME: ICD-10-CM

## 2022-11-27 DIAGNOSIS — R11.10 VOMITING: Primary | ICD-10-CM

## 2022-11-27 LAB
BACTERIA UR QL AUTO: ABNORMAL /HPF
BILIRUB UR QL STRIP: NEGATIVE
CLARITY UR: CLEAR
COLOR UR: YELLOW
EXT PREGNANCY TEST URINE: POSITIVE
EXT. CONTROL: ABNORMAL
FLUAV RNA RESP QL NAA+PROBE: NEGATIVE
FLUBV RNA RESP QL NAA+PROBE: NEGATIVE
GLUCOSE UR STRIP-MCNC: NEGATIVE MG/DL
HGB UR QL STRIP.AUTO: NEGATIVE
KETONES UR STRIP-MCNC: NEGATIVE MG/DL
LEUKOCYTE ESTERASE UR QL STRIP: NEGATIVE
NITRITE UR QL STRIP: NEGATIVE
NON-SQ EPI CELLS URNS QL MICRO: ABNORMAL /HPF
PH UR STRIP.AUTO: 7.5 [PH] (ref 4.5–8)
PROT UR STRIP-MCNC: ABNORMAL MG/DL
RBC #/AREA URNS AUTO: ABNORMAL /HPF
RSV RNA RESP QL NAA+PROBE: NEGATIVE
SARS-COV-2 RNA RESP QL NAA+PROBE: POSITIVE
SP GR UR STRIP.AUTO: 1.02 (ref 1–1.03)
UROBILINOGEN UR QL STRIP.AUTO: 1 E.U./DL
WBC #/AREA URNS AUTO: ABNORMAL /HPF

## 2022-11-27 RX ORDER — ACETAMINOPHEN 325 MG/1
650 TABLET ORAL ONCE
Status: COMPLETED | OUTPATIENT
Start: 2022-11-27 | End: 2022-11-27

## 2022-11-27 RX ADMIN — ACETAMINOPHEN 650 MG: 325 TABLET ORAL at 12:29

## 2022-11-27 NOTE — ED PROVIDER NOTES
Pt Name: Melodie Young  MRN: [id number]  [place] 1990  Age/Sex: 29 y o  female  Date of evaluation: 2022  PCP: Marianela Ojeda, [address] [place] [address]    Chief Complaint   Patient presents with   • Vomiting     Pt 27 weeks and 4 days presents with 1 vomiting episode this morning while at work  Pt c/o 10 headache, and a cough  Pt denies abdominal pain, abdominal pressure and states baby has been "moving fine"         HPI    Marlena Jones presents to the Emergency Department complaining of generally not feeling well  She vomited at work today as well  She ran out of covid tests and wanted to check  She has no pregnancy related complaints  She has been feeling the baby move  HPI      Past Medical and Surgical History    Past Medical History:   Diagnosis Date   • Anxiety     no medications   • Chlamydia     early    • Urogenital trichomoniasis        Past Surgical History:   Procedure Laterality Date   • INDUCED       Surgically induced  Description: 2012        Family History   Problem Relation Age of Onset   • Hypertension Mother    • Thyroid disease Mother    • Asthma Mother    • Breast cancer Neg Hx    • Colon cancer Neg Hx    • Ovarian cancer Neg Hx    • Cancer Neg Hx    • Diabetes Neg Hx    • Venous thrombosis Neg Hx        Social History     Tobacco Use   • Smoking status: Never   • Smokeless tobacco: Former   • Tobacco comments:     hookah   Vaping Use   • Vaping Use: Never used   Substance Use Topics   • Alcohol use: Not Currently     Comment: occasionally but stopped when found out pregnant   • Drug use: Never           Allergies    No Known Allergies    Home Medications    Prior to Admission medications    Medication Sig Start Date End Date Taking?  Authorizing Provider   aspirin (ECOTRIN LOW STRENGTH) 81 mg EC tablet Take 2 tablets (162 mg total) by mouth daily 22   Amanda Cat MD   cetirizine (ZyrTEC) 10 mg tablet  22   Historical Provider, MD   docusate sodium (COLACE) 100 mg capsule Take 1 capsule (100 mg total) by mouth 2 (two) times a day as needed for constipation 8/17/22   Dong Swartz MD   doxylamine (UNISOM) 25 MG tablet Take 1 tablet (25 mg total) by mouth daily at bedtime as needed for sleep 8/3/22   Jermain Pak MD   famotidine (PEPCID) 20 mg tablet Take 1 tablet (20 mg total) by mouth 2 (two) times a day 11/25/22   DOMONIQUE Wheeler   Multiple Vitamins-Minerals (multivitamin with minerals) tablet Take 1 tablet by mouth daily  Patient not taking: Reported on 8/16/2022    Historical Provider, MD   ondansetron (Zofran ODT) 4 mg disintegrating tablet Take 1 tablet (4 mg total) by mouth every 6 (six) hours as needed for nausea or vomiting 10/7/22   DOMONIQUE Hughes   Prenatal 27-1 MG TABS Take 1 tablet by mouth in the morning 8/3/22   Jermain Pak MD   pyridoxine (B-6) 25 MG tablet Take 1 tablet (25 mg total) by mouth daily  Patient not taking: Reported on 10/4/2022 9/6/22 10/6/22  Yulissa Andino MD           Review of Systems    Review of Systems   Constitutional: Positive for fatigue  Negative for chills and fever  HENT: Positive for sore throat  Negative for ear pain  Eyes: Negative for pain and visual disturbance  Respiratory: Negative for cough and shortness of breath  Cardiovascular: Negative for chest pain and palpitations  Gastrointestinal: Positive for nausea and vomiting  Negative for abdominal pain  Genitourinary: Negative for dysuria and hematuria  Musculoskeletal: Negative for arthralgias and back pain  Skin: Negative for color change and rash  Neurological: Negative for seizures and syncope  All other systems reviewed and are negative        Physical Exam      ED Triage Vitals   Temperature Pulse Respirations Blood Pressure SpO2   11/27/22 1110 11/27/22 1110 11/27/22 1110 11/27/22 1110 11/27/22 1110   98 2 °F (36 8 °C) 83 16 127/58 96 %      Temp Source Heart Rate Source Patient Position - Orthostatic VS BP Location FiO2 (%)   11/27/22 1110 11/27/22 1110 11/27/22 1110 11/27/22 1110 --   Oral Monitor Sitting Right arm       Pain Score       11/27/22 1204       7               Physical Exam  Vitals and nursing note reviewed  Constitutional:       General: She is not in acute distress  Appearance: She is well-developed  HENT:      Head: Normocephalic and atraumatic  Eyes:      Conjunctiva/sclera: Conjunctivae normal    Cardiovascular:      Rate and Rhythm: Normal rate and regular rhythm  Heart sounds: No murmur heard  Pulmonary:      Effort: Pulmonary effort is normal  No respiratory distress  Breath sounds: Normal breath sounds  Abdominal:      Palpations: Abdomen is soft  Tenderness: There is no abdominal tenderness  Comments: Gravid abdomen   Musculoskeletal:         General: No swelling  Cervical back: Neck supple  Skin:     General: Skin is warm and dry  Capillary Refill: Capillary refill takes less than 2 seconds  Neurological:      Mental Status: She is alert  Psychiatric:         Mood and Affect: Mood normal                 Assessment and Plan    Gisel Pappas is a 29 y o  female who presents with generalized fatigue, sore throat and an episode of vomiting  Physical examination unremarkable  Differential diagnosis (not completely inclusive) includes viral illness  Plan will be to perform diagnostic testing and treat symptomatically        MDM    Diagnostic Results      Labs:    Results for orders placed or performed during the hospital encounter of 11/27/22   FLU/RSV/COVID - if FLU/RSV clinically relevant    Specimen: Nasopharyngeal Swab; Nares   Result Value Ref Range    SARS-CoV-2 Positive (A) Negative    INFLUENZA A PCR Negative Negative    INFLUENZA B PCR Negative Negative    RSV PCR Negative Negative   Urine Microscopic   Result Value Ref Range    RBC, UA None Seen None Seen, 2-4 /hpf    WBC, UA 10-20 (A) None Seen, 2-4, 5-60 /hpf    Epithelial Cells Occasional None Seen, Occasional /hpf    Bacteria, UA Moderate (A) None Seen, Occasional /hpf   POCT pregnancy, urine   Result Value Ref Range    EXT Preg Test, Ur Positive (A)     Control Valid    Urine Macroscopic, POC   Result Value Ref Range    Color, UA Yellow     Clarity, UA Clear     pH, UA 7 5 4 5 - 8 0    Leukocytes, UA Negative Negative    Nitrite, UA Negative Negative    Protein,  (2+) (A) Negative mg/dl    Glucose, UA Negative Negative mg/dl    Ketones, UA Negative Negative mg/dl    Urobilinogen, UA 1 0 0 2, 1 0 E U /dl E U /dl    Bilirubin, UA Negative Negative    Occult Blood, UA Negative Negative    Specific Gravity, UA 1 025 1 003 - 1 030       All labs reviewed and utilized in the medical decision making process    Radiology:    No orders to display       All radiology studies independently viewed by me and interpreted by the radiologist     Procedure    Procedures      ED Course of Care and Re-Assessments        Medications   acetaminophen (TYLENOL) tablet 650 mg (650 mg Oral Given 11/27/22 1229)           FINAL IMPRESSION    Final diagnoses:   Vomiting   Viral syndrome         DISPOSITION/PLAN    Time reflects when diagnosis was documented in both MDM as applicable and the Disposition within this note     Time User Action Codes Description Comment    11/27/2022 12:03 PM Mechele Fossa Add [R11 10] Vomiting     11/27/2022 12:03 PM Mechele Fossa Add [B34 9] Viral syndrome       ED Disposition     ED Disposition   Discharge    Condition   Stable    Date/Time   Sun Nov 27, 2022 12:03 PM    Comment   Alpesh Cameron discharge to home/self care                 Follow-up Information     Follow up With Specialties Details Why Contact Info Additional Information    Norberto Del Cid DO Family Medicine Schedule an appointment as soon as possible for a visit   [address] Obstetrics and Gynecology Schedule an appointment as soon as possible for a visit   [address]  [address] [facility] [address]  [address], [address], [address] [place], [place], [address] [phone]            PATIENT REFERRED TO:    Connie Sanchez, [address] [facility] [address] [facility] [address] [facility]  [phone]    Schedule an appointment as soon as possible for a visit       [place]  [address]  [address] [facility] [address] [phone]  Schedule an appointment as soon as possible for a visit         DISCHARGE MEDICATIONS:    Discharge Medication List as of 11/27/2022 12:04 PM      CONTINUE these medications which have NOT CHANGED    Details   aspirin (ECOTRIN LOW STRENGTH) 81 mg EC tablet Take 2 tablets (162 mg total) by mouth daily, Starting Tue 9/13/2022, Normal      cetirizine (ZyrTEC) 10 mg tablet Starting Mon 4/11/2022, Historical Med      docusate sodium (COLACE) 100 mg capsule Take 1 capsule (100 mg total) by mouth 2 (two) times a day as needed for constipation, Starting Wed 8/17/2022, No Print      doxylamine (UNISOM) 25 MG tablet Take 1 tablet (25 mg total) by mouth daily at bedtime as needed for sleep, Starting Wed 8/3/2022, Normal      famotidine (PEPCID) 20 mg tablet Take 1 tablet (20 mg total) by mouth 2 (two) times a day, Starting Fri 11/25/2022, Normal      ondansetron (Zofran ODT) 4 mg disintegrating tablet Take 1 tablet (4 mg total) by mouth every 6 (six) hours as needed for nausea or vomiting, Starting Fri 10/7/2022, Normal      Prenatal 27-1 MG TABS Take 1 tablet by mouth in the morning, Starting Wed 8/3/2022, Normal      pyridoxine (B-6) 25 MG tablet Take 1 tablet (25 mg total) by mouth daily, Starting Tue 9/6/2022, Until Thu 10/6/2022, Normal             No discharge procedures on file           Luretha Purcrista, DO     Luretha Purl, DO  11/27/22 1736

## 2022-11-27 NOTE — TELEPHONE ENCOUNTER
Regardin week pregnant and Covid positive with cough and headach  ----- Message from [employer] sent at 2022  5:12 PM EST -----  "I am 27 weeks pregnant and I tested positive today for Covid  I want to know what I can take to feel better   I have a cough, headache, and I am spitting up phlegm "

## 2022-11-27 NOTE — ED NOTES
Spoke to [place] from [facility], who stated pt can be seen and evaluated in the ED     Patrick Cook RN  11/27/22 0245 Yes

## 2022-11-27 NOTE — Clinical Note
Mat Stewart was seen and treated in our emergency department on 11/27/2022  Diagnosis:     Sandro Oshea  may return to work on return date  She may return on this date: 11/29/2022         If you have any questions or concerns, please don't hesitate to call        Brianne Ambrocio, DO    ______________________________           _______________          _______________  Hospital Representative                              Date                                Time

## 2022-11-27 NOTE — TELEPHONE ENCOUNTER
Reason for Disposition  • [1] [id number] diagnosed by positive lab test (e g , PCR, rapid self-test kit) AND [2] mild symptoms (e g , cough, fever, others) AND [2] no complications or SOB    Answer Assessment - Initial Assessment Questions  1  Were you within 6 feet or less, for up to 15 minutes or more with a person that has a confirmed COVID-19 test?     Unknown    2  What was the date of your exposure? Unknown    3  Are you experiencing any symptoms attributed to the virus?  (Assess for SOB, cough, fever, difficulty breathing)     Cough, nasal congestion, headache  4  HIGH RISK: Do you have any history heart or lung conditions, weakened immune system, diabetes, Asthma, CHF, HIV, COPD, Chemo, renal failure, sickle cell, etc?      No    5  PREGNANCY: Are you pregnant or did you recently give birth?       Yes-    Protocols used: CORONAVIRUS (COVID-19) DIAGNOSED OR SUSPECTED-ADULT-

## 2022-11-28 LAB — BACTERIA UR CULT: NORMAL

## 2022-11-29 PROCEDURE — T1002 RN SERVICES UP TO 15 MINUTES: HCPCS | Performed by: REGISTERED NURSE

## 2022-11-30 ENCOUNTER — TELEPHONE (OUTPATIENT)
Dept: OBGYN CLINIC | Facility: CLINIC | Age: 32
End: 2022-11-30

## 2022-12-06 ENCOUNTER — ROUTINE PRENATAL (OUTPATIENT)
Dept: OBGYN CLINIC | Facility: CLINIC | Age: 32
End: 2022-12-06

## 2022-12-06 ENCOUNTER — OFFICE VISIT (OUTPATIENT)
Dept: FAMILY MEDICINE CLINIC | Facility: CLINIC | Age: 32
End: 2022-12-06

## 2022-12-06 VITALS
SYSTOLIC BLOOD PRESSURE: 124 MMHG | HEART RATE: 98 BPM | DIASTOLIC BLOOD PRESSURE: 84 MMHG | HEIGHT: 65 IN | WEIGHT: 195 LBS | BODY MASS INDEX: 32.49 KG/M2 | TEMPERATURE: 97.6 F

## 2022-12-06 VITALS
HEART RATE: 98 BPM | BODY MASS INDEX: 32.45 KG/M2 | WEIGHT: 195 LBS | DIASTOLIC BLOOD PRESSURE: 84 MMHG | SYSTOLIC BLOOD PRESSURE: 124 MMHG

## 2022-12-06 DIAGNOSIS — B34.9 VIRAL SYNDROME: Primary | ICD-10-CM

## 2022-12-06 DIAGNOSIS — Z34.93 PRENATAL CARE IN THIRD TRIMESTER: Primary | ICD-10-CM

## 2022-12-06 DIAGNOSIS — Z3A.29 29 WEEKS GESTATION OF PREGNANCY: ICD-10-CM
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
## 2022-12-06 NOTE — PROGRESS NOTES
Assessment/Plan:      Diagnoses and all orders for this visit:    Viral syndrome        Subjective:     Patient ID: Avis Hsu is a 29 y o  female  She is complaining of persistent cough and nasal congestion since having COVID over a week ago  No fever or shortness of breath  Review of Systems   Constitutional: Negative for fever  HENT: Positive for congestion  Respiratory: Positive for cough  Negative for shortness of breath  Cardiovascular: Negative  Gastrointestinal: Negative  Gravid 29 weeks  Objective:     Physical Exam  Vitals and nursing note reviewed  Constitutional:       Appearance: She is well-developed and well-nourished  HENT:      Head: Normocephalic and atraumatic  Eyes:      Pupils: Pupils are equal, round, and reactive to light  Neck:      Vascular: No JVD  Cardiovascular:      Rate and Rhythm: Normal rate  Pulses: Normal pulses  Pulmonary:      Effort: Pulmonary effort is normal    Abdominal:      Palpations: Abdomen is soft  Musculoskeletal:      Cervical back: Normal range of motion  Lymphadenopathy:      Cervical: No cervical adenopathy  Neurological:      Mental Status: She is alert and oriented to person, place, and time

## 2022-12-06 NOTE — PATIENT INSTRUCTIONS
The Third Trimester  (28-42 weeks)  YOUR BABY   * your baby sucks its thumb now! * your baby can hear voices and respond to touch…   so talk to him or her!!   * your baby’s brain grows and develops most in the last 2 months of pregnancy   * baby’s head and bones are soft and flexible so they can fit through the birth canal   * baby’s movements change towards the end of pregnancy because there is less room for kicking and stretching in your belly   * baby’s lungs are not fully developed and completely ready to breathe on their own until the last 3-4 weeks before your due date    YOUR BODY   * your belly is growing a lot now   * it may become more difficult to sleep well at night or to be as active as you usually are   * you may sweat more than usual   * you will become more off-balance……be careful not to fall!!   * you may develop hemorrhoids (which can be painful and make it difficult to sit down)   * the last two months of pregnancy can become very uncomfortable……with backaches, headaches, and heartburn   * you can start to have contractions……  as long as they are irregular and less than 5 per hour, this is a normal part of your body getting ready to have a baby   * your cervix may start to thin out and open up……to get ready for delivery   * you may find yourself needing to “pee” very often……  because baby is pressing on your bladder so much   * you may get out of breathe more quickly than usual      FETAL KICK COUNTS    In the third trimester (after 28 weeks gestation) you should be performing fetal kick counts every day  Your baby should move at least 10 times in 2 hours during an active time, once a day  Choose atime of day when your baby is most active  Try to do this around the same time each day  Get into a comfortable position and then write down the time your baby first moves  Count each movement until the baby moves 10 times  These movements include kicks, punches, nudges, flutters, or rolls    This can take anywhere from 5 minutes to 2 hours  Write down the time you feel the baby's 10th movement  If 2 hours has passed and your baby has not moved at least 10 times, you should CALL THE OFFICE RIGHT AWAY  [phone]  PREMATURE LABOR     When to call [phone]:  * I need to call immediately if I have even a small amount of LIQUID leaking from my vagina, with or without contractions  * I need to call if I am BLEEDING from my vagina  * I need to call if I am feeling CRAMPING that continues after drinking 2-3 glasses of water and lying down on my side for one hour and that feels like I am having a period  * I need to call if I feel CONTRACTIONS  more than 4 times in an hour that feels like the baby is “balling up” even after I try drinking 2-3 glasses of water and lying down on my side for an hour  * I need to call if I notice a change in my vaginal DISCHARGE  * I need to call if I am feeling PELVIC PRESSURE  that feels like the baby is pushing down into my vagina and lasts more than an hour  * I need to call if I have LOW BACKACHE which is new and near my tailbone  It may either come and go several times during an hour or stay there constantly  PRE-ECLAMPSIA     What is it? Pre-eclampsia is a serious disease that can occur during pregnancy related to high blood pressures  It can happen to any woman  Why should I care? Women who develop pre-eclampsia have serious risks which can include seizures, stroke, organ damage, premature birth of their baby  In the very worst cases, it can cause death of the mother and/or their baby  What should I pay attention to?    Signs and symptoms of pre-eclampsia can include:   * Severe swelling of face or hands    * A headache that will not go away even after you have taken Tylenol   * Seeing spots or changes in eyesight    * Pain in the upper abdomen or shoulder    * New nausea and vomiting (in the second half of pregnancy)    * Sudden weight gain    * Difficulty breathing     What should I do? If you experience any of the above symptoms of pre-eclampsia, contact your OB provider  Finding pre-eclampsia early is important for you and your baby  Call us at [phone]  [place] BREASTFEEDING     BENEFITS FOR BABIES   * stronger immune systems (less allergies, eczema, asthma, and childhood cancers)   * less diarrhea and constipation or other GI diseases   * fewer colds and ear infections   * better vision and teeth (fewer cavities)   * improves IQ   * lower rates of diabetes and obesity in childhood     BENEFITS FOR MOMS   * promotes faster weight loss after delivery   * lower risk for postpartum depression   * lower risk for breast, uterine, and ovarian cancers   * lower risk for osteoporosis developing with age   * easier than formula - is always right with you, clean, and the right temperature   * less expensive than formula……it’s FREE !!!!     KEYS TO SUCCESSFUL BREASTFEEDING   * keep baby skin-to-skin until after first feeding event   * keep baby in your room with you during your hospital stay after delivery   * avoid any bottle feedings (unless medically necessary)   * limit the use of pacifiers and swaddling   * ask for help if you are having any issues……lactation consultants (who specialize in breastfeeding) are available to help you   * a healthy diet for mom……eating a variety of foods and portions in moderation    THINGS YOU SHOULD KNOW ABOUT BREASTFEEDING   * most medications are considered compatible with breastfeeding by the [address] [place] [address] Academy of Pediatrics, but you should check with your health care provider or lactation consultant prior to taking a new medication……just to be sure it is safe   * alcohol (beer, wine, liquor) can be passed from mother to baby through breast milk……an occasional, social drink is deemed acceptable by the American Academy of [address] [facility] [place]   more than that should be avoided   * breastfeeding is NOT an effective method of birth control   * nicotine (in cigarettes) can pass from mother to baby through breast milk…   however, for mothers who smoke, it is still healthier to breastfeed than use formula   * caffeine should be limited to 1-3 cups per day……includes coffee, soda, energy drinks         PERINEAL / VAGINAL MASSAGE    What can I do now to decrease my chances of tearing during delivery? Massaging around the vaginal opening by you (or your partner), either antepartum (before birth) or during the second stage of labor, can reduce the likelihood of perineal tearing during childbirth  Likewise, the use of warm packs held on the perineum during the pushing stage of labor can reduce the severity of your tear  This will happen during the pushing stage of labor  At home, you can also help reduce the chances of injury that may occur during the birth of your child through perineal massage  When should I do this? Starting around or shortly after 34 weeks of pregnancy, you or your partner should provide 5-10 minutes of vaginal massage 1-4 times per week  How? Use either almond, coconut, or olive oil and water mixture on 1 or 2 fingers (depending on comfort)  Insert finger(s) 3-5cm into the vagina  Apply sweeping downward/sideward pressure from 3 to 9 o'clock for 5-10 minutes, 1-4 times per week  WARNING SIGNS DURING PREGNANCY  Call our office at [phone] if you experience any of the followin  Vaginal bleeding  2  Sharp abdominal pain that does not go away  3  Fever (more than 100 4 and is not relieved by Tylenol)  4  Persistent vomiting lasting greater than 24 hours  5  Chest pain   6  Pain or burning when you urinate  7  Severe headache that doesn't resolve with Tylenol  8  Blurred vision or seeing spots in your vision  9  Sudden swelling of your face or hands  10  Redness, swelling or pain in a leg  11  A sudden weight gain in just a few days  12   Decrease in your baby's movement (after 28 weeks or the 6th month of pregnancy)  13  A loss of watery fluid from your vagina - can be a gush, a trickle or continuous wetness  14  After 20 weeks of pregnancy, rhythmic cramping (greater than 4 per hour) or menstrual like low/pelvic pain          VACCINES IN PREGNANCY    TDAP  Whooping cough (or pertussis) can be serious for anyone, but for your , it can be life-threatning  Up to 20 babies die each year in the [place]  Due to whooping cough  About half of babies younger than 3year old who get whooping cough need treatment in the hospital   The younger the baby is when he or she gets whooping cough, the more likely he or she will need to be treated in a hospital   When you receive the whooping cough vaccine (Tdap) during your pregnancy, your body will create protective antibodies and pass some of them to your baby before birth  These antibodies can help protect your baby from getting whooping cough until they are old enough to be vaccinated themselves (usually around 7 months of age)  INFLUENZA  Changes in your immune, heart, and lung functions during pregnancy make you more likely to get seriously ill from the flu  Catching the flu also increases your chances for serious problems for your developing baby, including premature labor and delivery  It is recommended that all women who are pregnant during flu season should receive an influenza vaccine  Coronavirus (RVFOL-03) pregnancy FAQs: Medical experts answer your questions  From [employer] cy  com/0_coronavirus-covid-19-pregnancy-faq-medical-ytzmeky-ujdiqn-vx_77158273  bc (courtesy of [facility])  As of 3/18/20  By Elisa Mccain 41  Medically reviewed by Society for Maternal-Fetal Medicine       We asked parents-to-be to send us their most pressing questions about coronavirus (COVID-19)  Among them: Is it still safe to deliver in a hospital? What if my ob-gyn has the virus?  We sent those questions to the top docs at the [facility]  Here are their answers  The coronavirus (COVID-19) pandemic has been declared a national emergency in the [facility] by [employer]  Moms-to-be like you are concerned about everything from getting medicines to managing disruptions at work  But above and beyond any worry about lifestyle changes is a focus on your health and the impact of COVID-19 on your pregnancy  We asked obstetrics doctors who handle the most complicated pregnancy issues to answer your questions about the coronavirus  Here are their responses, provided by Dr Michelle Dover and her colleagues at the [facility]  Am I at more risk for COVID-19 if I'm pregnant? We don't know  Pregnancy does change your immune system in ways that might make you more susceptible to viral respiratory infections like COVID-19  If you become infected, you might also be at higher risk for more severe illness compared to the general population  Although this does not appear to be the case with COVID-19, based on limited data so far, a higher risk has been true for pregnant women with other coronavirus infections (SARS-CoV and MERS-CoV) and other viral respiratory infections, such as flu  It's important to take preventive actions to avoid infection, such as washing your hands often and avoiding people who are sick  How might coronavirus affect my pregnancy? Again, we don't know  Women with other coronavirus infections (such as SARS-CoV) did not have miscarriage or stillbirth at higher rates than the general population  We know that having other respiratory viral infections during pregnancy, such as flu, has been associated with problems like low birth weight and  birth  Also, having a high fever early in pregnancy may increase the risk of certain birth defects  Could I transmit coronavirus to my baby during pregnancy or delivery?   We don't know whether you could transmit COVID-19 to your baby before or during delivery  However, among the few case studies of infants born to mothers with COVID-23 published in peer-reviewed literature, none of the infants tested positive for the virus  Also, there was no virus detected in samples of amniotic fluid or breast milk  There have been a few reports of newborns as young as a few days old with infection, suggesting that a mother can transmit the infection to her infant through close contact after delivery  There have been no reports of mother-to-baby transmission for other coronaviruses (MERS-CoV and SARS-CoV), although only limited information is available  Is it safe for me to deliver at a hospital where there have been COVID-19 cases? Yes  We know that COVID-19 is a very scary virus  The good news is that hospitals are taking great precautions to keep patients and healthcare providers safe  When a patient is even suspected to have COVID-19, they are placed in a negative pressure room  (Think of these rooms as vacuums that suck and filter the air so it's safe for the other people in the hospital)  This makes it possible for you to deliver at the hospital without putting you or your baby at risk  Hospitals are also implementing stricter visiting policies to keep patients safe  It's worth calling your hospital to check if there are any new regulations to be aware of  What plans should I make now in case the hospital system is overwhelmed when it's time for me to deliver? This is a great question to talk with your doctor or midwife about when you're 34 to 36 weeks pregnant  Every hospital is making different plans for dealing with this scenario  I work in healthcare  Should I ask my doctor to excuse me from work until the baby is born? What if I work in a school, the travel [address], or some other high-risk setting?   Healthcare facilities should take care to limit the exposure of pregnant employees to patients with confirmed or suspected COVID-19, just as they would with other infectious cases  If you continue working, be sure to follow the CDC's risk assessment and infection control guidelines  If you work in a school, travel [place], or other high-risk setting, talk with your employer about what it's doing to protect employees and minimize infection risks  Wash your hands often  What if my OB gets COVID-19? If your doctor or midwife tests positive for COVID-19, they will need to be quarantined until they recover and are no longer at risk of transmitting the virus  In this case, you'll be assigned to another OB in your doctor's practice (or you may choose another practitioner yourself)  Ask your new OB or your doctor's office if you should self-quarantine or be tested for the virus  (It will depend on when you last saw your provider and when that person tested positive )    Should we hold off on trying to conceive because of COVID-19? At this time, there's no reason to hold off on trying to get pregnant, but the data we have is really limited  For example, we don't think the virus causes birth defects or increases your risk of miscarriage  But we don't know whether you could transmit COVID-19 to your baby before or during delivery  We also don't know if the virus lives in semen or can be sexually transmitted  We have a babymoon scheduled in the next few months - should we cancel? If you're planning to travel internationally or on a cruise, you should strongly consider canceling  At this time, the virus has reached more than 140 countries, and there are travel bans to [place], most of [place], and [place]  Places where large numbers of people gather are at highest risk, especially airports and cruise ships  If you're planning travel in the [place] , note that any travel setting increases your risk of exposure, and there may be travel bans even in [place] by the time you're scheduled to go   Even if you're state is not blocked, you'll definitely want to avoid traveling to communities where the virus is spreading  To find out where these places are, check The [place] Times map based on CDC data  For the most current advice to help you avoid exposure, check the CDC's COVID-19 travel page  Will the hospital separate me from my  and keep the baby in quarantine? If you test positive for COVID-19 or have been exposed but have no symptoms, the CDC, [employer] of Obstetricians and Gynecologists, and the Society for [place] 250 all recommend that you be  from your baby to decrease the risk of transmission to the baby  This would last until you are no longer at risk of transmitting the virus  If you do not have COVID-19 and have not been exposed to the virus, the hospital will not separate you from your   My hospital is restricting visitors and only allowing one support person  If my support person leaves after the delivery, will they be allowed to come back? Every hospital has different policies  Contact your hospital or labor and delivery unit a week or so before delivery to get the most up-to-date restrictions  In general, if your support person needs to leave, they would be allowed back unless they knew they were exposed to COVID-19 after leaving your company  [facility] understands that the coronavirus (COVID-19) pandemic is an evolving story and that your questions will change over time  We'll continue asking moms and dads in our Community what they want to know, and we'll get the answers from experts to keep them - and you - informed and supported  My mom was planning to fly here to help me care for my new baby after delivery  Should I tell her not to come? Yes  If your mom is over 61 or has any serious chronic medical conditions (such as heart disease, lung disease, or diabetes), she is at higher risk of serious illness from COVID-19 and should avoid air travel   And remember that any travel setting increases a person's risk of exposure  So, it may be risky to have her around the baby after she has been traveling  For the most current advice on traveling, check the CDC's COVID-19 travel page  [facility] understands that the coronavirus pandemic is an evolving story and that your questions will change over time  We'll continue asking moms and dads in our Community what they want to know, and we'll get the answers from experts to keep them - and you - informed and supported       Return in 2 weeks

## 2022-12-09 DIAGNOSIS — O99.013 ANEMIA AFFECTING PREGNANCY IN THIRD TRIMESTER: Primary | ICD-10-CM

## 2022-12-09 LAB
DME PARACHUTE DELIVERY DATE REQUESTED: NORMAL
DME PARACHUTE ITEM DESCRIPTION: NORMAL
DME PARACHUTE ORDER STATUS: NORMAL
DME PARACHUTE SUPPLIER NAME: NORMAL
DME PARACHUTE SUPPLIER PHONE: NORMAL

## 2022-12-09 RX ORDER — FERROUS SULFATE TAB EC 324 MG (65 MG FE EQUIVALENT) 324 (65 FE) MG
324 TABLET DELAYED RESPONSE ORAL
Qty: 60 TABLET | Refills: 0 | Status: SHIPPED | OUTPATIENT
Start: 2022-12-09

## 2022-12-10 ENCOUNTER — NURSE TRIAGE (OUTPATIENT)
Dept: OTHER | Facility: OTHER | Age: 32
End: 2022-12-10

## 2022-12-10 NOTE — TELEPHONE ENCOUNTER
Regarding: sharp left side pain - pregnant  ----- Message from Sangeeta Jaquez sent at 12/10/2022  4:46 PM EST -----  "I'm 29 weeks, i have sharp upper left abdominal pain like 3 inches underneath my book   should i be concerned?"

## 2022-12-10 NOTE — TELEPHONE ENCOUNTER
Reason for Disposition  • [1] MILD pain (e g , does not interfere with normal activities) AND [2] pain comes and goes (cramps) AND [3] present > 48 hours  (Exception: this same abdominal pain is a chronic symptom recurrent or ongoing AND present > 4 weeks)    Answer Assessment - Initial Assessment Questions  1  LOCATION: "Where does it hurt?"      Left sided abdominal pain  2  RADIATION: "Does the pain shoot anywhere else?" (e g , chest, back)      denies  3  ONSET: "When did the pain begin?" (e g , minutes, hours or days ago)       Monday 4  SUDDEN: "Gradual or sudden onset?"      gradual  5  PATTERN "Does the pain come and go, or is it constant?"     - If constant: "Is it getting better, staying the same, or worsening?"       (Note: Constant means the pain never goes away completely; most serious pain is constant and it progresses)      - If intermittent: "How long does it last?" "Do you have pain now?"      (Note: Intermittent means the pain goes away completely between bouts)      intermittent  6  SEVERITY: "How bad is the pain?"  (e g , Scale 1-10; mild, moderate, or severe)    - MILD (1-3): doesn't interfere with normal activities, abdomen soft and not tender to touch     - MODERATE (4-7): interferes with normal activities or awakens from sleep, tender to touch     - SEVERE (8-10): excruciating pain, doubled over, unable to do any normal activities       mild  8  CAUSE: "What do you think is causing the stomach pain?"      unsure  9  RELIEVING/AGGRAVATING FACTORS: "What makes it better or worse?" (e g , movement, antacids, bowel movement)      Rest, shower resolves the pain  10  OTHER SYMPTOMS: "Has there been any vomiting, diarrhea, constipation, or urine problems?"        denies  11   PREGNANCY: "Is there any chance you are pregnant?" "When was your last menstrual period?"        29w4d    Protocols used: ABDOMINAL PAIN - [facility]

## 2022-12-10 NOTE — TELEPHONE ENCOUNTER
Patient notes she started with a sharp pain in her upper left side on Monday  After resting and taking a shower the pain resolved  Patient notes that it has returned today while she is at work  Denies any leaking of fluid, bleeding, nausea or other symptoms  Normal fetal movement  Care advice given

## 2022-12-12 ENCOUNTER — TELEPHONE (OUTPATIENT)
Dept: OBGYN CLINIC | Facility: CLINIC | Age: 32
End: 2022-12-12

## 2022-12-12 NOTE — TELEPHONE ENCOUNTER
----- Message from Aftab Ortiz MD sent at 12/9/2022  3:32 PM EST -----  Please call the patient to let her know her CBC collected 2 weeks ago is overall not concerning  She has slight anemia and should start taking iron tablets- I am sending a prescription to her pharmacy

## 2022-12-12 NOTE — TELEPHONE ENCOUNTER
Contacted patient to follow up call to the office on the weekend  She stated it is her first baby so she just wants to make sure that she is checking that everything is ok  She said the pain comes and goes and it feels like it is a soreness from working out  She said she feels ok and will keep her appointment on 12/20  Advised that if she needs to change her appointment to a sooner date please contact the office

## 2022-12-13 ENCOUNTER — ROUTINE PRENATAL (OUTPATIENT)
Dept: OBGYN CLINIC | Facility: CLINIC | Age: 32
End: 2022-12-13

## 2022-12-13 VITALS
DIASTOLIC BLOOD PRESSURE: 76 MMHG | SYSTOLIC BLOOD PRESSURE: 123 MMHG | BODY MASS INDEX: 32.45 KG/M2 | HEART RATE: 93 BPM | WEIGHT: 195 LBS

## 2022-12-13 DIAGNOSIS — R87.610 ASCUS OF CERVIX WITH NEGATIVE HIGH RISK HPV: ICD-10-CM

## 2022-12-13 DIAGNOSIS — Z34.93 PRENATAL CARE IN THIRD TRIMESTER: ICD-10-CM

## 2022-12-13 DIAGNOSIS — Z3A.30 30 WEEKS GESTATION OF PREGNANCY: ICD-10-CM

## 2022-12-13 DIAGNOSIS — Z91.419: Primary | ICD-10-CM
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
## 2022-12-15 PROBLEM — Z3A.30 30 WEEKS GESTATION OF PREGNANCY: Status: ACTIVE | Noted: 2022-09-13

## 2022-12-15 PROBLEM — IMO0002: Status: ACTIVE | Noted: 2022-11-08

## 2022-12-15 PROBLEM — Z91.419: Status: ACTIVE | Noted: 2022-11-08

## 2022-12-18 NOTE — PROGRESS NOTES
Please refer to the [facility] ultrasound report in Ob Procedures for additional information regarding today's visit

## 2022-12-20 ENCOUNTER — ULTRASOUND (OUTPATIENT)
Dept: PERINATAL CARE | Facility: OTHER | Age: 32
End: 2022-12-20

## 2022-12-20 VITALS
DIASTOLIC BLOOD PRESSURE: 60 MMHG | BODY MASS INDEX: 32.49 KG/M2 | SYSTOLIC BLOOD PRESSURE: 110 MMHG | HEART RATE: 86 BPM | HEIGHT: 65 IN | WEIGHT: 195 LBS

## 2022-12-20 DIAGNOSIS — Z3A.31 31 WEEKS GESTATION OF PREGNANCY: Primary | ICD-10-CM

## 2022-12-20 DIAGNOSIS — Z36.4 ULTRASOUND FOR ANTENATAL SCREENING FOR FETAL GROWTH RESTRICTION: ICD-10-CM

## 2022-12-20 NOTE — LETTER
December 20, 2022     [facility]  [address]  [place] [id number]    Patient: Ramonita Salgado   YOB: 1990   Date of Visit: 12/20/2022       Dear Dr Marine Erazo:    Thank you for referring Ramonita Salgado to me for evaluation  Below are my notes for this consultation  If you have questions, please do not hesitate to call me  I look forward to following your patient along with you           Sincerely,        Saida Tao MD        CC: No Recipients  Saida Tao MD  12/18/2022  4:52 PM  Sign when Signing Visit  Please refer to the [facility] ultrasound report in Ob Procedures for additional information regarding today's visit

## 2022-12-20 NOTE — PATIENT INSTRUCTIONS
Kick Counts in Pregnancy   WHAT YOU NEED TO KNOW:   Kick counts measure how much your baby is moving in your womb  A kick from your baby can be felt as a twist, turn, swish, roll, or jab  It is common to feel your baby kicking at 26 to 28 weeks of pregnancy  You may feel your baby kick as early as 20 weeks of pregnancy  You may want to start counting at 28 weeks  DISCHARGE INSTRUCTIONS:   Contact your doctor immediately if:   You feel a change in the number of kicks or movements of your baby  You feel fewer than 10 kicks within 2 hours  You have questions or concerns about your baby's movements  Why measure kick counts:  Your baby's movement may provide information about your baby's health  He or she may move less, or not at all, if there are problems  Your baby may move less if he or she is not getting enough oxygen or nutrition from the placenta  Do not smoke while you are pregnant  Smoking decreases the amount of oxygen that gets to your baby  Talk to your healthcare provider if you need help to quit smoking  Tell your healthcare provider as soon as you feel a change in your baby's movements  When to measure kick counts:   Measure kick counts at the same time every day  Measure kick counts when your baby is awake and most active  Your baby may be most active in the evening  How to measure kick counts:  Check that your baby is awake before you measure kick counts  You can wake up your baby by lightly pushing on your belly, walking, or drinking something cold  Your healthcare provider may tell you different ways to measure kick counts  You may be told to do the following:  Use a chart or clock to keep track of the time you start and finish counting  Sit in a chair or lie on your left side  Place your hands on the largest part of your belly  Count until you reach 10 kicks  Write down how much time it takes to count 10 kicks  It may take 30 minutes to 2 hours to count 10 kicks  It should not take more than 2 hours to count 10 kicks  Follow up with your doctor as directed:  Write down your questions so you remember to ask them during your visits  © Copyright [employer] 2022 Information is for End User's use only and may not be sold, redistributed or otherwise used for commercial purposes  All illustrations and images included in CareNotes® are the copyrighted property of A D A M , Inc  or [facility]   The above information is an  only  It is not intended as medical advice for individual conditions or treatments  Talk to your doctor, nurse or pharmacist before following any medical regimen to see if it is safe and effective for you

## 2022-12-28 ENCOUNTER — ROUTINE PRENATAL (OUTPATIENT)
Dept: OBGYN CLINIC | Facility: CLINIC | Age: 32
End: 2022-12-28

## 2022-12-28 VITALS
DIASTOLIC BLOOD PRESSURE: 87 MMHG | HEART RATE: 104 BPM | SYSTOLIC BLOOD PRESSURE: 134 MMHG | BODY MASS INDEX: 31.95 KG/M2 | WEIGHT: 192 LBS

## 2022-12-28 DIAGNOSIS — Z3A.32 32 WEEKS GESTATION OF PREGNANCY: ICD-10-CM

## 2022-12-28 DIAGNOSIS — Z34.93 PRENATAL CARE IN THIRD TRIMESTER: Primary | ICD-10-CM

## 2022-12-28 DIAGNOSIS — K21.9 GASTRIC REFLUX: ICD-10-CM

## 2022-12-28 RX ORDER — OMEPRAZOLE 20 MG/1
20 TABLET, DELAYED RELEASE ORAL DAILY
Qty: 30 TABLET | Refills: 3 | Status: SHIPPED | OUTPATIENT
Start: 2022-12-28

## 2022-12-28 NOTE — PATIENT INSTRUCTIONS
The Third Trimester  (28-42 weeks)  YOUR BABY   * your baby sucks its thumb now! * your baby can hear voices and respond to touch…   so talk to him or her!!   * your baby’s brain grows and develops most in the last 2 months of pregnancy   * baby’s head and bones are soft and flexible so they can fit through the birth canal   * baby’s movements change towards the end of pregnancy because there is less room for kicking and stretching in your belly   * baby’s lungs are not fully developed and completely ready to breathe on their own until the last 3-4 weeks before your due date    YOUR BODY   * your belly is growing a lot now   * it may become more difficult to sleep well at night or to be as active as you usually are   * you may sweat more than usual   * you will become more off-balance……be careful not to fall!!   * you may develop hemorrhoids (which can be painful and make it difficult to sit down)   * the last two months of pregnancy can become very uncomfortable……with backaches, headaches, and heartburn   * you can start to have contractions……  as long as they are irregular and less than 5 per hour, this is a normal part of your body getting ready to have a baby   * your cervix may start to thin out and open up……to get ready for delivery   * you may find yourself needing to “pee” very often……  because baby is pressing on your bladder so much   * you may get out of breathe more quickly than usual      FETAL KICK COUNTS    In the third trimester (after 28 weeks gestation) you should be performing fetal kick counts every day  Your baby should move at least 10 times in 2 hours during an active time, once a day  Choose atime of day when your baby is most active  Try to do this around the same time each day  Get into a comfortable position and then write down the time your baby first moves  Count each movement until the baby moves 10 times  These movements include kicks, punches, nudges, flutters, or rolls    This can take anywhere from 5 minutes to 2 hours  Write down the time you feel the baby's 10th movement  If 2 hours has passed and your baby has not moved at least 10 times, you should CALL THE OFFICE RIGHT AWAY  [phone]  PREMATURE LABOR     When to call [phone]:  * I need to call immediately if I have even a small amount of LIQUID leaking from my vagina, with or without contractions  * I need to call if I am BLEEDING from my vagina  * I need to call if I am feeling CRAMPING that continues after drinking 2-3 glasses of water and lying down on my side for one hour and that feels like I am having a period  * I need to call if I feel CONTRACTIONS  more than 4 times in an hour that feels like the baby is “balling up” even after I try drinking 2-3 glasses of water and lying down on my side for an hour  * I need to call if I notice a change in my vaginal DISCHARGE  * I need to call if I am feeling PELVIC PRESSURE  that feels like the baby is pushing down into my vagina and lasts more than an hour  * I need to call if I have LOW BACKACHE which is new and near my tailbone  It may either come and go several times during an hour or stay there constantly  PRE-ECLAMPSIA     What is it? Pre-eclampsia is a serious disease that can occur during pregnancy related to high blood pressures  It can happen to any woman  Why should I care? Women who develop pre-eclampsia have serious risks which can include seizures, stroke, organ damage, premature birth of their baby  In the very worst cases, it can cause death of the mother and/or their baby  What should I pay attention to?    Signs and symptoms of pre-eclampsia can include:   * Severe swelling of face or hands    * A headache that will not go away even after you have taken Tylenol   * Seeing spots or changes in eyesight    * Pain in the upper abdomen or shoulder    * New nausea and vomiting (in the second half of pregnancy)    * Sudden weight gain    * Difficulty breathing     What should I do? If you experience any of the above symptoms of pre-eclampsia, contact your OB provider  Finding pre-eclampsia early is important for you and your baby  Call us at [phone]  [facility] BREASTFEEDING     BENEFITS FOR BABIES   * stronger immune systems (less allergies, eczema, asthma, and childhood cancers)   * less diarrhea and constipation or other GI diseases   * fewer colds and ear infections   * better vision and teeth (fewer cavities)   * improves IQ   * lower rates of diabetes and obesity in childhood     BENEFITS FOR MOMS   * promotes faster weight loss after delivery   * lower risk for postpartum depression   * lower risk for breast, uterine, and ovarian cancers   * lower risk for osteoporosis developing with age   * easier than formula - is always right with you, clean, and the right temperature   * less expensive than formula……it’s FREE !!!!     KEYS TO SUCCESSFUL BREASTFEEDING   * keep baby skin-to-skin until after first feeding event   * keep baby in your room with you during your hospital stay after delivery   * avoid any bottle feedings (unless medically necessary)   * limit the use of pacifiers and swaddling   * ask for help if you are having any issues……lactation consultants (who specialize in breastfeeding) are available to help you   * a healthy diet for mom……eating a variety of foods and portions in moderation    THINGS YOU SHOULD KNOW ABOUT BREASTFEEDING   * most medications are considered compatible with breastfeeding by the [address] [place] [address] Academy of Pediatrics, but you should check with your health care provider or lactation consultant prior to taking a new medication……just to be sure it is safe   * alcohol (beer, wine, liquor) can be passed from mother to baby through breast milk……an occasional, social drink is deemed acceptable by the American Academy of [facility]   more than that should be avoided   * breastfeeding is NOT an effective method of birth control   * nicotine (in cigarettes) can pass from mother to baby through breast milk…   however, for mothers who smoke, it is still healthier to breastfeed than use formula   * caffeine should be limited to 1-3 cups per day……includes coffee, soda, energy drinks         PERINEAL / VAGINAL MASSAGE    What can I do now to decrease my chances of tearing during delivery? Massaging around the vaginal opening by you (or your partner), either antepartum (before birth) or during the second stage of labor, can reduce the likelihood of perineal tearing during childbirth  Likewise, the use of warm packs held on the perineum during the pushing stage of labor can reduce the severity of your tear  This will happen during the pushing stage of labor  At home, you can also help reduce the chances of injury that may occur during the birth of your child through perineal massage  When should I do this? Starting around or shortly after 34 weeks of pregnancy, you or your partner should provide 5-10 minutes of vaginal massage 1-4 times per week  How? Use either almond, coconut, or olive oil and water mixture on 1 or 2 fingers (depending on comfort)  Insert finger(s) 3-5cm into the vagina  Apply sweeping downward/sideward pressure from 3 to 9 o'clock for 5-10 minutes, 1-4 times per week  WARNING SIGNS DURING PREGNANCY  Call our office at [phone] if you experience any of the followin  Vaginal bleeding  2  Sharp abdominal pain that does not go away  3  Fever (more than 100 4 and is not relieved by Tylenol)  4  Persistent vomiting lasting greater than 24 hours  5  Chest pain   6  Pain or burning when you urinate  7  Severe headache that doesn't resolve with Tylenol  8  Blurred vision or seeing spots in your vision  9  Sudden swelling of your face or hands  10  Redness, swelling or pain in a leg  11  A sudden weight gain in just a few days  12   Decrease in your baby's movement (after 28 weeks or the 6th month of pregnancy)  13  A loss of watery fluid from your vagina - can be a gush, a trickle or continuous wetness  14  After 20 weeks of pregnancy, rhythmic cramping (greater than 4 per hour) or menstrual like low/pelvic pain          VACCINES IN PREGNANCY    TDAP  Whooping cough (or pertussis) can be serious for anyone, but for your , it can be life-threatning  Up to 20 babies die each year in the [place]  Due to whooping cough  About half of babies younger than 3year old who get whooping cough need treatment in the hospital   The younger the baby is when he or she gets whooping cough, the more likely he or she will need to be treated in a hospital   When you receive the whooping cough vaccine (Tdap) during your pregnancy, your body will create protective antibodies and pass some of them to your baby before birth  These antibodies can help protect your baby from getting whooping cough until they are old enough to be vaccinated themselves (usually around 7 months of age)  INFLUENZA  Changes in your immune, heart, and lung functions during pregnancy make you more likely to get seriously ill from the flu  Catching the flu also increases your chances for serious problems for your developing baby, including premature labor and delivery  It is recommended that all women who are pregnant during flu season should receive an influenza vaccine  Coronavirus (OCCCC-97) pregnancy FAQs: Medical experts answer your questions  From [employer] cy  com/0_coronavirus-covid-19-pregnancy-faq-medical-carwrxw-jgompe-hh_68962438  bc (courtesy of [facility])  As of 3/18/20  By Elisa Galvez 41  Medically reviewed by Society for Maternal-Fetal Medicine       We asked parents-to-be to send us their most pressing questions about coronavirus (COVID-19)  Among them: Is it still safe to deliver in a hospital? What if my ob-gyn has the virus?  We sent those questions to the top docs at the [facility]  Here are their answers  The coronavirus (COVID-19) pandemic has been declared a national emergency in the [facility] by [employer]  Moms-to-be like you are concerned about everything from getting medicines to managing disruptions at work  But above and beyond any worry about lifestyle changes is a focus on your health and the impact of COVID-19 on your pregnancy  We asked obstetrics doctors who handle the most complicated pregnancy issues to answer your questions about the coronavirus  Here are their responses, provided by Dr Gerhard Ge and her colleagues at the [facility]  Am I at more risk for COVID-19 if I'm pregnant? We don't know  Pregnancy does change your immune system in ways that might make you more susceptible to viral respiratory infections like COVID-19  If you become infected, you might also be at higher risk for more severe illness compared to the general population  Although this does not appear to be the case with COVID-19, based on limited data so far, a higher risk has been true for pregnant women with other coronavirus infections (SARS-CoV and MERS-CoV) and other viral respiratory infections, such as flu  It's important to take preventive actions to avoid infection, such as washing your hands often and avoiding people who are sick  How might coronavirus affect my pregnancy? Again, we don't know  Women with other coronavirus infections (such as SARS-CoV) did not have miscarriage or stillbirth at higher rates than the general population  We know that having other respiratory viral infections during pregnancy, such as flu, has been associated with problems like low birth weight and  birth  Also, having a high fever early in pregnancy may increase the risk of certain birth defects  Could I transmit coronavirus to my baby during pregnancy or delivery?   We don't know whether you could transmit COVID-19 to your baby before or during delivery  However, among the few case studies of infants born to mothers with COVID-23 published in peer-reviewed literature, none of the infants tested positive for the virus  Also, there was no virus detected in samples of amniotic fluid or breast milk  There have been a few reports of newborns as young as a few days old with infection, suggesting that a mother can transmit the infection to her infant through close contact after delivery  There have been no reports of mother-to-baby transmission for other coronaviruses (MERS-CoV and SARS-CoV), although only limited information is available  Is it safe for me to deliver at a hospital where there have been COVID-19 cases? Yes  We know that COVID-19 is a very scary virus  The good news is that hospitals are taking great precautions to keep patients and healthcare providers safe  When a patient is even suspected to have COVID-19, they are placed in a negative pressure room  (Think of these rooms as vacuums that suck and filter the air so it's safe for the other people in the hospital)  This makes it possible for you to deliver at the hospital without putting you or your baby at risk  Hospitals are also implementing stricter visiting policies to keep patients safe  It's worth calling your hospital to check if there are any new regulations to be aware of  What plans should I make now in case the hospital system is overwhelmed when it's time for me to deliver? This is a great question to talk with your doctor or midwife about when you're 34 to 36 weeks pregnant  Every hospital is making different plans for dealing with this scenario  I work in healthcare  Should I ask my doctor to excuse me from work until the baby is born? What if I work in a school, the travel [address], or some other high-risk setting?   Healthcare facilities should take care to limit the exposure of pregnant employees to patients with confirmed or suspected COVID-19, just as they would with other infectious cases  If you continue working, be sure to follow the CDC's risk assessment and infection control guidelines  If you work in a school, travel [employer], or other high-risk setting, talk with your employer about what it's doing to protect employees and minimize infection risks  Wash your hands often  What if my OB gets COVID-19? If your doctor or midwife tests positive for COVID-19, they will need to be quarantined until they recover and are no longer at risk of transmitting the virus  In this case, you'll be assigned to another OB in your doctor's practice (or you may choose another practitioner yourself)  Ask your new OB or your doctor's office if you should self-quarantine or be tested for the virus  (It will depend on when you last saw your provider and when that person tested positive )    Should we hold off on trying to conceive because of COVID-19? At this time, there's no reason to hold off on trying to get pregnant, but the data we have is really limited  For example, we don't think the virus causes birth defects or increases your risk of miscarriage  But we don't know whether you could transmit COVID-19 to your baby before or during delivery  We also don't know if the virus lives in semen or can be sexually transmitted  We have a babymoon scheduled in the next few months - should we cancel? If you're planning to travel internationally or on a cruise, you should strongly consider canceling  At this time, the virus has reached more than 140 countries, and there are travel bans to [place], most of [place], and [place]  Places where large numbers of people gather are at highest risk, especially airports and cruise ships  If you're planning travel in the [place] , note that any travel setting increases your risk of exposure, and there may be travel bans even in [place] by the time you're scheduled to go   Even if you're state is not blocked, you'll definitely want to avoid traveling to communities where the virus is spreading  To find out where these places are, check The [place] Times map based on CDC data  For the most current advice to help you avoid exposure, check the CDC's COVID-19 travel page  Will the hospital separate me from my  and keep the baby in quarantine? If you test positive for COVID-19 or have been exposed but have no symptoms, the CDC, [employer] of Obstetricians and Gynecologists, and the Society for [place] 250 all recommend that you be  from your baby to decrease the risk of transmission to the baby  This would last until you are no longer at risk of transmitting the virus  If you do not have COVID-19 and have not been exposed to the virus, the hospital will not separate you from your   My hospital is restricting visitors and only allowing one support person  If my support person leaves after the delivery, will they be allowed to come back? Every hospital has different policies  Contact your hospital or labor and delivery unit a week or so before delivery to get the most up-to-date restrictions  In general, if your support person needs to leave, they would be allowed back unless they knew they were exposed to COVID-19 after leaving your company  [facility] understands that the coronavirus (COVID-19) pandemic is an evolving story and that your questions will change over time  We'll continue asking moms and dads in our Community what they want to know, and we'll get the answers from experts to keep them - and you - informed and supported  My mom was planning to fly here to help me care for my new baby after delivery  Should I tell her not to come? Yes  If your mom is over 61 or has any serious chronic medical conditions (such as heart disease, lung disease, or diabetes), she is at higher risk of serious illness from COVID-19 and should avoid air travel   And remember that any travel setting increases a person's risk of exposure  So, it may be risky to have her around the baby after she has been traveling  For the most current advice on traveling, check the CDC's COVID-19 travel page  [facility] understands that the coronavirus pandemic is an evolving story and that your questions will change over time  We'll continue asking moms and dads in our Community what they want to know, and we'll get the answers from experts to keep them - and you - informed and supported       Take Omeprazole as directed  Return in 2 weeks
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
## 2023-01-04 PROCEDURE — T1002 RN SERVICES UP TO 15 MINUTES: HCPCS | Performed by: REGISTERED NURSE

## 2023-01-10 ENCOUNTER — PATIENT OUTREACH (OUTPATIENT)
Dept: OBGYN CLINIC | Facility: CLINIC | Age: 33
End: 2023-01-10

## 2023-01-10 ENCOUNTER — ROUTINE PRENATAL (OUTPATIENT)
Dept: OBGYN CLINIC | Facility: CLINIC | Age: 33
End: 2023-01-10

## 2023-01-10 VITALS
SYSTOLIC BLOOD PRESSURE: 121 MMHG | BODY MASS INDEX: 32.42 KG/M2 | DIASTOLIC BLOOD PRESSURE: 81 MMHG | HEART RATE: 99 BPM | HEIGHT: 65 IN | WEIGHT: 194.6 LBS

## 2023-01-10 DIAGNOSIS — Z34.93 PRENATAL CARE IN THIRD TRIMESTER: ICD-10-CM

## 2023-01-10 PROBLEM — Z3A.34 34 WEEKS GESTATION OF PREGNANCY: Status: ACTIVE | Noted: 2022-09-13

## 2023-01-10 RX ORDER — ONDANSETRON 4 MG/1
4 TABLET, ORALLY DISINTEGRATING ORAL EVERY 6 HOURS PRN
Qty: 20 TABLET | Refills: 1 | Status: SHIPPED | OUTPATIENT
Start: 2023-01-10

## 2023-01-10 NOTE — PROGRESS NOTES
EDISON NYE saw the patient today, confirmed with her that I spoke with HR and that she would not be eligible for Short Term Disability services  Pt reports things are going well, she moved back in with her partner (same sex, not FOB) reports relationship is going well, has not heard from FOB much, pt in a safe supportive household at this time  Her partner works for [employer] and is able to have 18-26wks parental leave, they do not have to be  to utilize this service  Baby shower is this weekend  EDISON NYE will remain available as needed, will plan to see the patient post partum
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
## 2023-01-15 ENCOUNTER — NURSE TRIAGE (OUTPATIENT)
Dept: OTHER | Facility: OTHER | Age: 33
End: 2023-01-15

## 2023-01-16 NOTE — TELEPHONE ENCOUNTER
Regarding: labor symptoms  ----- Message from Kimberlee Aviles sent at 1/15/2023 11:47 PM EST -----  Pt called, " I am 34 weeks pregnant and I am experiencing a lot of consistent cramping for the past hour "

## 2023-01-16 NOTE — TELEPHONE ENCOUNTER
Reason for Disposition  • MODERATE-SEVERE abdominal pain (e g , interferes with normal activities, awakens from sleep)    Answer Assessment - Initial Assessment Questions  1  LOCATION: "Where does it hurt?"       All over     2  RADIATION: "Does the pain shoot anywhere else?" (e g , chest, back)      Denies    3  ONSET: "When did the pain begin?" (Minutes, hours or days ago)       At 2200    4  ONSET: "Gradual or sudden onset?"      Gradual     5  PATTERN: "Does the pain come and go, or has it been constant since it started?"       Constant     6  SEVERITY: "How bad is the pain?" "What does it keep you from doing?"  (e g , Scale 1-10; mild, moderate, or severe)    - MILD (1-3): doesn't interfere with normal activities, abdomen soft and not tender to touch     - MODERATE (4-7): interferes with normal activities or awakens from sleep, tender to touch     - SEVERE (8-10): excruciating pain, doubled over, unable to do any normal activities      8/10    7  RECURRENT SYMPTOM: "Have you ever had this type of stomach pain before?" If Yes, ask: "When was the last time?" and "What happened that time?"       Denies    8  CAUSE: "What do you think is causing the stomach pain? Unknown     9  RELIEVING/AGGRAVATING FACTORS: "What makes it better or worse?" (e g , antacids, bowel movement, movement)      Nothing makes it better    10  FETAL MOVEMENT: "Has the baby's movement decreased or changed significantly from normal?"        Denies    11  OTHER SYMPTOMS: "Has there been any vaginal bleeding, fever, vomiting, diarrhea, or urine problems?"        Denies    12   ADRIANNA: "What date are you expecting to deliver?"        1/7/22    Has tried heating pad, sleeping and elevation in bed with no relief    Protocols used: PREGNANCY - ABDOMINAL PAIN GREATER THAN 20 WEEKS EGA-ADULT-

## 2023-01-24 ENCOUNTER — ROUTINE PRENATAL (OUTPATIENT)
Dept: OBGYN CLINIC | Facility: CLINIC | Age: 33
End: 2023-01-24

## 2023-01-24 VITALS
BODY MASS INDEX: 32.55 KG/M2 | WEIGHT: 195.6 LBS | DIASTOLIC BLOOD PRESSURE: 71 MMHG | HEART RATE: 95 BPM | SYSTOLIC BLOOD PRESSURE: 125 MMHG

## 2023-01-24 DIAGNOSIS — Z91.419: ICD-10-CM

## 2023-01-24 DIAGNOSIS — R20.2 TINGLING: ICD-10-CM

## 2023-01-24 DIAGNOSIS — R11.0 NAUSEA: ICD-10-CM

## 2023-01-24 DIAGNOSIS — Z3A.36 36 WEEKS GESTATION OF PREGNANCY: ICD-10-CM

## 2023-01-24 DIAGNOSIS — Z34.93 PRENATAL CARE IN THIRD TRIMESTER: Primary | ICD-10-CM
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
## 2023-01-25 PROBLEM — Z3A.36 36 WEEKS GESTATION OF PREGNANCY: Status: ACTIVE | Noted: 2022-09-13

## 2023-01-26 LAB — GP B STREP DNA SPEC QL NAA+PROBE: POSITIVE

## 2023-01-30 ENCOUNTER — NURSE TRIAGE (OUTPATIENT)
Dept: OTHER | Facility: OTHER | Age: 33
End: 2023-01-30

## 2023-01-30 NOTE — TELEPHONE ENCOUNTER
Tiger connect message sent to the on call provider regarding patient's symptoms  Per the on call provider, patient was advised to try a heating pad and tylenol for pain  Patient advised if contractions increase in frequency or if pain continues to go to [facility]  labor and delivery unit  Reason for Disposition  • MODERATE-SEVERE abdominal pain    Answer Assessment - Initial Assessment Questions  1  ONSET: "When did the symptoms begin?"        3:50am   2  CONTRACTIONS: "Describe the contractions that you are having " (e g , duration, frequency, regularity, severity)     10 minutes, 1 minute, "They are painful  My back and my butt were hurting "   3  ADRIANNA: "What date are you expecting to deliver?"      23   4  PARITY: "Have you had a baby before?" If Yes, ask: "How long did the labor last?"      Denies   5  FETAL MOVEMENT: "Has the baby's movement decreased or changed significantly from normal?"      Denies   6   OTHER SYMPTOMS: "Do you have any other symptoms?" (e g , leaking fluid from vagina, vaginal bleeding, fever, hand/facial swelling)      8/10 intermittent back pain    Protocols used: PREGNANCY - LABOR - -ADULT-

## 2023-02-01 ENCOUNTER — ROUTINE PRENATAL (OUTPATIENT)
Dept: OBGYN CLINIC | Facility: CLINIC | Age: 33
End: 2023-02-01

## 2023-02-01 VITALS — DIASTOLIC BLOOD PRESSURE: 65 MMHG | SYSTOLIC BLOOD PRESSURE: 126 MMHG | WEIGHT: 193 LBS | BODY MASS INDEX: 32.12 KG/M2

## 2023-02-01 DIAGNOSIS — Z34.93 PRENATAL CARE IN THIRD TRIMESTER: Primary | ICD-10-CM

## 2023-02-01 DIAGNOSIS — Z3A.37 37 WEEKS GESTATION OF PREGNANCY: ICD-10-CM

## 2023-02-01 NOTE — PATIENT INSTRUCTIONS
LABOR PRECAUTIONS  Call our office at [phone] for any of the following:    * I need to call immediately I if I have even a small amount of LIQUID  leaking from my vagina, with or without contractions  * I need to call if I am BLEEDING an amount equal to or more than a period  A small amount of bloody vaginal discharge is normal at the end of the pregnancy  * I need to call if I am having CONTRACTIONS  every five minutes for at least an hour  I will need a watch in order to time them  I should time them from the beginning of one contraction until the beginning of the next one  * I need to call BEFORE  I go to the hospital    * I need to have a plan for TRANSPORTATION  to get to the hospital when I am in labor  Labor is generally not an emergency which requires an ambulance  FETAL KICK COUNTS    In the third trimester (after 28 weeks gestation) you should be performing fetal kick counts every day  Your baby should move at least 10 times in 2 hours during an active time, once a day  Choose atime of day when your baby is most active  Try to do this around the same time each day  Get into a comfortable position and then write down the time your baby first moves  Count each movement until the baby moves 10 times  These movements include kicks, punches, nudges, flutters, or rolls  This can take anywhere from 5 minutes to 2 hours  Write down the time you feel the baby's 10th movement  If 2 hours has passed and your baby has not moved at least 10 times, you should CALL THE OFFICE RIGHT AWAY  [phone]        PERINEAL / VAGINAL MASSAGE    What can I do now to decrease my chances of tearing during delivery? Massaging around the vaginal opening by you (or your partner), either antepartum (before birth) or during the second stage of labor, can reduce the likelihood of perineal tearing during childbirth    Likewise, the use of warm packs held on the perineum during the pushing stage of labor can reduce the severity of your tear  This will happen during the pushing stage of labor  At home, you can also help reduce the chances of injury that may occur during the birth of your child through perineal massage  When should I do this? Starting around or shortly after 34 weeks of pregnancy, you or your partner should provide 5-10 minutes of vaginal massage 1-4 times per week  How? Use either almond, coconut, or olive oil and water mixture on 1 or 2 fingers (depending on comfort)  Insert finger(s) 3-5cm into the vagina  Apply sweeping downward/sideward pressure from 3 to 9 o'clock for 5-10 minutes, 1-4 times per week  GROUP B STREP    Group B Strep (GBS) is a common vaginal bacteria  Approximately 25% of women normally have GBS bacteria present in the vagina  It is NOT a sexually-transmitted infection  In fact, it is not an infection AT ALL! It is just a normal vaginal bacteria for many women  However, the GBS bacteria can be dangerous to a  baby if the baby is exposed to that particular bacteria during labor and birth AND develops an infection from it  The likelihood of a  GBS infection for a woman who has GBS bacteria in the vagina is about 1%-2%  But if it does occur, a baby could become severely ill  For this reason, we do a vaginal culture (Q-tip swab of the vagina and rectum) for ALL pregnant women at approximately 36 weeks of pregnancy  If the culture shows that there is GBS bacteria present, it is NOT a reason to panic! Because in this situation we will give this woman antibiotics through her IV while she is in labor  When a mother is treated with antibiotics during labor and delivery, her baby ALMOST NEVER becomes infected with GBS bacteria  Coronavirus (CQCUO-93) pregnancy FAQs: Medical experts answer your questions  From [employer] cy  com/0_coronavirus-covid-19-pregnancy-faq-medical-asvzvmm-tfbent-qx_[id number]  bc (courtesy of [facility])  As of 3/18/20  By Elisa Magallanes 39  Medically reviewed by Society for Maternal-Fetal Medicine       We asked parents-to-be to send us their most pressing questions about coronavirus (COVID-19)  Among them: Is it still safe to deliver in a hospital? What if my ob-gyn has the virus? We sent those questions to the top docs at the Society for Maternal-Fetal Medicine  Here are their answers  The coronavirus (COVID-19) pandemic has been declared a national emergency in the [facility] by [employer]  Moms-to-be like you are concerned about everything from getting medicines to managing disruptions at work  But above and beyond any worry about lifestyle changes is a focus on your health and the impact of COVID-19 on your pregnancy  We asked obstetrics doctors who handle the most complicated pregnancy issues to answer your questions about the coronavirus  Here are their responses, provided by Dr Omkar Cardona and her colleagues at the Society for [place] [employer]  Am I at more risk for COVID-19 if I'm pregnant? We don't know  Pregnancy does change your immune system in ways that might make you more susceptible to viral respiratory infections like COVID-19  If you become infected, you might also be at higher risk for more severe illness compared to the general population  Although this does not appear to be the case with COVID-19, based on limited data so far, a higher risk has been true for pregnant women with other coronavirus infections (SARS-CoV and MERS-CoV) and other viral respiratory infections, such as flu  It's important to take preventive actions to avoid infection, such as washing your hands often and avoiding people who are sick  How might coronavirus affect my pregnancy? Again, we don't know  Women with other coronavirus infections (such as SARS-CoV) did not have miscarriage or stillbirth at higher rates than the general population    We know that having other respiratory viral infections during pregnancy, such as flu, has been associated with problems like low birth weight and  birth  Also, having a high fever early in pregnancy may increase the risk of certain birth defects  Could I transmit coronavirus to my baby during pregnancy or delivery? We don't know whether you could transmit COVID-19 to your baby before or during delivery  However, among the few case studies of infants born to mothers with COVID-23 published in peer-reviewed literature, none of the infants tested positive for the virus  Also, there was no virus detected in samples of amniotic fluid or breast milk  There have been a few reports of newborns as young as a few days old with infection, suggesting that a mother can transmit the infection to her infant through close contact after delivery  There have been no reports of mother-to-baby transmission for other coronaviruses (MERS-CoV and SARS-CoV), although only limited information is available  Is it safe for me to deliver at a hospital where there have been COVID-19 cases? Yes  We know that COVID-19 is a very scary virus  The good news is that hospitals are taking great precautions to keep patients and healthcare providers safe  When a patient is even suspected to have COVID-19, they are placed in a negative pressure room  (Think of these rooms as vacuums that suck and filter the air so it's safe for the other people in the hospital)  This makes it possible for you to deliver at the hospital without putting you or your baby at risk  Hospitals are also implementing stricter visiting policies to keep patients safe  It's worth calling your hospital to check if there are any new regulations to be aware of  What plans should I make now in case the hospital system is overwhelmed when it's time for me to deliver? This is a great question to talk with your doctor or midwife about when you're 34 to 36 weeks pregnant   Every hospital is making different plans for dealing with this scenario  I work in healthcare  Should I ask my doctor to excuse me from work until the baby is born? What if I work in a school, the travel [employer], or some other high-risk setting? Healthcare facilities should take care to limit the exposure of pregnant employees to patients with confirmed or suspected COVID-19, just as they would with other infectious cases  If you continue working, be sure to follow the CDC's risk assessment and infection control guidelines  If you work in a school, travel [employer], or other high-risk setting, talk with your employer about what it's doing to protect employees and minimize infection risks  Wash your hands often  What if my OB gets COVID-19? If your doctor or midwife tests positive for COVID-19, they will need to be quarantined until they recover and are no longer at risk of transmitting the virus  In this case, you'll be assigned to another OB in your doctor's practice (or you may choose another practitioner yourself)  Ask your new OB or your doctor's office if you should self-quarantine or be tested for the virus  (It will depend on when you last saw your provider and when that person tested positive )    Should we hold off on trying to conceive because of COVID-19? At this time, there's no reason to hold off on trying to get pregnant, but the data we have is really limited  For example, we don't think the virus causes birth defects or increases your risk of miscarriage  But we don't know whether you could transmit COVID-19 to your baby before or during delivery  We also don't know if the virus lives in semen or can be sexually transmitted  We have a babymoon scheduled in the next few months - should we cancel? If you're planning to travel internationally or on a cruise, you should strongly consider canceling   At this time, the virus has reached more than 140 countries, and there are travel bans to [place], most of [place], and [place]  Places where large numbers of people gather are at highest risk, especially airports and cruise ships  If you're planning travel in the [place] , note that any travel setting increases your risk of exposure, and there may be travel bans even in [place] by the time you're scheduled to go  Even if you're state is not blocked, you'll definitely want to avoid traveling to communities where the virus is spreading  To find out where these places are, check The [place] Times map based on CDC data  For the most current advice to help you avoid exposure, check the CDC's COVID-19 travel page  Will the hospital separate me from my  and keep the baby in quarantine? If you test positive for COVID-19 or have been exposed but have no symptoms, the CDC, [employer] of Obstetricians and Gynecologists, and the Society for [place] [employer] all recommend that you be  from your baby to decrease the risk of transmission to the baby  This would last until you are no longer at risk of transmitting the virus  If you do not have COVID-19 and have not been exposed to the virus, the hospital will not separate you from your   My hospital is restricting visitors and only allowing one support person  If my support person leaves after the delivery, will they be allowed to come back? Every hospital has different policies  Contact your hospital or labor and delivery unit a week or so before delivery to get the most up-to-date restrictions  In general, if your support person needs to leave, they would be allowed back unless they knew they were exposed to COVID-19 after leaving your company  [facility] understands that the coronavirus (COVID-19) pandemic is an evolving story and that your questions will change over time  We'll continue asking moms and dads in our Community what they want to know, and we'll get the answers from experts to keep them - and you - informed and supported      My mom was planning to fly here to help me care for my new baby after delivery  Should I tell her not to come? Yes  If your mom is over 61 or has any serious chronic medical conditions (such as heart disease, lung disease, or diabetes), she is at higher risk of serious illness from COVID-19 and should avoid air travel  And remember that any travel setting increases a person's risk of exposure  So, it may be risky to have her around the baby after she has been traveling  For the most current advice on traveling, check the [facility]'s COVID-19 travel page  [facility] understands that the coronavirus pandemic is an evolving story and that your questions will change over time  We'll continue asking moms and dads in our Community what they want to know, and we'll get the answers from experts to keep them - and you - informed and supported       Return in 1 week
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
## 2023-02-07 ENCOUNTER — ROUTINE PRENATAL (OUTPATIENT)
Dept: OBGYN CLINIC | Facility: CLINIC | Age: 33
End: 2023-02-07

## 2023-02-07 VITALS
SYSTOLIC BLOOD PRESSURE: 114 MMHG | DIASTOLIC BLOOD PRESSURE: 78 MMHG | HEART RATE: 87 BPM | BODY MASS INDEX: 33.12 KG/M2 | WEIGHT: 199 LBS

## 2023-02-07 DIAGNOSIS — Z34.91 PRENATAL CARE IN FIRST TRIMESTER: ICD-10-CM

## 2023-02-07 DIAGNOSIS — Z91.419: ICD-10-CM

## 2023-02-07 DIAGNOSIS — O99.013 ANEMIA AFFECTING PREGNANCY IN THIRD TRIMESTER: ICD-10-CM

## 2023-02-07 DIAGNOSIS — Z3A.38 38 WEEKS GESTATION OF PREGNANCY: ICD-10-CM

## 2023-02-07 DIAGNOSIS — R87.610 ASCUS OF CERVIX WITH NEGATIVE HIGH RISK HPV: Primary | ICD-10-CM

## 2023-02-07 DIAGNOSIS — Z34.93 PRENATAL CARE IN THIRD TRIMESTER: ICD-10-CM

## 2023-02-07 RX ORDER — DOCUSATE SODIUM 100 MG/1
100 CAPSULE, LIQUID FILLED ORAL 2 TIMES DAILY PRN
Qty: 60 CAPSULE | Refills: 2 | Status: SHIPPED | OUTPATIENT
Start: 2023-02-07

## 2023-02-07 RX ORDER — FERROUS SULFATE TAB EC 324 MG (65 MG FE EQUIVALENT) 324 (65 FE) MG
324 TABLET DELAYED RESPONSE ORAL
Qty: 60 TABLET | Refills: 0 | Status: SHIPPED | OUTPATIENT
Start: 2023-02-07
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
## 2023-02-09 PROCEDURE — T1002 RN SERVICES UP TO 15 MINUTES: HCPCS | Performed by: REGISTERED NURSE

## 2023-02-10 ENCOUNTER — NURSE TRIAGE (OUTPATIENT)
Dept: OTHER | Facility: OTHER | Age: 33
End: 2023-02-10

## 2023-02-10 ENCOUNTER — ANESTHESIA EVENT (INPATIENT)
Dept: ANESTHESIOLOGY | Facility: HOSPITAL | Age: 33
End: 2023-02-10

## 2023-02-10 ENCOUNTER — HOSPITAL ENCOUNTER (INPATIENT)
Facility: HOSPITAL | Age: 33
LOS: 2 days | Discharge: HOME | End: 2023-02-12
Attending: OBSTETRICS & GYNECOLOGY | Admitting: OBSTETRICS & GYNECOLOGY

## 2023-02-10 ENCOUNTER — ANESTHESIA (INPATIENT)
Dept: ANESTHESIOLOGY | Facility: HOSPITAL | Age: 33
End: 2023-02-10

## 2023-02-10 PROBLEM — O13.3 GESTATIONAL HYPERTENSION, THIRD TRIMESTER: Status: ACTIVE | Noted: 2023-02-10

## 2023-02-10 PROBLEM — I10 CHRONIC HYPERTENSION: Status: ACTIVE | Noted: 2023-02-10

## 2023-02-10 PROBLEM — O14.90 PRE-ECLAMPSIA, ANTEPARTUM: Status: ACTIVE | Noted: 2023-02-10

## 2023-02-10 LAB
ABO GROUP BLD: NORMAL
ALBUMIN SERPL BCP-MCNC: 3.8 G/DL (ref 3.5–5)
ALP SERPL-CCNC: 183 U/L (ref 34–104)
ALT SERPL W P-5'-P-CCNC: 23 U/L (ref 7–52)
ANION GAP SERPL CALCULATED.3IONS-SCNC: 9 MMOL/L (ref 4–13)
AST SERPL W P-5'-P-CCNC: 18 U/L (ref 13–39)
BASE EXCESS BLDCOA CALC-SCNC: -5.5 MMOL/L (ref 3–11)
BASE EXCESS BLDCOV CALC-SCNC: -2.2 MMOL/L (ref 1–9)
BILIRUB SERPL-MCNC: 0.77 MG/DL (ref 0.2–1)
BLD GP AB SCN SERPL QL: NEGATIVE
BUN SERPL-MCNC: 8 MG/DL (ref 5–25)
CALCIUM SERPL-MCNC: 9.2 MG/DL (ref 8.4–10.2)
CHLORIDE SERPL-SCNC: 101 MMOL/L (ref 96–108)
CO2 SERPL-SCNC: 24 MMOL/L (ref 21–32)
CREAT SERPL-MCNC: 0.65 MG/DL (ref 0.6–1.3)
CREAT UR-MCNC: 49.9 MG/DL
ERYTHROCYTE [DISTWIDTH] IN BLOOD BY AUTOMATED COUNT: 12.2 % (ref 11.6–15.1)
GFR SERPL CREATININE-BSD FRML MDRD: 119 ML/MIN/1.73SQ M
GLUCOSE SERPL-MCNC: 100 MG/DL (ref 65–140)
HCO3 BLDCOA-SCNC: 20.3 MMOL/L (ref 17.3–27.3)
HCO3 BLDCOV-SCNC: 23.7 MMOL/L (ref 12.2–28.6)
HCT VFR BLD AUTO: 41.2 % (ref 34.8–46.1)
HGB BLD-MCNC: 13.4 G/DL (ref 11.5–15.4)
MCH RBC QN AUTO: 29.5 PG (ref 26.8–34.3)
MCHC RBC AUTO-ENTMCNC: 32.5 G/DL (ref 31.4–37.4)
MCV RBC AUTO: 91 FL (ref 82–98)
O2 CT VFR BLDCOA CALC: 13.3 ML/DL
OXYHGB MFR BLDCOA: 63.3 %
OXYHGB MFR BLDCOV: 72.5 %
PCO2 BLDCOA: 40.6 MM[HG] (ref 30–60)
PCO2 BLDCOV: 44.7 MM HG (ref 27–43)
PH BLDCOA: 7.32 [PH] (ref 7.23–7.43)
PH BLDCOV: 7.34 [PH] (ref 7.19–7.49)
PLATELET # BLD AUTO: 311 THOUSANDS/UL (ref 149–390)
PMV BLD AUTO: 10.3 FL (ref 8.9–12.7)
PO2 BLDCOA: 26.7 MM HG (ref 5–25)
PO2 BLDCOV: 29.3 MM HG (ref 15–45)
POTASSIUM SERPL-SCNC: 3.9 MMOL/L (ref 3.5–5.3)
PROT SERPL-MCNC: 7.4 G/DL (ref 6.4–8.4)
PROT UR-MCNC: 22 MG/DL
PROT/CREAT UR: 0.44 MG/G{CREAT} (ref 0–0.1)
RBC # BLD AUTO: 4.54 MILLION/UL (ref 3.81–5.12)
RH BLD: POSITIVE
SAO2 % BLDCOV: 16.2 ML/DL
SODIUM SERPL-SCNC: 134 MMOL/L (ref 135–147)
SPECIMEN EXPIRATION DATE: NORMAL
WBC # BLD AUTO: 14.6 THOUSAND/UL (ref 4.31–10.16)

## 2023-02-10 PROCEDURE — 0HQ9XZZ REPAIR PERINEUM SKIN, EXTERNAL APPROACH: ICD-10-PCS | Performed by: OBSTETRICS & GYNECOLOGY

## 2023-02-10 PROCEDURE — 4A1HXCZ MONITORING OF PRODUCTS OF CONCEPTION, CARDIAC RATE, EXTERNAL APPROACH: ICD-10-PCS | Performed by: OBSTETRICS & GYNECOLOGY

## 2023-02-10 RX ORDER — CALCIUM CARBONATE 200(500)MG
1000 TABLET,CHEWABLE ORAL 3 TIMES DAILY PRN
Status: DISCONTINUED | OUTPATIENT
Start: 2023-02-10 | End: 2023-02-12 | Stop reason: HOSPADM

## 2023-02-10 RX ORDER — ROPIVACAINE HYDROCHLORIDE 2 MG/ML
INJECTION, SOLUTION EPIDURAL; INFILTRATION; PERINEURAL CONTINUOUS PRN
Status: DISCONTINUED | OUTPATIENT
Start: 2023-02-10 | End: 2023-02-11 | Stop reason: HOSPADM

## 2023-02-10 RX ORDER — IBUPROFEN 600 MG/1
600 TABLET ORAL EVERY 6 HOURS
Status: DISCONTINUED | OUTPATIENT
Start: 2023-02-10 | End: 2023-02-12 | Stop reason: HOSPADM

## 2023-02-10 RX ORDER — SODIUM CHLORIDE, SODIUM LACTATE, POTASSIUM CHLORIDE, CALCIUM CHLORIDE 600; 310; 30; 20 MG/100ML; MG/100ML; MG/100ML; MG/100ML
125 INJECTION, SOLUTION INTRAVENOUS CONTINUOUS
Status: DISCONTINUED | OUTPATIENT
Start: 2023-02-10 | End: 2023-02-12 | Stop reason: HOSPADM

## 2023-02-10 RX ORDER — OXYTOCIN/RINGER'S LACTATE 30/500 ML
250 PLASTIC BAG, INJECTION (ML) INTRAVENOUS ONCE
Status: COMPLETED | OUTPATIENT
Start: 2023-02-10 | End: 2023-02-10

## 2023-02-10 RX ORDER — ONDANSETRON 2 MG/ML
4 INJECTION INTRAMUSCULAR; INTRAVENOUS EVERY 8 HOURS PRN
Status: DISCONTINUED | OUTPATIENT
Start: 2023-02-10 | End: 2023-02-12 | Stop reason: HOSPADM

## 2023-02-10 RX ORDER — ECHINACEA PURPUREA EXTRACT 125 MG
1 TABLET ORAL
Status: DISCONTINUED | OUTPATIENT
Start: 2023-02-10 | End: 2023-02-12 | Stop reason: HOSPADM

## 2023-02-10 RX ORDER — ACETAMINOPHEN 325 MG/1
650 TABLET ORAL EVERY 4 HOURS PRN
Status: DISCONTINUED | OUTPATIENT
Start: 2023-02-10 | End: 2023-02-12 | Stop reason: HOSPADM

## 2023-02-10 RX ORDER — OXYTOCIN/RINGER'S LACTATE 30/500 ML
PLASTIC BAG, INJECTION (ML) INTRAVENOUS
Status: COMPLETED
Start: 2023-02-10 | End: 2023-02-10

## 2023-02-10 RX ORDER — ROPIVACAINE HYDROCHLORIDE 2 MG/ML
INJECTION, SOLUTION EPIDURAL; INFILTRATION; PERINEURAL AS NEEDED
Status: DISCONTINUED | OUTPATIENT
Start: 2023-02-10 | End: 2023-02-11 | Stop reason: HOSPADM

## 2023-02-10 RX ORDER — BUPIVACAINE HYDROCHLORIDE 2.5 MG/ML
30 INJECTION, SOLUTION EPIDURAL; INFILTRATION; INTRACAUDAL ONCE AS NEEDED
Status: DISCONTINUED | OUTPATIENT
Start: 2023-02-10 | End: 2023-02-12 | Stop reason: HOSPADM

## 2023-02-10 RX ORDER — DOCUSATE SODIUM 100 MG/1
100 CAPSULE, LIQUID FILLED ORAL 2 TIMES DAILY
Status: DISCONTINUED | OUTPATIENT
Start: 2023-02-10 | End: 2023-02-12 | Stop reason: HOSPADM

## 2023-02-10 RX ORDER — DIAPER,BRIEF,INFANT-TODD,DISP
1 EACH MISCELLANEOUS DAILY PRN
Status: DISCONTINUED | OUTPATIENT
Start: 2023-02-10 | End: 2023-02-12 | Stop reason: HOSPADM

## 2023-02-10 RX ADMIN — DOCUSATE SODIUM 100 MG: 100 CAPSULE, LIQUID FILLED ORAL at 16:58

## 2023-02-10 RX ADMIN — Medication 250 MILLI-UNITS/MIN: at 15:30

## 2023-02-10 RX ADMIN — WITCH HAZEL 1 PAD: 500 SOLUTION RECTAL; TOPICAL at 18:07

## 2023-02-10 RX ADMIN — SODIUM CHLORIDE, SODIUM LACTATE, POTASSIUM CHLORIDE, AND CALCIUM CHLORIDE 999 ML/HR: .6; .31; .03; .02 INJECTION, SOLUTION INTRAVENOUS at 09:38

## 2023-02-10 RX ADMIN — SALINE NASAL SPRAY 1 SPRAY: 1.5 SOLUTION NASAL at 22:29

## 2023-02-10 RX ADMIN — BENZOCAINE AND LEVOMENTHOL 1 APPLICATION.: 200; 5 SPRAY TOPICAL at 18:07

## 2023-02-10 RX ADMIN — IBUPROFEN 600 MG: 600 TABLET, FILM COATED ORAL at 16:58

## 2023-02-10 RX ADMIN — SODIUM CHLORIDE 5 MILLION UNITS: 0.9 INJECTION, SOLUTION INTRAVENOUS at 09:50

## 2023-02-10 RX ADMIN — ROPIVACAINE HYDROCHLORIDE 2 ML: 2 INJECTION EPIDURAL; INFILTRATION; PERINEURAL at 10:27

## 2023-02-10 RX ADMIN — IBUPROFEN 600 MG: 600 TABLET, FILM COATED ORAL at 22:29

## 2023-02-10 RX ADMIN — ROPIVACAINE HYDROCHLORIDE 5 ML: 2 INJECTION EPIDURAL; INFILTRATION; PERINEURAL at 10:25

## 2023-02-10 RX ADMIN — SODIUM CHLORIDE 2.5 MILLION UNITS: 9 INJECTION, SOLUTION INTRAVENOUS at 13:52

## 2023-02-10 RX ADMIN — ROPIVACAINE HYDROCHLORIDE: 2 INJECTION, SOLUTION EPIDURAL; INFILTRATION at 10:30

## 2023-02-10 RX ADMIN — ROPIVACAINE HYDROCHLORIDE 10 ML/HR: 2 INJECTION, SOLUTION EPIDURAL; INFILTRATION; PERINEURAL at 10:26

## 2023-02-10 NOTE — ANESTHESIA PROCEDURE NOTES
Epidural Block    Patient location during procedure: OB  Start time: 2/10/2023 10:23 AM  Reason for block: at surgeon's request and primary anesthetic  Staffing  Performed: Anesthesiologist   Anesthesiologist: Nellie Martinez MD  Preanesthetic Checklist  Completed: patient identified, IV checked, site marked, risks and benefits discussed, surgical consent, monitors and equipment checked, pre-op evaluation and timeout performed  Epidural  Patient position: sitting  Prep: Betadine  Patient monitoring: heart rate, continuous pulse ox and frequent blood pressure checks  Approach: midline  Location: lumbar  Injection technique: SERVANDO air  Needle  Needle type: Tuohy   Needle gauge: 17 G  Catheter type: side hole  Catheter size: 19 G  Catheter at skin depth: 11 cm  Catheter securement method: clear occlusive dressing  Test dose: negative  Assessment  Number of attempts: 1negative aspiration for CSF, negative aspiration for heme and no paresthesia on injection  patient tolerated the procedure well with no immediate complications

## 2023-02-10 NOTE — ASSESSMENT & PLAN NOTE
CBC/CMP wnl, PCr 8 75  Systolic (83SJQ), JCT:590 , Min:97 , LAE:882   Diastolic (38IER), LXI:72, Min:53, Max:71    Last SRBP on 2/10 at 1027, nonsustained

## 2023-02-10 NOTE — OB LABOR/OXYTOCIN SAFETY PROGRESS
Oxytocin Safety Progress Check Note - Michelle Mater 29 y o  female MRN: [id number]    Unit/Bed#: L&D 325-01 Encounter: [id number]       Contraction Frequency (minutes): 1-5  Contraction Quality: Strong  Tachysystole: No   Cervical Dilation: 9        Cervical Effacement: 100  Fetal Station: 2  Baseline Rate: 130 bpm  Fetal Heart Rate: 130 BPM                  Vital Signs:   Vitals:    02/10/23 0954   BP: (!) 170/103   Pulse: 65   Resp:    Temp:        Notes/comments:     Active labor   Plan for      Nano Onofre MD 2/10/2023 11:03 AM
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
## 2023-02-10 NOTE — DISCHARGE SUMMARY
Discharge Summary - OB/GYN   Samantha Beth 29 y o  female MRN: [id number]  Unit/Bed#: &D 325-01 Encounter: [id number]      Admission Date: 2/10/2023     Discharge Date: 23    Admitting Diagnosis:   1  Pregnancy at 38w3d  2  Preeclampsia without severe features  3  Meconium-stained amniotic fluid  4  GBS positive    Discharge Diagnosis:   Same, delivered    Procedures: spontaneous vaginal delivery    Delivering Attending: Hayley Felder MD  Discharge Attending: Dr Lindsay Hair Course:     Samantha Beth is a 29 y o  Glen[place] Cage female at 38w3d who was admitted to &D for labor  Her labor course was notable for epidural placement for pain control and SROM for meconium-stained fluid  She is positive for GBS and received penicillin in labor for GBS prophylaxis  She met criteria for preeclampsia without severe features with blood pressures and Protein:Creatine  She progressed to complete at 1245  She delivered a viable female  on 2/10/23 at 65  Weight 5lbs 13oz via spontaneous vaginal delivery  Apgars were 9 (1 min) and 9 (5 min)   was transferred to  nursery  Patient tolerated the procedure well and was transferred to recovery in stable condition  Her post-partum course was uncomplicated  Her post-partum pain was well controlled with oral analgesics  On day of discharge, she was ambulating and able to reasonably perform all ADLs  She was voiding and had appropriate bowel function  Pain was well controlled  She was discharged home on post-partum day #2 without complications  Patient was instructed to follow up with her OB as an outpatient and was given appropriate warnings to call provider if she develops signs of infection or uncontrolled pain  Complications: none apparent    Condition at discharge: good     Discharge instructions/Information to patient and family:   See after visit summary for information provided to patient and family        Provisions for Follow-Up Care:  See after visit summary for information related to follow-up care and any pertinent home health orders  Disposition: Home    Planned Readmission: No    Discharge Medications: For a complete list of the patient's medications, please refer to her med rec

## 2023-02-10 NOTE — ANESTHESIA PREPROCEDURE EVALUATION
Procedure:  LABOR ANALGESIA    Relevant Problems   GYN   (+) 38 weeks gestation of pregnancy               (+) N/V     Physical Exam    Airway    Mallampati score: III  TM Distance: >3 FB  Neck ROM: full     Dental       Cardiovascular  Rhythm: regular, Rate: normal,     Pulmonary  Comment: Bilateral chest rise, not using accessory muscles for breathing,     Other Findings        Anesthesia Plan  ASA Score- 2     Anesthesia Type- epidural with ASA Monitors  Additional Monitors:   Airway Plan:           Plan Factors-    Chart reviewed  Existing labs reviewed  Induction-     Postoperative Plan-     Informed Consent- Anesthetic plan and risks discussed with patient

## 2023-02-10 NOTE — OB LABOR/OXYTOCIN SAFETY PROGRESS
Labor Progress Note - Antonette Cordova 29 y o  female MRN: [id number]    Unit/Bed#: L&D 325-01 Encounter: [id number]          Contraction Quality: Strong  Tachysystole: No   Cervical Dilation: 4        Cervical Effacement: 100  Fetal Station: 1     Fetal Heart Rate: 130 BPM                  Vital Signs:       Vitals:    02/10/23 0754   BP: 137/96   Pulse: 88   Resp: 18   Temp: 97 6 °F (36 4 °C)       Notes/comments:     Active labor   Pt made changes in less then 2 hours  Will admit   Epidural and plan for           Ortega Wheatley MD 2/10/2023 9:13 AM

## 2023-02-10 NOTE — TELEPHONE ENCOUNTER
Reason for Disposition  • Notification of hospital admission    Answer Assessment - Initial Assessment Questions  Pt called in for Labor s/s but did not answer   Pt already arrived at L&D    Protocols used: PCP CALL - NO TRIAGE-ADULT-

## 2023-02-10 NOTE — TELEPHONE ENCOUNTER
Regarding: possible labor  ----- Message from [facility] sent at 2/10/2023  5:29 AM EST -----  "I am 38w and 3d and my contractions are 5-6 mins apart   Should I go to the ED?"

## 2023-02-10 NOTE — OB LABOR/OXYTOCIN SAFETY PROGRESS
Labor Progress Note - Michelle Mater 29 y o  female MRN: [id number]    Unit/Bed#: L&D 325-01 Encounter: [id number]       Contraction Frequency (minutes): 1-5  Contraction Quality: Strong  Tachysystole: No   Cervical Dilation: 9        Cervical Effacement: 100  Fetal Station: 2  Baseline Rate: 130 bpm  Fetal Heart Rate: 130 BPM                  Vital Signs:     Vitals:    02/10/23 0954   BP: (!) 170/103   Pulse: 65   Resp:    Temp:        Notes/comments:       Pt is now PEC without severe features  She does have occasional severe range BP but not sustained    Fully dilated  First attempt to push was not successful   Will allow that baby to come down and then start to push in a bit    Repeat bp in 15 mins  At this time no need to mag or treat BP      Nano Onofre MD 2/10/2023 12:20 PM

## 2023-02-10 NOTE — TELEPHONE ENCOUNTER
Reason for Disposition  • [1] First baby (primipara) AND [2] contractions > 5 minutes apart, or for < 2 hours    Answer Assessment - Initial Assessment Questions  1  ONSET: "When did the symptoms begin?"         An hour  2  CONTRACTIONS: "Describe the contractions that you are having " (e g , duration, frequency, regularity, severity)      5 minutes apart lasting 1 minutes  3  ADRIANNA: "What date are you expecting to deliver?"      2/21  4  PARITY: "Have you had a baby before?" If Yes, ask: "How long did the labor last?"      First  5  FETAL MOVEMENT: "Has the baby's movement decreased or changed significantly from normal?"      denies  6   OTHER SYMPTOMS: "Do you have any other symptoms?" (e g , leaking fluid from vagina, vaginal bleeding, fever, hand/facial swelling)      Losing mucus plug    Protocols used: PREGNANCY - LABOR-ADULT-

## 2023-02-10 NOTE — L&D DELIVERY NOTE
DELIVERY NOTE  Isabella Armijo 29 y o  female MRN: [id number]  Unit/Bed#: L&D 325-01 Encounter: [id number]    Obstetrician:    Dr Eduardo Womack MD    Assistant:   Dr Kamar Israel MD    Pre-Delivery Diagnosis:   1  Pregnancy at 38w3d  2  Preeclampsia without severe features  3  Meconium-stained amniotic fluid    Post-Delivery Diagnosis:   Same as above - Delivered  Viable female fetus  1st degree laceration    Procedure:  Spontaneous vaginal delivery  Repair 1st degree spontaneous laceration      Anesthesia:  epidural    Specimens:   Cord blood obtained   Placenta; normal appearing, central insertion, intact   Arterial and venous blood gases (below)     Gases:  Umbilical Cord Venous Blood Gas:  Results from last 7 days   Lab Units 02/10/23  1522   PH COV  7 343   PCO2 COV mm HG 44 7*   HCO3 COV mmol/L 23 7   BASE EXC COV mmol/L -2 2*   O2 CT CD VB mL/dL 16 2   O2 HGB, VENOUS CORD % 80 9     Umbilical Cord Arterial Blood Gas:  Results from last 7 days   Lab Units 02/10/23  1522   PH COA  7 317   PCO2 COA  40 6   PO2 COA mm HG 26 7*   HCO3 COA mmol/L 20 3   BASE EXC COA mmol/L -5 5*   O2 CONTENT CORD ART ml/dl 13 3   O2 HGB, ARTERIAL CORD % 63 3       Quantitative Blood Loss:   223 mL           Complications:    None    Brief Description of Labor Course:  Isabella Armijo is a 29 y o  [place] female at 38w3d who was admitted to L&D for labor  Her labor course was notable for epidural placement for pain control and SROM for meconium-stained fluid  She is positive for GBS and received penicillin in labor for GBS prophylaxis  She met criteria for preeclampsia without severe features with blood pressures and Protein:Creatine  She progressed to complete at 1245, pushed for 56 min, and delivered a healthy  at 65  Description of Delivery:   With  the assistance of maternal expulsive forces, the fetal vertex delivered spontaneously  A nuchal cord was not noted   The anterior right shoulder was delivered atraumatically with gentle downward traction  The contralateral arm was delivered with gentle upward traction  The remainder of the fetus delivered spontaneously at 65, resulting in a viable female   Upon delivery, the infant was placed on the mothers abdomen and the cord was doubly clamped and cut after 30 seconds  The  was noted to have good tone and cry spontaneously  There was no evidence of injury  The  was passed off to  staff for evaluation  Umbilical cord blood and umbilical artery and venous gases were collected and sent to the lab  An intact placenta was delivered spontaneously at 1527  using fundal massage and gentle cord traction and was noted to have a centrally-inserted 3-vessel cord  Active management of the third stage of labor was undertaken with IV pitocin at 250milliunits/min  Inspection of the perineum, vagina, labia, cervix, and urethra revealed a 1st degree laceration  Bleeding was noted to be under control   Outcome:  Living  with APGARS 9 (1 min) and 9 (5 min)   weight: pending    Perineal Inspection/Laceration Repair  Inspection of the perineum, vagina, labia, cervix, and urethra revealed a 1st degree laceration, which was repaired in standard fashion with 3-0 Vicryl rapide  Patient was comfortable with epidural at that time  The laceration showed good tissue reapproximation and hemostasis  At the conclusion of the delivery, all needle, sponge, and instrument counts were noted to be correct  Patient tolerated the procedure well and was allowed to recover in labor and delivery room with family and  before being transferred to the post-partum floor  Conclusion:  Mother and baby are currently recovering nicely in stable condition  Attending Supervision:   Dr Alfonso Marie MD was present for the entire procedure      Maria Esther Diallo MD   OB/GYN PGY-1   2/10/2023 3:38 PM

## 2023-02-10 NOTE — PROGRESS NOTES
Patient had one severe range BP of 170/103, not sustained after 15 minute recheck  Patient then received epidural  Subsequent Bps have been 130s-150s/80s  CBC/CMP wnl, PCr pending  Will continue to monitor pressures and treat PRN         Jose Romero MD  Obstetrics & Gynecology, PGY1

## 2023-02-10 NOTE — TELEPHONE ENCOUNTER
Regardin+5, contractions 5 min apart  ----- Message from Nickolas Lopez sent at 2/10/2023  7:28 AM EST -----  "I am 38 weeks and 5 days, my contractions are 5 min apart "

## 2023-02-10 NOTE — LACTATION NOTE
This note was copied from a baby's chart  CONSULT - LACTATION  Baby Girl Shefali Muniz) Devorah Villalba 0 days female MRN: [id number]    [facility] NURSERY Room / Bed: L&D 325(N)/L&D 325(N) Encounter: [id number]    Maternal Information     MOTHER:  Shelby Oliveira  Maternal Age: 29 y o    OB History: # 1 - Date: 2012, Sex: None, Weight: None, GA: 8w0d, Delivery: None, Apgar1: None, Apgar5: None, Living: None, Birth Comments: None    # 2 - Date: None, Sex: None, Weight: None, GA: None, Delivery: None, Apgar1: None, Apgar5: None, Living: None, Birth Comments: None   Previouse breast reduction surgery? No    Lactation history:   Has patient previously breast fed: No   How long had patient previously breast fed:     Previous breast feeding complications:       Past Surgical History:   Procedure Laterality Date   • INDUCED       Surgically induced  Description: 2012         Birth information:  YOB: 2021   Time of birth: 3:22 PM   Sex: female   Delivery type:     Birth Weight: 2635 g (5 lb 13 oz)   Percent of Weight Change: 0%     Gestational Age: 36w4d   [unfilled]    Assessment     Breast and nipple assessment: normal assessment     Assessment: normal assessment    Feeding assessment: feeding well  LATCH:  Latch: Grasps breast, tongue down, lips flanged, rhythmic sucking   Audible Swallowing: Spontaneous and intermittent (24 hours old)   Type of Nipple: Everted (After stimulation)   Comfort (Breast/Nipple): Soft/non-tender   Hold (Positioning): Full assist, staff holds infant at breast   LATCH Score: 8         Having latch problems?  No   Position(s) Used Cross Cradle  (left)   Breasts/Nipples   Left Breast Soft   Right Breast Soft   Left Nipple Everted   Right Nipple Everted   Intervention Hand expression   Breastfeeding Status Yes   Breastfeeding Progress Not yet established;Breastfeeding well   Patient Follow-Up   Lactation Consult Status 2   Follow-Up Type Inpatient;Call as needed   Other OB Lactation Documentation    Additional Problem Noted Bruno latched well in delivery room after birth  HE + for small droplet of colostrum  (Reviewed RSB  D/C booklet at bedside )       Feeding recommendations:  breast feed on demand     Information on hand expression given  Discussed benefits of knowing how to manually express breast including stimulating milk supply, softening nipple for latch and evacuating breast in the event of engorgement  Reviewed how to bring baby to the breast so that her lower lip and chin touch the breast with her nose just above the nipple to encourage a wider, more asymmetric latch  Met with mother  Provided mother with Ready, Set, Baby booklet which contained information on:  Hand expression with access to QR codes to review hand expression  Positioning and latch reviewed as well as showing images of other feeding positions  Discussed the properties of a good latch in any position  Feeding on cue and what that means for recognizing infant's hunger, s/s that baby is getting enough milk and some s/s that breastfeeding dyad may need further help  Skin to Skin contact an benefits to mom and baby  Avoidance of pacifiers for the first month discussed  Gave information on common concerns, what to expect the first few weeks after delivery, preparing for other caregivers, and how partners can help  Resources for support also provided  Encouraged parents to call for assistance, questions, and concerns about breastfeeding  Extension provided      Abhinav Frances RN 2/10/2023 5:54 PM

## 2023-02-10 NOTE — PROGRESS NOTES
Patient has made cervical change to 4 cm   GBS positive  IV   Epidural antibotics   Plan for      Elevated BP at 11 weeks 158/86  Today in labor 138/96  Will get PEC labs

## 2023-02-11 RX ADMIN — IBUPROFEN 600 MG: 600 TABLET, FILM COATED ORAL at 05:23

## 2023-02-11 RX ADMIN — DOCUSATE SODIUM 100 MG: 100 CAPSULE, LIQUID FILLED ORAL at 11:16

## 2023-02-11 RX ADMIN — ACETAMINOPHEN 325MG 650 MG: 325 TABLET ORAL at 20:17

## 2023-02-11 RX ADMIN — ACETAMINOPHEN 325MG 650 MG: 325 TABLET ORAL at 07:15

## 2023-02-11 RX ADMIN — IBUPROFEN 600 MG: 600 TABLET, FILM COATED ORAL at 11:16

## 2023-02-11 RX ADMIN — IBUPROFEN 600 MG: 600 TABLET, FILM COATED ORAL at 16:22

## 2023-02-11 RX ADMIN — IBUPROFEN 600 MG: 600 TABLET, FILM COATED ORAL at 23:42
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
## 2023-02-11 NOTE — ANESTHESIA POSTPROCEDURE EVALUATION
Post-Op Assessment Note    CV Status:  Stable    Pain management: adequate     Mental Status:  Alert and awake   Hydration Status:  Euvolemic   PONV Controlled:  Controlled   Airway Patency:  Patent      Post Op Vitals Reviewed: Yes      Staff: Anesthesiologist     Post-op block assessment: catheter intact and no complications      No notable events documented      BP      Temp      Pulse     Resp      SpO2      /86   Pulse 96   Temp 98 °F (36 7 °C) (Oral)   Resp 18   Ht 5' 5" (1 651 m)   Wt 90 3 kg (199 lb)   LMP 06/15/2022 (Exact Date)   SpO2 100%   Breastfeeding Yes   BMI 33 12 kg/m²

## 2023-02-11 NOTE — PROGRESS NOTES
Progress Note - OB/GYN  April Varner 29 y o  female MRN: [id number]  Unit/Bed#: L&D 315-01 Encounter: [id number]    Assessment and Plan     April Varner is a patient of: [address]  She is PPD# 1 s/p  spontaneous vaginal delivery  Recovering well and is stable       Pre-eclampsia, antepartum  Assessment & Plan  CBC/CMP wnl, PCr 8 82  Systolic (92TPT), LKC:552 , Min:107 , NVC:512   Diastolic (17KMF), JMM:92, Min:56, Max:83    Last SRBP on 2/10 at 1027, nonsustained      *  (spontaneous vaginal delivery)  Assessment & Plan  Lochia WNL   Recovering well   Appropriate bowel and bladder function   Pain well controlled   Tolerating diet   Breastfeeding   Ambulating without issues   No lower extremity tenderness  GBS positive, adequately treated   Rh positive   Contraception: declines      Disposition    - Anticipate discharge home on PPD# 1-2      Subjective/Objective     Chief Complaint: Postpartum State     Subjective:    April Varner is PPD#1 s/p  spontaneous vaginal delivery  She has no current complaints  Pain is well controlled  Patient is currently voiding  She is ambulating  Patient is currently passing flatus and has had no bowel movement  She is tolerating PO, and denies nausea or vomitting  Patient denies fever, chills, chest pain, shortness of breath, or calf tenderness  Lochia is normal  She is  Breastfeeding  She is recovering well and is stable         Vitals:   /83 (BP Location: Right arm)   Pulse 105   Temp 98 2 °F (36 8 °C) (Oral)   Resp 16   Ht 5' 5" (1 651 m)   Wt 90 3 kg (199 lb)   LMP 06/15/2022 (Exact Date)   SpO2 97%   Breastfeeding Yes   BMI 33 12 kg/m²       Intake/Output Summary (Last 24 hours) at 2023 6051  Last data filed at 2/10/2023 1921  Gross per 24 hour   Intake --   Output 773 ml   Net -773 ml       Invasive Devices     Peripheral Intravenous Line  Duration           Peripheral IV 02/10/23 Left Antecubital <1 day Physical Exam:   GEN: Ramonita Salgado appears well, alert and oriented x 3, pleasant and cooperative   CARDIO: RRR, no murmurs or rubs  RESP:  CTAB, no wheezes or rales  ABDOMEN: soft, no tenderness, no distention, fundus @ umbilicus  EXTREMITIES: SCDs on, non tender, no erythema, b/l Marimar's sign negative      Labs:     Hemoglobin   Date Value Ref Range Status   02/10/2023 13 4 11 5 - 15 4 g/dL Final   11/23/2022 11 3 (L) 11 5 - 15 4 g/dL Final     WBC   Date Value Ref Range Status   02/10/2023 14 60 (H) 4 31 - 10 16 Thousand/uL Final   11/23/2022 14 32 (H) 4 31 - 10 16 Thousand/uL Final     Platelets   Date Value Ref Range Status   02/10/2023 311 149 - 390 Thousands/uL Final   11/23/2022 319 149 - 390 Thousands/uL Final     Creatinine   Date Value Ref Range Status   02/10/2023 0 65 0 60 - 1 30 mg/dL Final     Comment:     Standardized to IDMS reference method   01/06/2022 0 88 0 60 - 1 30 mg/dL Final     Comment:     Standardized to IDMS reference method     AST   Date Value Ref Range Status   02/10/2023 18 13 - 39 U/L Final     Comment:     Specimen collection should occur prior to Sulfasalazine administration due to the potential for falsely depressed results  01/06/2022 16 5 - 45 U/L Final     Comment:     Specimen collection should occur prior to Sulfasalazine administration due to the potential for falsely depressed results  ALT   Date Value Ref Range Status   02/10/2023 23 7 - 52 U/L Final     Comment:     Specimen collection should occur prior to Sulfasalazine administration due to the potential for falsely depressed results  01/06/2022 31 12 - 78 U/L Final     Comment:     Specimen collection should occur prior to Sulfasalazine administration due to the potential for falsely depressed results             Dee Montero MD  2/11/2023  6:21 AM

## 2023-02-11 NOTE — ASSESSMENT & PLAN NOTE
Lochia WNL   Recovering well   Appropriate bowel and bladder function   Pain well controlled   Tolerating diet   Breastfeeding   Ambulating without issues   No lower extremity tenderness  GBS positive, adequately treated   Rh positive   Contraception: declines Patient : Noe Hendersno Age: 78 year old Sex: male   MRN: [id number] Encounter Date: 10/18/2021      History     Chief Complaint   Patient presents with   • Abnormal Diagnostic Test     HPI  Patient is a 78-year-old male who comes in because he received a certified letter in the mail telling him that he had abnormal CAT scan results during his last ER visit and that he should come and be reevaluated.  Patient states that he had pain in his abdomen for about 2 days during his last ER visit but has not had any pain since.  That ER visit was September 24.  Patient denies any nausea, vomiting, diarrhea, fever, abdominal pain at this time.  Allergies   Allergen Reactions   • Penicillins Other (See Comments)     [facility] Allergy Text Annotation: penicillins       Current Discharge Medication List      Prior to Admission Medications    Details   HYDROcodone-acetaminophen (NORCO) 5-325 MG per tablet Take 1-2 tablets by mouth every 4 hours as needed for Pain.  Qty: 12 tablet, Refills: 0             Past Medical History:   Diagnosis Date   • Chronic pain    • Diabetes mellitus (CMS/HCC)    • High cholesterol        No past surgical history on file.    No family history on file.  Family history: No inherited medical conditions are reported  Social History     Tobacco Use   • Smoking status: Never Smoker   • Smokeless tobacco: Never Used   Substance Use Topics   • Alcohol use: Not Currently   • Drug use: Never       Review of Systems   A complete ROS was done and is negative unless otherwise stated in the [facility]  Physical Exam     ED Triage Vitals   ED Triage Vitals Group      Temp 10/18/21 0845 98.4 °F (36.9 °C)      Heart Rate 10/18/21 0845 68      Resp 10/18/21 0846 18      BP 10/18/21 0845 (!) 167/69      SpO2 10/18/21 0845 99 %      EtCO2 mmHg --       Height 10/18/21 0845 6' (1.829 m)      Weight 10/18/21 0845 205 lb 14.6 oz (93.4 kg)      Weight Scale Used 10/18/21 0845 Standing scale      BMI (Calculated) 10/18/21 0845  27.93      IBW/kg (Calculated) 10/18/21 0845 77.6       Physical Exam  Constitutional: he is oriented to person, place, and time. he appears well-developed and well-nourished. No distress.    HENT:    Head: Normocephalic.    Right Ear: External ear normal.    Left Ear: External ear normal.    Nose: Nose normal.    Mouth/Throat: Oropharynx is clear and moist.    Eyes: Conjunctivae and EOM are normal. Pupils are equal, round, and reactive to light.    Neck: Normal range of motion. No JVD present. No tracheal deviation present.    Cardiovascular: Normal rate, regular rhythm and normal heart sounds.  Exam reveals no gallop and no friction rub.     No murmur heard.   Pulmonary/Chest: Effort normal and breath sounds normal. No respiratory distress. he has no wheezes. he has no rales.    Abdominal: Soft. he exhibits no distension. There is no tenderness.    Musculoskeletal: Normal range of motion. No obvious deformity   Neurological: he is alert and oriented to person, place, and time. No cranial nerve deficit.    Skin: Skin is warm and dry. No rash noted. he is not diaphoretic. No erythema.    Psychiatric: he has a normal mood and affect. Judgment and thought content normal.    Nursing note and vitals reviewed.  ED Course         Lab Results     Results for orders placed or performed during the hospital encounter of 10/18/21   Comprehensive Metabolic Panel   Result Value Ref Range    Fasting Status      Sodium 140 135 - 145 mmol/L    Potassium 4.1 3.4 - 5.1 mmol/L    Chloride 112 (H) 98 - 107 mmol/L    Carbon Dioxide 24 21 - 32 mmol/L    Anion Gap 8 (L) 10 - 20 mmol/L    Glucose 189 (H) 70 - 99 mg/dL    BUN 9 6 - 20 mg/dL    Creatinine 0.86 0.67 - 1.17 mg/dL    Glomerular Filtration Rate 84 >=60    BUN/ Creatinine Ratio 10 7 - 25    Calcium 8.9 8.4 - 10.2 mg/dL    Bilirubin, Total 0.3 0.2 - 1.0 mg/dL    GOT/AST 14 <=37 Units/L    GPT/ALT 26 <64 Units/L    Alkaline Phosphatase 134 (H) 45 - 117 Units/L    Albumin 3.2 (L)  3.6 - 5.1 g/dL    Protein, Total 7.1 6.4 - 8.2 g/dL    Globulin 3.9 2.0 - 4.0 g/dL    A/G Ratio 0.8 (L) 1.0 - 2.4   Lipase   Result Value Ref Range    Lipase 109 73 - 393 Units/L   CBC with Automated Differential (performable only)   Result Value Ref Range    WBC 8.5 4.2 - 11.0 K/mcL    RBC 4.12 (L) 4.50 - 5.90 mil/mcL    HGB 12.3 (L) 13.0 - 17.0 g/dL    HCT 37.6 (L) 39.0 - 51.0 %    MCV 91.3 78.0 - 100.0 fl    MCH 29.9 26.0 - 34.0 pg    MCHC 32.7 32.0 - 36.5 g/dL    RDW-CV 13.6 11.0 - 15.0 %    RDW-SD 45.4 39.0 - 50.0 fL     140 - 450 K/mcL    NRBC 0 <=0 /100 WBC    Neutrophil, Percent 65 %    Lymphocytes, Percent 22 %    Mono, Percent 7 %    Eosinophils, Percent 5 %    Basophils, Percent 1 %    Immature Granulocytes 0 %    Absolute Neutrophils 5.5 1.8 - 7.7 K/mcL    Absolute Lymphocytes 1.8 1.0 - 4.0 K/mcL    Absolute Monocytes 0.6 0.3 - 0.9 K/mcL    Absolute Eosinophils  0.4 0.0 - 0.5 K/mcL    Absolute Basophils 0.1 0.0 - 0.3 K/mcL    Absolute Immmature Granulocytes 0.0 0.0 - 0.2 K/mcL       Patient with normal labs and no symptoms at this time.  No grossly obvious cholecystitis on ultrasound.  Patient is told to follow-up with surgery for further evaluation.    Radiology Results     Imaging Results          US LIVER GALLBLADDER PANCREAS (Final result)  Result time 10/18/21 09:54:11    Final result                 Impression:    1.  Fatty infiltration of the liver.  2.  Gallbladder is slightly contracted although there is a suggestion of  gallbladder wall thickening with gallstones noted and the possibility of  cholecystitis cannot be excluded.    Electronically Signed by: HARLEY HOWARD M.D.   Signed on: 10/18/2021 9:54 AM                Narrative:    EXAM: US LIVER GALLBLADDER PANCREAS    CLINICAL INDICATION: Right upper quadrant pain.     COMPARISON: CT scan of the abdomen and pelvis with contrast 09/24/2021 as  well as right upper quadrant ultrasound 11/13/2015.    FINDINGS: The liver shows overall  diffuse increased echotexture consistent  with fatty infiltration.  And pancreas show homogeneous echotexture without  focal masses.  The gallbladder is collapsed although there is a suggestion  of thickening of the gallbladder wall with small gallstones noted and the  possibility of cholecystitis cannot be excluded. There is no evidence of  intrahepatic ductal dilatation with the common hepatic duct measuring 4.4  mm in size.  Note is made of a right renal cyst.                                ED Medication Orders (From admission, onward)    None               MDM  Multiple differential diagnoses were considered. The patient / caregivers were apprised of diagnostic / treatment options including alternate modes of care, in addition to the risks and benefits, for this medical condition. Based on this discussion the patient / caregiver agrees with this chosen diagnostic and treatment plan.  Clinical Impression     ED Diagnosis   1. Biliary calculus of other site without obstruction         Disposition        Discharge 10/18/2021 10:06 AM  Noe Henderson discharge to home/self care.        DISCUSSION OF PLAN AND IMPORTANCE OF FOLLOW-UP CARE:    The plan was discussed verbally and key components were summarized in the discharge instructions. All questions were answered.     In discussing management and follow-up, they are advised that the plan is based only on information that was available at the time of emergency department evaluation. Additional testing and treatment may be necessary, and outpatient evaluation is frequently required to ensure complete care. At the same time, there is always potential for symptoms to recur or worsen, or for additional signs or symptoms to develop that could substantially affect the treatment plan. If any new or uncontrolled symptoms occur, or if there are any other concerns, they are advised to seek medical evaluation immediately.                    Blane Terrazas,  MD  10/18/21 1001

## 2023-02-11 NOTE — LACTATION NOTE
This note was copied from a baby's chart  02/11/23 8937   Lactation Consultation   Reason for Consult 20 min;10 minutes   LATCH Documentation   Latch 2   Audible Swallowing 2   Type of Nipple 2   Comfort (Breast/Nipple) 2   Hold (Positioning) 1   LATCH Score 9   Having latch problems? No   Position(s) Used Side Lying   Breasts/Nipples   Left Breast Soft   Right Breast Soft   Left Nipple Everted   Right Nipple Everted   Breastfeeding Status Yes   Breastfeeding Progress Not yet established;Breastfeeding well   Patient Follow-Up   Lactation Consult Status 2   Follow-Up Type Inpatient;Call as needed   Other OB Lactation Documentation    Additional Problem Noted Observed infant with pacifier in mouth   Education for family provided encouraging frequent latching to the breast instead of pacifier use for stimulation/demand to increase breast milk production

## 2023-02-12 VITALS
DIASTOLIC BLOOD PRESSURE: 71 MMHG | OXYGEN SATURATION: 97 % | SYSTOLIC BLOOD PRESSURE: 129 MMHG | TEMPERATURE: 98.1 F | HEIGHT: 65 IN | WEIGHT: 199 LBS | BODY MASS INDEX: 33.15 KG/M2 | RESPIRATION RATE: 16 BRPM | HEART RATE: 99 BPM

## 2023-02-12 PROBLEM — Z87.898 HISTORY OF DOMESTIC VIOLENCE: Status: ACTIVE | Noted: 2023-02-12

## 2023-02-12 RX ORDER — ACETAMINOPHEN 325 MG/1
650 TABLET ORAL EVERY 4 HOURS PRN
Qty: 30 TABLET | Refills: 0 | Status: SHIPPED | OUTPATIENT
Start: 2023-02-12

## 2023-02-12 RX ORDER — IBUPROFEN 600 MG/1
600 TABLET ORAL EVERY 6 HOURS PRN
Qty: 30 TABLET | Refills: 0 | Status: SHIPPED | OUTPATIENT
Start: 2023-02-12

## 2023-02-12 RX ADMIN — DOCUSATE SODIUM 100 MG: 100 CAPSULE, LIQUID FILLED ORAL at 11:50

## 2023-02-12 RX ADMIN — IBUPROFEN 600 MG: 600 TABLET, FILM COATED ORAL at 11:50

## 2023-02-12 RX ADMIN — IBUPROFEN 600 MG: 600 TABLET, FILM COATED ORAL at 06:04

## 2023-02-12 NOTE — NURSING NOTE
Patient received discharge teaching, educational packet and save your life magnet  All questions and concerns addressed  Pt  Stated an understanding
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
## 2023-02-13 NOTE — UTILIZATION REVIEW
NOTIFICATION OF INPATIENT ADMISSION   MATERNITY/DELIVERY AUTHORIZATION REQUEST   SERVICING FACILITY:   [address] [place] [address] - L&D, , NICU  [facility], [address] [facility]  Tax ID: [id number]  NPI: [id number] ATTENDING PROVIDER:  Attending Name and NPI#: Yaakov Huitron Md [[id number]]  Address: [address] [place] [address], [address] [facility]  Phone: [phone]     ADMISSION INFORMATION:  Place of Service: Inpatient [facility]  [address]  Place of Service Code: 21  Inpatient Admission Date/Time: 2/10/23  9:16 AM  Discharge Date/Time: 2023 12:59 PM  Admitting Diagnosis Code/Description:  Uterine contractions [O47 9]     Mother: Loren 1990 Estimated Date of Delivery: 23  Delivering clinician:     OB History        2    Para   1    Term   1       0    AB   1    Living   1       SAB   0    IAB   1    Ectopic   0    Multiple   0    Live Births   1                Name & MRN:   Information for the patient's :  Ida Mcintyre Girl Cassandra Villa) [[id number]]     [place] Delivery Information:  Sex: female  Delivered 2/10/2023 3:22 PM by ; Gestational Age: 36w4d     Measurements:  Weight: 5 lb 13 oz (2635 g); Height: 19"    APGAR 1 minute 5 minutes 10 minutes   Totals: 9 9       Birth Information: 29 y o  female MRN: [id number] Unit/Bed#: L&D 315-01   Birthweight: No birth weight on file  Gestational Age: <None> Delivery Type:    APGARS Totals:        UTILIZATION REVIEW CONTACT:  Silvio Mitchell Utilization   Network Utilization Review Department  Phone: [phone]  Fax [phone]  Email: [email or web address]  Contact for approvals/pending authorizations, clinical reviews, and discharge  PHYSICIAN ADVISORY SERVICES:  Medical Necessity Denial & Ycvq-mv-Nhgx Review  Phone: [phone]  Fax: [phone]  Email: [email or web address]        Carmen Hickman MD  Resident  OB/GYN  Discharge Summary     Attested  Date of Service:  2023  7:12 AM     Attested            Attestation signed by Justin Arriaga DO at 2023 10:06 AM     I agree with the Resident's discharge summary      Justin Arriaga DO 23                Discharge Summary - OB/GYN   Paulette Nunez 29 y o  female MRN: [id number]  Unit/Bed#: L&D 325-01 Encounter: [id number]        Admission Date: 2/10/2023      Discharge Date: 23     Admitting Diagnosis:   1  Pregnancy at 38w3d  2  Preeclampsia without severe features  3  Meconium-stained amniotic fluid  4  GBS positive     Discharge Diagnosis:   Same, delivered     Procedures: spontaneous vaginal delivery     Delivering Attending: Marcelle Lanza MD  Discharge Attending: Dr Felipe Carlin:      Ana Oliveira is a 29 y o   female at 38w3d who was admitted to L&D for labor  Her labor course was notable for epidural placement for pain control and SROM for meconium-stained fluid  She is positive for GBS and received penicillin in labor for GBS prophylaxis  She met criteria for preeclampsia without severe features with blood pressures and Protein:Creatine  She progressed to complete UV 2790      She delivered a viable female  on 2/10/23 at 1522  Weight 5lbs 13oz via spontaneous vaginal delivery  Apgars were 9 (1 min) and 9 (5 min)   was transferred to  nursery  Patient tolerated the procedure well and was transferred to recovery in stable condition       Her post-partum course was uncomplicated  Her post-partum pain was well controlled with oral analgesics      On day of discharge, she was ambulating and able to reasonably perform all ADLs  She was voiding and had appropriate bowel function  Pain was well controlled  She was discharged home on post-partum day #2 without complications   Patient was instructed to follow up with her OB as an outpatient and was given appropriate warnings to call provider if she develops signs of infection or uncontrolled pain      Complications: none apparent     Condition at discharge: good      Discharge instructions/Information to patient and family:   See after visit summary for information provided to patient and family        Provisions for Follow-Up Care:  See after visit summary for information related to follow-up care and any pertinent home health orders        Disposition: Home     Planned Readmission: No     Discharge Medications: For a complete list of the patient's medications, please refer to her med rec                         Cosigned by:  Arabella Sylvester DO at 2/12/2023 10:06 AM     Revision History

## 2023-02-14 ENCOUNTER — ROUTINE PRENATAL (OUTPATIENT)
Dept: OBGYN CLINIC | Facility: CLINIC | Age: 33
End: 2023-02-14

## 2023-02-14 VITALS
BODY MASS INDEX: 32.15 KG/M2 | SYSTOLIC BLOOD PRESSURE: 121 MMHG | DIASTOLIC BLOOD PRESSURE: 80 MMHG | HEART RATE: 94 BPM | WEIGHT: 193.2 LBS

## 2023-02-14 DIAGNOSIS — O14.90 PRE-ECLAMPSIA, ANTEPARTUM: ICD-10-CM

## 2023-02-14 NOTE — PROGRESS NOTES
Subjective     Facundo Coombs is a 29 y o  y o  female  who presents for a postpartum visit and blood pressure check  She is 4 days postpartum following a spontaneous vaginal delivery on 2/10/23  She is getting over a cold with some general malaise, but otherwise feels well today  Denies headaches, vision changes, epigastric pain  I have fully reviewed the prenatal and intrapartum course  The delivery was at 45 gestational weeks  Outcome: spontaneous vaginal delivery  Anesthesia: epidural  Bowel function is normal  Bladder function is normal  Patient is not sexually active  Contraception method is none  Postpartum depression screening: negative  Review of Systems  A comprehensive review of systems was negative except for: Constitutional: positive for fatigue and malaise  Ears, nose, mouth, throat, and face: positive for nasal congestion  Objective     /64   Wt 59 4 kg (131 lb)   BMI 22 49 kg/m²    General:   appears stated age, cooperative, alert normal mood and affect   Neck: Neck: normal, supple,trachea midline, no masses   Heart: regular rate and rhythm, S1, S2 normal, no murmur, click, rub or gallop   Lungs: clear to auscultation bilaterally       Abdomen: soft, non-tender, without masses or organomegaly   Uterus: firm     Assessment/Plan     31yo PPD#4 after  complicated by pre-eclampsia without severe features  Feeling well woday  BP normal  Asymptomatic: denies headaches, vision changes, chest/epigastric pain  1  Contraception: none, patient declines  2  Follow up in: 3 weeks or as needed

## 2023-02-20 DIAGNOSIS — Z3A.11 11 WEEKS GESTATION OF PREGNANCY: ICD-10-CM

## 2023-02-27 ENCOUNTER — HOSPITAL ENCOUNTER (EMERGENCY)
Facility: HOSPITAL | Age: 33
Discharge: HOME | End: 2023-02-27
Attending: EMERGENCY MEDICINE

## 2023-02-27 VITALS
SYSTOLIC BLOOD PRESSURE: 142 MMHG | RESPIRATION RATE: 16 BRPM | HEART RATE: 99 BPM | TEMPERATURE: 98.5 F | DIASTOLIC BLOOD PRESSURE: 90 MMHG | OXYGEN SATURATION: 99 %

## 2023-02-27 DIAGNOSIS — S01.511A LIP LACERATION, INITIAL ENCOUNTER: Primary | ICD-10-CM

## 2023-02-27 RX ORDER — VITAMIN A, VITAMIN C, VITAMIN D, VITAMIN E, THIAMINE, RIBOFLAVIN, NIACIN, VITAMIN B6, FOLIC ACID, VITAMIN B12, CALCIUM, IRON, ZINC, COPPER 4000; 120; 400; 22; 1.84; 3; 20; 10; 1; 12; 200; 27; 25; 2 [IU]/1; MG/1; [IU]/1; [IU]/1; MG/1; MG/1; MG/1; MG/1; MG/1; UG/1; MG/1; MG/1; MG/1; MG/1
TABLET ORAL
Qty: 30 TABLET | Refills: 6 | Status: SHIPPED | OUTPATIENT
Start: 2023-02-27

## 2023-02-27 RX ORDER — LIDOCAINE HYDROCHLORIDE 20 MG/ML
10 SOLUTION OROPHARYNGEAL ONCE
Status: COMPLETED | OUTPATIENT
Start: 2023-02-27 | End: 2023-02-27

## 2023-02-27 RX ORDER — CHLORHEXIDINE GLUCONATE 0.12 MG/ML
15 RINSE ORAL 2 TIMES DAILY
Qty: 120 ML | Refills: 0 | Status: SHIPPED | OUTPATIENT
Start: 2023-02-27

## 2023-02-27 RX ORDER — LIDOCAINE HYDROCHLORIDE 10 MG/ML
5 INJECTION, SOLUTION EPIDURAL; INFILTRATION; INTRACAUDAL; PERINEURAL ONCE
Status: COMPLETED | OUTPATIENT
Start: 2023-02-27 | End: 2023-02-27

## 2023-02-27 RX ADMIN — LIDOCAINE HYDROCHLORIDE 5 ML: 10 INJECTION, SOLUTION EPIDURAL; INFILTRATION; INTRACAUDAL at 01:09

## 2023-02-27 RX ADMIN — LIDOCAINE HYDROCHLORIDE 10 ML: 20 SOLUTION ORAL; TOPICAL at 01:09

## 2023-02-27 NOTE — ED NOTES
Dissolvable sutures placed by provider  Patient tolerated well        Davina Edwards RN  02/27/23 8685
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
## 2023-03-06 LAB — PLACENTA IN STORAGE: NORMAL

## 2023-03-16 ENCOUNTER — PATIENT OUTREACH (OUTPATIENT)
Dept: OBGYN CLINIC | Facility: CLINIC | Age: 33
End: 2023-03-16

## 2023-03-16 NOTE — PROGRESS NOTES
Called Shelby to see how she is doing PP, reports some days are better than others and does feel sad on occasion, identified this as a major life change  Her partner is currently home with her and is supportive, she goes back to work in may, partner goes back in June  Sent a message to the front office staff to get pt re-scheduled for PP visit, pt scheduled for 3/17 at 12:30, EDISON CM will not be working at that time and will f/u for any needs next week

## 2023-03-17 ENCOUNTER — POSTPARTUM VISIT (OUTPATIENT)
Dept: OBGYN CLINIC | Facility: CLINIC | Age: 33
End: 2023-03-17

## 2023-03-17 VITALS
WEIGHT: 178 LBS | HEART RATE: 77 BPM | BODY MASS INDEX: 29.62 KG/M2 | DIASTOLIC BLOOD PRESSURE: 85 MMHG | SYSTOLIC BLOOD PRESSURE: 136 MMHG

## 2023-03-17 PROBLEM — Z34.93 PRENATAL CARE IN THIRD TRIMESTER: Status: RESOLVED | Noted: 2022-08-17 | Resolved: 2023-03-17

## 2023-03-17 PROBLEM — R11.0 NAUSEA: Status: RESOLVED | Noted: 2022-08-17 | Resolved: 2023-03-17

## 2023-03-17 PROBLEM — Z3A.38 38 WEEKS GESTATION OF PREGNANCY: Status: RESOLVED | Noted: 2022-09-13 | Resolved: 2023-03-17

## 2023-03-17 PROBLEM — Z13.79 GENETIC SCREENING: Status: RESOLVED | Noted: 2022-09-14 | Resolved: 2023-03-17

## 2023-03-17 NOTE — PROGRESS NOTES
OB POSTPARTUM VISIT PROGRESS NOTE  Date of Encounter: 3/17/2023    Karo Ferrer    : 1990  (34 y o )  MR: [id number]    Cari Barros is in for her postpartum visit  She is now   She delivered by normal spontaneous vaginal delivery  She's generally doing well, denies current pain or bleeding issues, and has no significant depression issues  [place] score was 5  Pt is assisted with baby by her Family  She is breast feeding exclusively  Pt states baby at times is fed with a bottle of breast josiefWe discussed all appropriate contraceptive options and she chooses Plans condoms for birth contol, pt states she is currently not with a partner  Denies problems with urination or BM's  Aware intercourse and exercise should not resume until 6 weeks postpartum     Last PAP was 2023    Objective   EXAM:  GENERAL: alert, well appearing, and in no distress  VITALS: Blood pressure 136/85, pulse 77, weight 80 7 kg (178 lb), last menstrual period 06/15/2022, currently breastfeeding  BMI: Body mass index is 29 62 kg/m²  WNL respiratory effort, negative cough or SOB  BREASTS: Denies pain, redness or lumps:   EXTREMITIES: Denies pain, redness or edema    Assessment/Plan   Diagnoses and all orders for this visit:    Postpartum follow-up     (spontaneous vaginal delivery)        Plan  Patient Instructions   Call with needs or concerns  Annual GYN exam is due after 2023    Return in about 5 months (around 2023) for Annual GYN exam, after 2023  Pt verbalized understanding of all discussed        DOMONIQUE Lee

## 2023-03-23 ENCOUNTER — PATIENT OUTREACH (OUTPATIENT)
Dept: OBGYN CLINIC | Facility: CLINIC | Age: 33
End: 2023-03-23

## 2023-03-25 DIAGNOSIS — R12 HEARTBURN DURING PREGNANCY IN SECOND TRIMESTER: ICD-10-CM

## 2023-03-25 DIAGNOSIS — O26.892 HEARTBURN DURING PREGNANCY IN SECOND TRIMESTER: ICD-10-CM

## 2023-03-27 RX ORDER — FAMOTIDINE 20 MG/1
TABLET, FILM COATED ORAL
Qty: 60 TABLET | Refills: 3 | Status: SHIPPED | OUTPATIENT
Start: 2023-03-27

## 2023-05-18 ENCOUNTER — OFFICE VISIT (OUTPATIENT)
Dept: FAMILY MEDICINE CLINIC | Facility: CLINIC | Age: 33
End: 2023-05-18

## 2023-05-18 ENCOUNTER — APPOINTMENT (OUTPATIENT)
Dept: LAB | Facility: HOSPITAL | Age: 33
End: 2023-05-18
Payer: COMMERCIAL

## 2023-05-18 VITALS
DIASTOLIC BLOOD PRESSURE: 80 MMHG | SYSTOLIC BLOOD PRESSURE: 118 MMHG | OXYGEN SATURATION: 100 % | TEMPERATURE: 97.8 F | WEIGHT: 167 LBS | BODY MASS INDEX: 27.82 KG/M2 | HEART RATE: 98 BPM | HEIGHT: 65 IN | RESPIRATION RATE: 16 BRPM

## 2023-05-18 DIAGNOSIS — Z00.00 WELL ADULT EXAM: ICD-10-CM

## 2023-05-18 DIAGNOSIS — Z11.3 SCREENING FOR STDS (SEXUALLY TRANSMITTED DISEASES): ICD-10-CM

## 2023-05-18 DIAGNOSIS — Z00.00 ANNUAL PHYSICAL EXAM: ICD-10-CM

## 2023-05-18 DIAGNOSIS — Z00.00 ANNUAL PHYSICAL EXAM: Primary | ICD-10-CM

## 2023-05-18 DIAGNOSIS — N30.01 ACUTE CYSTITIS WITH HEMATURIA: ICD-10-CM

## 2023-05-18 LAB
25(OH)D3 SERPL-MCNC: 20.8 NG/ML (ref 30–100)
ALBUMIN SERPL BCP-MCNC: 4.4 G/DL (ref 3.5–5)
ALP SERPL-CCNC: 112 U/L (ref 34–104)
ALT SERPL W P-5'-P-CCNC: 14 U/L (ref 7–52)
ANION GAP SERPL CALCULATED.3IONS-SCNC: 6 MMOL/L (ref 4–13)
AST SERPL W P-5'-P-CCNC: 14 U/L (ref 13–39)
BILIRUB SERPL-MCNC: 1.53 MG/DL (ref 0.2–1)
BUN SERPL-MCNC: 11 MG/DL (ref 5–25)
CALCIUM SERPL-MCNC: 8.9 MG/DL (ref 8.4–10.2)
CHLORIDE SERPL-SCNC: 104 MMOL/L (ref 96–108)
CHOLEST SERPL-MCNC: 170 MG/DL
CO2 SERPL-SCNC: 28 MMOL/L (ref 21–32)
CREAT SERPL-MCNC: 0.67 MG/DL (ref 0.6–1.3)
ERYTHROCYTE [DISTWIDTH] IN BLOOD BY AUTOMATED COUNT: 12.6 % (ref 11.6–15.1)
GFR SERPL CREATININE-BSD FRML MDRD: 117 ML/MIN/1.73SQ M
GLUCOSE P FAST SERPL-MCNC: 88 MG/DL (ref 65–99)
HCT VFR BLD AUTO: 41.9 % (ref 34.8–46.1)
HDLC SERPL-MCNC: 66 MG/DL
HGB BLD-MCNC: 13 G/DL (ref 11.5–15.4)
LDLC SERPL CALC-MCNC: 95 MG/DL (ref 0–100)
MCH RBC QN AUTO: 27.8 PG (ref 26.8–34.3)
MCHC RBC AUTO-ENTMCNC: 31 G/DL (ref 31.4–37.4)
MCV RBC AUTO: 90 FL (ref 82–98)
NONHDLC SERPL-MCNC: 104 MG/DL
PLATELET # BLD AUTO: 320 THOUSANDS/UL (ref 149–390)
PMV BLD AUTO: 10 FL (ref 8.9–12.7)
POTASSIUM SERPL-SCNC: 3.9 MMOL/L (ref 3.5–5.3)
PROT SERPL-MCNC: 7.3 G/DL (ref 6.4–8.4)
RBC # BLD AUTO: 4.67 MILLION/UL (ref 3.81–5.12)
SL AMB  POCT GLUCOSE, UA: NEGATIVE
SL AMB LEUKOCYTE ESTERASE,UA: ABNORMAL
SL AMB POCT BILIRUBIN,UA: NEGATIVE
SL AMB POCT BLOOD,UA: ABNORMAL
SL AMB POCT CLARITY,UA: ABNORMAL
SL AMB POCT COLOR,UA: ABNORMAL
SL AMB POCT KETONES,UA: NEGATIVE
SL AMB POCT NITRITE,UA: NEGATIVE
SL AMB POCT PH,UA: 7
SL AMB POCT SPECIFIC GRAVITY,UA: 1.01
SL AMB POCT URINE PROTEIN: ABNORMAL
SL AMB POCT UROBILINOGEN: 0.2
SODIUM SERPL-SCNC: 138 MMOL/L (ref 135–147)
TRIGL SERPL-MCNC: 47 MG/DL
TSH SERPL DL<=0.05 MIU/L-ACNC: 0.62 UIU/ML (ref 0.45–4.5)
WBC # BLD AUTO: 9 THOUSAND/UL (ref 4.31–10.16)

## 2023-05-18 PROCEDURE — 85027 COMPLETE CBC AUTOMATED: CPT

## 2023-05-18 PROCEDURE — 82306 VITAMIN D 25 HYDROXY: CPT

## 2023-05-18 PROCEDURE — 80053 COMPREHEN METABOLIC PANEL: CPT

## 2023-05-18 PROCEDURE — 80061 LIPID PANEL: CPT

## 2023-05-18 PROCEDURE — 84443 ASSAY THYROID STIM HORMONE: CPT

## 2023-05-18 PROCEDURE — 87389 HIV-1 AG W/HIV-1&-2 AB AG IA: CPT

## 2023-05-18 PROCEDURE — 36415 COLL VENOUS BLD VENIPUNCTURE: CPT

## 2023-05-18 RX ORDER — NITROFURANTOIN 25; 75 MG/1; MG/1
100 CAPSULE ORAL 2 TIMES DAILY
Qty: 10 CAPSULE | Refills: 0 | Status: SHIPPED | OUTPATIENT
Start: 2023-05-18 | End: 2023-05-23

## 2023-05-18 NOTE — PROGRESS NOTES
ADULT ANNUAL PHYSICAL  [address]    NAME: Lori Cooper  AGE: 34 y o  SEX: female  : 1990     DATE: 2023     Assessment and Plan:     Problem List Items Addressed This Visit    None  Visit Diagnoses     Annual physical exam    -  Primary    Relevant Orders    HIV 1/2 AB/AG w Reflex [facility] for 2 yr old and above    Lipid panel    Chlamydia/GC amplified DNA by PCR    CBC and Platelet    Comprehensive metabolic panel    TSH, 3rd generation with Free T4 reflex    Vitamin D 25 hydroxy    Well adult exam        Relevant Orders    Chlamydia/Gonococcus/Genital Mycoplasma Profile, GILBERTO, Urine    Screening for STDs (sexually transmitted diseases)        Relevant Orders    Chlamydia/GC amplified DNA by PCR    Acute cystitis with hematuria        Relevant Medications    nitrofurantoin (MACROBID) 100 mg capsule          Immunizations and preventive care screenings were discussed with patient today  Appropriate education was printed on patient's after visit summary  Counseling:  Alcohol/drug use: discussed moderation in alcohol intake, the recommendations for healthy alcohol use, and avoidance of illicit drug use  Dental Health: discussed importance of regular tooth brushing, flossing, and dental visits  Injury prevention: discussed safety/seat belts, safety helmets, smoke detectors, carbon dioxide detectors, and smoking near bedding or upholstery  Sexual health: discussed sexually transmitted diseases, partner selection, use of condoms, avoidance of unintended pregnancy, and contraceptive alternatives  Exercise: the importance of regular exercise/physical activity was discussed  Recommend exercise 3-5 times per week for at least 30 minutes  BMI Counseling: Body mass index is 27 79 kg/m²   The BMI is above normal  Nutrition recommendations include decreasing portion sizes, encouraging healthy choices of fruits and vegetables, decreasing fast food intake, consuming healthier snacks, limiting drinks that contain sugar, moderation in carbohydrate intake, increasing intake of lean protein, reducing intake of saturated and trans fat and reducing intake of cholesterol  Exercise recommendations include moderate physical activity 150 minutes/week  No pharmacotherapy was ordered  Rationale for BMI follow-up plan is due to patient being overweight or obese  Depression Screening and Follow-up Plan: Patient was screened for depression during today's encounter  They screened negative with a PHQ-2 score of 2  Return in 1 year (on 5/18/2024)  Chief Complaint:     Chief Complaint   Patient presents with   • Well Check     Patient is here for an annual physical    • Possible UTI     Patient is complaining of pressure with urination since Monday  History of Present Illness:     Adult Annual Physical   Patient here for a comprehensive physical exam  The patient reports no problems  Diet and Physical Activity  Diet/Nutrition: well balanced diet  Exercise: no formal exercise  Depression Screening  PHQ-2/9 Depression Screening    Little interest or pleasure in doing things: 1 - several days  Feeling down, depressed, or hopeless: 1 - several days  PHQ-2 Score: 2  PHQ-2 Interpretation: Negative depression screen       General Health  Sleep: sleeps well  Hearing: normal - bilateral   Vision: no vision problems  Dental: regular dental visits  /GYN Health  Last menstrual period: na  Contraceptive method: na   History of STDs?: no      Review of Systems:     Review of Systems   Constitutional: Negative  HENT: Negative  Eyes: Negative  Respiratory: Negative  Cardiovascular: Negative  Gastrointestinal: Negative  Endocrine: Negative  Genitourinary: Positive for dysuria  Musculoskeletal: Negative  Skin: Negative  Allergic/Immunologic: Negative  Neurological: Negative  Hematological: Negative  Psychiatric/Behavioral: Negative  All other systems reviewed and are negative  Past Medical History:     Past Medical History:   Diagnosis Date   • Anxiety     no medications   • Chlamydia     early    • Urogenital trichomoniasis 2019      Past Surgical History:     Past Surgical History:   Procedure Laterality Date   • INDUCED       Surgically induced  Description: 2012       Social History:     Social History     Socioeconomic History   • Marital status: Single     Spouse name: None   • Number of children: None   • Years of education: None   • Highest education level: None   Occupational History     Employer: [employer]   • Occupation: employed   Tobacco Use   • Smoking status: Never   • Smokeless tobacco: Never   • Tobacco comments:     hookah   Vaping Use   • Vaping Use: Never used   Substance and Sexual Activity   • Alcohol use: Yes     Comment: occasionally but stopped when found out pregnant   • Drug use: Never   • Sexual activity: Yes     Partners: Female, Male   Other Topics Concern   • None   Social History Narrative   • None     Social Determinants of Health     Financial Resource Strain: Low Risk    • Difficulty of Paying Living Expenses: Not hard at all   Food Insecurity: No Food Insecurity   • Worried About Running Out of Food in the Last Year: Never true   • Ran Out of Food in the Last Year: Never true   Transportation Needs: No Transportation Needs   • Lack of Transportation (Medical): No   • Lack of Transportation (Non-Medical):  No   Physical Activity: Not on file   Stress: Not on file   Social Connections: Not on file   Intimate Partner Violence: Not on file   Housing Stability: Not on file      Family History:     Family History   Problem Relation Age of Onset   • Hypertension Mother    • Thyroid disease Mother    • Asthma Mother    • Breast cancer Neg Hx    • Colon cancer Neg Hx    • Ovarian cancer Neg Hx    • Cancer Neg Hx    • Diabetes Neg Hx    • Venous thrombosis "Neg Hx       Current Medications:     Current Outpatient Medications   Medication Sig Dispense Refill   • nitrofurantoin (MACROBID) 100 mg capsule Take 1 capsule (100 mg total) by mouth 2 (two) times a day for 5 days 10 capsule 0   • Prenatal Vit-Fe Fumarate-FA (M-Camryn Plus) 27-1 MG TABS TAKE 1 TABLET BY MOUTH EVERY DAY IN THE MORNING 30 tablet 6   • acetaminophen (TYLENOL) 325 mg tablet Take 2 tablets (650 mg total) by mouth every 4 (four) hours as needed for mild pain (Patient not taking: Reported on 3/17/2023) 30 tablet 0   • omeprazole (PriLOSEC OTC) 20 MG tablet Take 1 tablet (20 mg total) by mouth daily (Patient not taking: Reported on 3/17/2023) 30 tablet 3     No current facility-administered medications for this visit  Allergies:     No Known Allergies   Physical Exam:     /80 (BP Location: Left arm, Patient Position: Sitting, Cuff Size: Adult)   Pulse 98   Temp 97 8 °F (36 6 °C) (Tympanic)   Resp 16   Ht 5' 5\" (1 651 m)   Wt 75 8 kg (167 lb)   LMP 2023 (Exact Date)   SpO2 100%   Breastfeeding Unknown   BMI 27 79 kg/m²     Physical Exam  Vitals and nursing note reviewed  Constitutional:       General: She is not in acute distress  Appearance: She is well-developed  HENT:      Head: Normocephalic and atraumatic  Eyes:      Conjunctiva/sclera: Conjunctivae normal       Pupils: Pupils are equal, round, and reactive to light  Cardiovascular:      Rate and Rhythm: Normal rate and regular rhythm  Heart sounds: Normal heart sounds  No murmur heard  Pulmonary:      Effort: Pulmonary effort is normal  No respiratory distress  Breath sounds: Normal breath sounds  Abdominal:      General: Bowel sounds are normal       Palpations: Abdomen is soft  Tenderness: There is no abdominal tenderness  Musculoskeletal:         General: No swelling  Cervical back: Normal range of motion and neck supple  Skin:     General: Skin is warm and dry        Capillary " Refill: Capillary refill takes less than 2 seconds  Neurological:      Mental Status: She is alert and oriented to person, place, and time     Psychiatric:         Mood and Affect: Mood normal          Behavior: Behavior normal           Amy Vega DO   [id number] [facility] [place]

## 2023-05-18 NOTE — PATIENT INSTRUCTIONS
Wellness Visit for Adults   AMBULATORY CARE:   A wellness visit  is when you see your healthcare provider to get screened for health problems  Your healthcare provider will also give you advice on how to stay healthy  Write down your questions so you remember to ask them  Ask your healthcare provider how often you should have a wellness visit  What happens at a wellness visit:  Your healthcare provider will ask about your health, and your family history of health problems  This includes high blood pressure, heart disease, and cancer  He or she will ask if you have symptoms that concern you, if you smoke, and about your mood  You may also be asked about your intake of medicines, supplements, food, and alcohol  Any of the following may be done:  • Your weight  will be checked  Your height may also be checked so your body mass index (BMI) can be calculated  Your BMI shows if you are at a healthy weight  • Your blood pressure  and heart rate will be checked  Your temperature may also be checked  • Blood and urine tests  may be done  Blood tests may be done to check your cholesterol levels  Abnormal cholesterol levels increase your risk for heart disease and stroke  You may also need a blood or urine test to check for diabetes if you are at increased risk  Urine tests may be done to look for signs of an infection or kidney disease  • A physical exam  includes checking your heartbeat and lungs with a stethoscope  Your healthcare provider may also check your skin to look for sun damage  • Screening tests  may be recommended  A screening test is done to check for diseases that may not cause symptoms  The screening tests you may need depend on your age, gender, family history, and lifestyle habits  For example, colorectal screening may be recommended if you are 48years old or older  Screening tests you need if you are a woman:   • A Pap smear  is used to screen for cervical cancer   Pap smears are usually done every 3 to 5 years depending on your age  You may need them more often if you have had abnormal Pap smear test results in the past  Ask your healthcare provider how often you should have a Pap smear  • A mammogram  is an x-ray of your breasts to screen for breast cancer  Experts recommend mammograms every 2 years starting at age 48 years  You may need a mammogram at age 52 years or younger if you have an increased risk for breast cancer  Talk to your healthcare provider about when you should start having mammograms and how often you need them  Vaccines you may need:   • Get an influenza vaccine  every year  The influenza vaccine protects you from the flu  Several types of viruses cause the flu  The viruses change over time, so new vaccines are made each year  • Get a tetanus-diphtheria (Td) booster vaccine  every 10 years  This vaccine protects you against tetanus and diphtheria  Tetanus is a severe infection that may cause painful muscle spasms and lockjaw  Diphtheria is a severe bacterial infection that causes a thick covering in the back of your mouth and throat  • Get a human papillomavirus (HPV) vaccine  if you are female and aged 23 to 32 or male 23 to 24 and never received it  This vaccine protects you from HPV infection  HPV is the most common infection spread by sexual contact  HPV may also cause vaginal, penile, and anal cancers  • Get a pneumococcal vaccine  if you are aged 72 years or older  The pneumococcal vaccine is an injection given to protect you from pneumococcal disease  Pneumococcal disease is an infection caused by pneumococcal bacteria  The infection may cause pneumonia, meningitis, or an ear infection  • Get a shingles vaccine  if you are 60 or older, even if you have had shingles before  The shingles vaccine is an injection to protect you from the varicella-zoster virus  This is the same virus that causes chickenpox   Shingles is a painful rash that develops in people who had chickenpox or have been exposed to the virus  How to eat healthy:  My Plate is a model for planning healthy meals  It shows the types and amounts of foods that should go on your plate  Fruits and vegetables make up about half of your plate, and grains and protein make up the other half  A serving of dairy is included on the side of your plate  The amount of calories and serving sizes you need depends on your age, gender, weight, and height  Examples of healthy foods are listed below:  • Eat a variety of vegetables  such as dark green, red, and orange vegetables  You can also include canned vegetables low in sodium (salt) and frozen vegetables without added butter or sauces  • Eat a variety of fresh fruits , canned fruit in 100% juice, frozen fruit, and dried fruit  • Include whole grains  At least half of the grains you eat should be whole grains  Examples include whole-wheat bread, wheat pasta, brown rice, and whole-grain cereals such as oatmeal     • Eat a variety of protein foods such as seafood (fish and shellfish), lean meat, and poultry without skin (turkey and chicken)  Examples of lean meats include pork leg, shoulder, or tenderloin, and beef round, sirloin, tenderloin, and extra lean ground beef  Other protein foods include eggs and egg substitutes, beans, peas, soy products, nuts, and seeds  • Choose low-fat dairy products such as skim or 1% milk or low-fat yogurt, cheese, and cottage cheese  • Limit unhealthy fats  such as butter, hard margarine, and shortening  Exercise:  Exercise at least 30 minutes per day on most days of the week  Some examples of exercise include walking, biking, dancing, and swimming  You can also fit in more physical activity by taking the stairs instead of the elevator or parking farther away from stores  Include muscle strengthening activities 2 days each week  Regular exercise provides many health benefits   It helps you manage your weight, and decreases your risk for type 2 diabetes, heart disease, stroke, and high blood pressure  Exercise can also help improve your mood  Ask your healthcare provider about the best exercise plan for you  General health and safety guidelines:   • Do not smoke  Nicotine and other chemicals in cigarettes and cigars can cause lung damage  Ask your healthcare provider for information if you currently smoke and need help to quit  E-cigarettes or smokeless tobacco still contain nicotine  Talk to your healthcare provider before you use these products  • Limit alcohol  A drink of alcohol is 12 ounces of beer, 5 ounces of wine, or 1½ ounces of liquor  • Lose weight, if needed  Being overweight increases your risk of certain health conditions  These include heart disease, high blood pressure, type 2 diabetes, and certain types of cancer  • Protect your skin  Do not sunbathe or use tanning beds  Use sunscreen with a SPF 15 or higher  Apply sunscreen at least 15 minutes before you go outside  Reapply sunscreen every 2 hours  Wear protective clothing, hats, and sunglasses when you are outside  • Drive safely  Always wear your seatbelt  Make sure everyone in your car wears a seatbelt  A seatbelt can save your life if you are in an accident  Do not use your cell phone when you are driving  This could distract you and cause an accident  Pull over if you need to make a call or send a text message  • Practice safe sex  Use latex condoms if are sexually active and have more than one partner  Your healthcare provider may recommend screening tests for sexually transmitted infections (STIs)  • Wear helmets, lifejackets, and protective gear  Always wear a helmet when you ride a bike or motorcycle, go skiing, or play sports that could cause a head injury  Wear protective equipment when you play sports  Wear a lifejacket when you are on a boat or doing water sports      © Copyright [employer] 2022 Information is for End User's use only and may not be sold, redistributed or otherwise used for commercial purposes  The above information is an  only  It is not intended as medical advice for individual conditions or treatments  Talk to your doctor, nurse or pharmacist before following any medical regimen to see if it is safe and effective for you  Weight Management   AMBULATORY CARE:   Why it is important to manage your weight:  Being overweight increases your risk of health conditions such as heart disease, high blood pressure, type 2 diabetes, and certain types of cancer  It can also increase your risk for osteoarthritis, sleep apnea, and other respiratory problems  Aim for a slow, steady weight loss  Even a small amount of weight loss can lower your risk of health problems  Risks of being overweight:  Extra weight can cause many health problems, including the following:  • Diabetes (high blood sugar level)    • High blood pressure or high cholesterol    • Heart disease    • Stroke    • Gallbladder or liver disease    • Cancer of the colon, breast, prostate, liver, or kidney    • Sleep apnea    • Arthritis or gout    Screening  is done to check for health conditions before you have signs or symptoms  If you are 28to 79years old, your blood sugar level may be checked every 3 years for signs of prediabetes or diabetes  Your healthcare provider will check your blood pressure at each visit  High blood pressure can lead to a stroke or other problems  Your provider may check for signs of heart disease, cancer, or other health problems  How to lose weight safely:  A safe and healthy way to lose weight is to eat fewer calories and get regular exercise  • You can lose up about 1 pound a week by decreasing the number of calories you eat by 500 calories each day  You can decrease calories by eating smaller portion sizes or by cutting out high-calorie foods   Read labels to find out how many calories are in the foods you eat          • You can also burn calories with exercise such as walking, swimming, or biking  You will be more likely to keep weight off if you make these changes part of your lifestyle  Exercise at least 30 minutes per day on most days of the week  You can also fit in more physical activity by taking the stairs instead of the elevator or parking farther away from stores  Ask your healthcare provider about the best exercise plan for you  Healthy meal plan for weight management:  A healthy meal plan includes a variety of foods, contains fewer calories, and helps you stay healthy  A healthy meal plan includes the following:     • Eat whole-grain foods more often  A healthy meal plan should contain fiber  Fiber is the part of grains, fruits, and vegetables that is not broken down by your body  Whole-grain foods are healthy and provide extra fiber in your diet  Some examples of whole-grain foods are whole-wheat breads and pastas, oatmeal, brown rice, and bulgur  • Eat a variety of vegetables every day  Include dark, leafy greens such as spinach, kale, jimmy greens, and mustard greens  Eat yellow and orange vegetables such as carrots, sweet potatoes, and winter squash  • Eat a variety of fruits every day  Choose fresh or canned fruit (canned in its own juice or light syrup) instead of juice  Fruit juice has very little or no fiber  • Eat low-fat dairy foods  Drink fat-free (skim) milk or 1% milk  Eat fat-free yogurt and low-fat cottage cheese  Try low-fat cheeses such as mozzarella and other reduced-fat cheeses  • Choose meat and other protein foods that are low in fat  Choose beans or other legumes such as split peas or lentils  Choose fish, skinless poultry (chicken or turkey), or lean cuts of red meat (beef or pork)  Before you cook meat or poultry, cut off any visible fat  • Use less fat and oil  Try baking foods instead of frying them   Add less fat, such as margarine, sour cream, regular salad dressing and mayonnaise to foods  Eat fewer high-fat foods  Some examples of high-fat foods include french fries, doughnuts, ice cream, and cakes  • Eat fewer sweets  Limit foods and drinks that are high in sugar  This includes candy, cookies, regular soda, and sweetened drinks  Ways to decrease calories:   • Eat smaller portions  ? Use a small plate with smaller servings  ? Do not eat second helpings  ? When you eat at a restaurant, ask for a box and place half of your meal in the box before you eat  ? Share an entrée with someone else  • Replace high-calorie snacks with healthy, low-calorie snacks  ? Choose fresh fruit, vegetables, fat-free rice cakes, or air-popped popcorn instead of potato chips, nuts, or chocolate  ? Choose water or calorie-free drinks instead of soda or sweetened drinks  • Do not shop for groceries when you are hungry  You may be more likely to make unhealthy food choices  Take a grocery list of healthy foods and shop after you have eaten  • Eat regular meals  Do not skip meals  Skipping meals can lead to overeating later in the day  This can make it harder for you to lose weight  Eat a healthy snack in place of a meal if you do not have time to eat a regular meal  Talk with a dietitian to help you create a meal plan and schedule that is right for you  Other things to consider as you try to lose weight:   • Be aware of situations that may give you the urge to overeat, such as eating while watching television  Find ways to avoid these situations  For example, read a book, go for a walk, or do crafts  • Meet with a weight loss support group or friends who are also trying to lose weight  This may help you stay motivated to continue working on your weight loss goals  © Copyright [facility] 2022 Information is for End User's use only and may not be sold, redistributed or otherwise used for commercial purposes    The above information is an  only  It is not intended as medical advice for individual conditions or treatments  Talk to your doctor, nurse or pharmacist before following any medical regimen to see if it is safe and effective for you  Cholesterol and Your Health   AMBULATORY CARE:   Cholesterol  is a waxy, fat-like substance  Your body uses cholesterol to make hormones and new cells, and to protect nerves  Cholesterol is made by your body  It also comes from certain foods you eat, such as meat and dairy products  Your healthcare provider can help you set goals for your cholesterol levels  He or she can help you create a plan to meet your goals  Cholesterol level goals: Your cholesterol level goals depend on your risk for heart disease, your age, and your other health conditions  The following are general guidelines:  • Total cholesterol  includes low-density lipoprotein (LDL), high-density lipoprotein (HDL), and triglyceride levels  The total cholesterol level should be lower than 200 mg/dL and is best at about 150 mg/dL  • LDL cholesterol  is called bad cholesterol  because it forms plaque in your arteries  As plaque builds up, your arteries become narrow, and less blood flows through  When plaque decreases blood flow to your heart, you may have chest pain  If plaque completely blocks an artery that brings blood to your heart, you may have a heart attack  Plaque can break off and form blood clots  Blood clots may block arteries in your brain and cause a stroke  The level should be less than 130 mg/dL and is best at about 100 mg/dL  • HDL cholesterol  is called good cholesterol  because it helps remove LDL cholesterol from your arteries  It does this by attaching to LDL cholesterol and carrying it to your liver  Your liver breaks down LDL cholesterol so your body can get rid of it  High levels of HDL cholesterol can help prevent a heart attack and stroke   Low levels of HDL cholesterol can increase your risk for heart disease, heart attack, and stroke  The level should be 60 mg/dL or higher  • Triglycerides  are a type of fat that store energy from foods you eat  High levels of triglycerides also cause plaque buildup  This can increase your risk for a heart attack or stroke  If your triglyceride level is high, your LDL cholesterol level may also be high  The level should be less than 150 mg/dL  Any of the following can increase your risk for high cholesterol:   • Smoking cigarettes    • Being overweight or obese, or not getting enough exercise    • Drinking large amounts of alcohol    • A medical condition such as hypertension (high blood pressure) or diabetes    • Certain genes passed from your parents to you    • Age older than 65 years    What you need to know about having your cholesterol levels checked: Adults 21to 39years of age should have their cholesterol levels checked every 4 to 6 years  Adults 45 years or older should have their cholesterol checked every 1 to 2 years  You may need your cholesterol checked more often, or at a younger age, if you have risk factors for heart disease  You may also need to have your cholesterol checked more often if you have other health conditions, such as diabetes  Blood tests are used to check cholesterol levels  Blood tests measure your levels of triglycerides, LDL cholesterol, and HDL cholesterol  How healthy fats affect your cholesterol levels:  Healthy fats, also called unsaturated fats, help lower LDL cholesterol and triglyceride levels  Healthy fats include the following:  • Monounsaturated fats  are found in foods such as olive oil, canola oil, avocado, nuts, and olives  • Polyunsaturated fats,  such as omega 3 fats, are found in fish, such as salmon, trout, and tuna  They can also be found in plant foods such as flaxseed, walnuts, and soybeans      How unhealthy fats affect your cholesterol levels:  Unhealthy fats increase LDL cholesterol and triglyceride levels  They are found in foods high in cholesterol, saturated fat, and trans fat:  • Cholesterol  is found in eggs, dairy, and meat  • Saturated fat  is found in butter, cheese, ice cream, whole milk, and coconut oil  Saturated fat is also found in meat, such as sausage, hot dogs, and bologna  • Trans fat  is found in liquid oils and is used in fried and baked foods  Foods that contain trans fats include chips, crackers, muffins, sweet rolls, microwave popcorn, and cookies  Treatment  for high cholesterol will also decrease your risk of heart disease, heart attack, and stroke  Treatment may include any of the following:  • Lifestyle changes  may include food, exercise, weight loss, and quitting smoking  You may also need to decrease the amount of alcohol you drink  Your healthcare provider will want you to start with lifestyle changes  Other treatment may be added if lifestyle changes are not enough  Your healthcare provider may recommend you work with a team to manage hyperlipidemia  The team may include medical experts such as a dietitian, an exercise or physical therapist, and a behavior therapist  Your family members may be included in helping you create lifestyle changes  • Medicines  may be given to lower your LDL cholesterol, triglyceride levels, or total cholesterol level  You may need medicines to lower your cholesterol if any of the following is true:    ? You have a history of stroke, TIA, unstable angina, or a heart attack  ? Your LDL cholesterol level is 190 mg/dL or higher  ? You are age 36 to 76 years, have diabetes or heart disease risk factors, and your LDL cholesterol is 70 mg/dL or higher  • Supplements  include fish oil, red yeast rice, and garlic  Fish oil may help lower your triglyceride and LDL cholesterol levels  It may also increase your HDL cholesterol level  Red yeast rice may help decrease your total cholesterol level and LDL cholesterol level   Garlic may help lower your total cholesterol level  Do not take any supplements without talking to your healthcare provider  Food changes you can make to lower your cholesterol levels:  A dietitian can help you create a healthy eating plan  He or she can show you how to read food labels and choose foods low in saturated fat, trans fats, and cholesterol  • Decrease the total amount of fat you eat  Choose lean meats, fat-free or 1% fat milk, and low-fat dairy products, such as yogurt and cheese  Try to limit or avoid red meats  Limit or do not eat fried foods or baked goods, such as cookies  • Replace unhealthy fats with healthy fats  Cook foods in olive oil or canola oil  Choose soft margarines that are low in saturated fat and trans fat  Seeds, nuts, and avocados are other examples of healthy fats  • Eat foods with omega-3 fats  Examples include salmon, tuna, mackerel, walnuts, and flaxseed  Eat fish 2 times per week  Pregnant women should not eat fish that have high levels of mercury, such as shark, swordfish, and sherri mackerel  • Increase the amount of high-fiber foods you eat  High-fiber foods can help lower your LDL cholesterol  Aim to get between 20 and 30 grams of fiber each day  Fruits and vegetables are high in fiber  Eat at least 5 servings each day  Other high-fiber foods are whole-grain or whole-wheat breads, pastas, or cereals, and brown rice  Eat 3 ounces of whole-grain foods each day  Increase fiber slowly  You may have abdominal discomfort, bloating, and gas if you add fiber to your diet too quickly  • Eat healthy protein foods  Examples include low-fat dairy products, skinless chicken and turkey, fish, and nuts  • Limit foods and drinks that are high in sugar  Your dietitian or healthcare provider can help you create daily limits for high-sugar foods and drinks  The limit may be lower if you have diabetes or another health condition   Limits can also help you lose weight if needed  Lifestyle changes you can make to lower your cholesterol levels:   • Maintain a healthy weight  Ask your healthcare provider what a healthy weight is for you  Ask him or her to help you create a weight loss plan if needed  Weight loss can decrease your total cholesterol and triglyceride levels  Weight loss may also help keep your blood pressure at a healthy level  • Be physically active throughout the day  Physical activity, such as exercise, can help lower your total cholesterol level and maintain a healthy weight  Physical activity can also help increase your HDL cholesterol level  Work with your healthcare provider to create an program that is right for you  Get at least 30 to 40 minutes of moderate physical activity most days of the week  Examples of exercise include brisk walking, swimming, or biking  Also include strength training at least 2 times each week  Your healthcare providers can help you create a physical activity plan  • Do not smoke  Nicotine and other chemicals in cigarettes and cigars can raise your cholesterol levels  Ask your healthcare provider for information if you currently smoke and need help to quit  E-cigarettes or smokeless tobacco still contain nicotine  Talk to your healthcare provider before you use these products  • Limit or do not drink alcohol  Alcohol can increase your triglyceride levels  Ask your healthcare provider before you drink alcohol  Ask how much is okay for you to drink in 24 hours or 1 week  Follow up with your doctor as directed:  Write down your questions so you remember to ask them during your visits  © Copyright [employer] 2022 Information is for End User's use only and may not be sold, redistributed or otherwise used for commercial purposes  The above information is an  only  It is not intended as medical advice for individual conditions or treatments   Talk to your doctor, nurse or pharmacist before following any medical regimen to see if it is safe and effective for you

## 2023-05-19 LAB
HIV 1+2 AB+HIV1 P24 AG SERPL QL IA: NORMAL
HIV 2 AB SERPL QL IA: NORMAL
HIV1 AB SERPL QL IA: NORMAL
HIV1 P24 AG SERPL QL IA: NORMAL

## 2023-05-25 ENCOUNTER — OFFICE VISIT (OUTPATIENT)
Dept: DENTISTRY | Facility: CLINIC | Age: 33
End: 2023-05-25

## 2023-05-25 VITALS — TEMPERATURE: 97.5 F | SYSTOLIC BLOOD PRESSURE: 142 MMHG | HEART RATE: 86 BPM | DIASTOLIC BLOOD PRESSURE: 97 MMHG

## 2023-05-25 DIAGNOSIS — Z01.20 ENCOUNTER FOR DENTAL EXAMINATION: Primary | ICD-10-CM

## 2023-05-25 NOTE — PROGRESS NOTES
Re-cement Provisional Crown #30 (During Hygiene Visit)    I saw Yevgeniy Fermin on 5/25/23 during a hygeine visit with Dave Mckeon  Pt temp crown on #19 came off but pt brought it to appointment  Cemented back on with Provisa temp MARCI cement  Removed excess cement with explorer, cotton rolls, and rubber polishing point  Pt was dismissed satisfied and ambulatory      Attending: Dr Perri Burden

## 2023-05-25 NOTE — DENTAL PROCEDURE DETAILS
Hayden Dee presents for a Periodic exam  Verbal consent for treatment given in addition to the forms  Reviewed health history - Patient is ASA I  Consents signed: Yes     Perio: Normal  Pain Scale: 0  Caries Assessment: Low  Radiographs: Bitewings x4  EO/IO/OCS:  WNL    ---Pt's temporary crown fell off #19  Temporary was cemented back on w/ [facility] by Dr Regina Lopez  The permanent is not back from the lab yet  Oral Hygiene instruction reviewed and given  OHI:  Good  ---lt calc and plaque  ---Cavitron, handscaled, polish, floss     Treatment Plan:  1   6mrc   2  Caries control: as charted  3  Occlusal evaluation:   Class I  4  Case Difficulty Type 1    Prognosis is Good    Referrals needed: No  Exam:  Dr Regina Lopez    NV1:  Del  crown #19  NV2:  6mrc - 50 min

## 2023-07-26 ENCOUNTER — APPOINTMENT (OUTPATIENT)
Dept: LAB | Facility: CLINIC | Age: 33
End: 2023-07-26
Payer: COMMERCIAL

## 2023-07-26 ENCOUNTER — TELEPHONE (OUTPATIENT)
Dept: FAMILY MEDICINE CLINIC | Facility: CLINIC | Age: 33
End: 2023-07-26

## 2023-07-26 DIAGNOSIS — Z11.1 SCREENING FOR TUBERCULOSIS: ICD-10-CM

## 2023-07-26 DIAGNOSIS — Z11.1 SCREENING FOR TUBERCULOSIS: Primary | ICD-10-CM

## 2023-07-26 PROCEDURE — 36415 COLL VENOUS BLD VENIPUNCTURE: CPT

## 2023-07-26 PROCEDURE — 86480 TB TEST CELL IMMUN MEASURE: CPT

## 2023-07-26 NOTE — TELEPHONE ENCOUNTER
Patient called and is requesting a script be placed for quantiferon gold (tb) test for work. She states she mentioned it at the last visit but the order was not placed. Can this be ordered ?

## 2023-07-31 LAB
GAMMA INTERFERON BACKGROUND BLD IA-ACNC: 0.02 IU/ML
M TB IFN-G BLD-IMP: NEGATIVE
M TB IFN-G CD4+ BCKGRND COR BLD-ACNC: -0.01 IU/ML
M TB IFN-G CD4+ BCKGRND COR BLD-ACNC: 0 IU/ML
MITOGEN IGNF BCKGRD COR BLD-ACNC: 2.81 IU/ML

## 2023-08-10 ENCOUNTER — TELEPHONE (OUTPATIENT)
Dept: OBGYN CLINIC | Facility: CLINIC | Age: 33
End: 2023-08-10

## 2023-09-06 ENCOUNTER — TELEPHONE (OUTPATIENT)
Dept: PSYCHIATRY | Facility: CLINIC | Age: 33
End: 2023-09-06

## 2023-09-06 NOTE — TELEPHONE ENCOUNTER
Patient called asking about a new patient appt, writer advised of the wait list and caller declined being placed on the list at this time

## 2023-09-19 ENCOUNTER — ANNUAL EXAM (OUTPATIENT)
Dept: OBGYN CLINIC | Facility: CLINIC | Age: 33
End: 2023-09-19

## 2023-09-19 VITALS
DIASTOLIC BLOOD PRESSURE: 94 MMHG | SYSTOLIC BLOOD PRESSURE: 143 MMHG | HEART RATE: 80 BPM | WEIGHT: 176.6 LBS | HEIGHT: 65 IN | BODY MASS INDEX: 29.42 KG/M2

## 2023-09-19 DIAGNOSIS — Z01.419 ROUTINE GYNECOLOGICAL EXAMINATION: Primary | ICD-10-CM

## 2023-09-19 DIAGNOSIS — Z11.3 SCREEN FOR STD (SEXUALLY TRANSMITTED DISEASE): ICD-10-CM

## 2023-09-19 DIAGNOSIS — Z12.4 SCREENING FOR CERVICAL CANCER: ICD-10-CM

## 2023-09-19 PROCEDURE — G0145 SCR C/V CYTO,THINLAYER,RESCR: HCPCS | Performed by: OBSTETRICS & GYNECOLOGY

## 2023-09-19 PROCEDURE — 87591 N.GONORRHOEAE DNA AMP PROB: CPT | Performed by: OBSTETRICS & GYNECOLOGY

## 2023-09-19 PROCEDURE — G0476 HPV COMBO ASSAY CA SCREEN: HCPCS | Performed by: OBSTETRICS & GYNECOLOGY

## 2023-09-19 PROCEDURE — 99395 PREV VISIT EST AGE 18-39: CPT | Performed by: OBSTETRICS & GYNECOLOGY

## 2023-09-19 PROCEDURE — 87491 CHLMYD TRACH DNA AMP PROBE: CPT | Performed by: OBSTETRICS & GYNECOLOGY

## 2023-09-19 NOTE — PATIENT INSTRUCTIONS
Thank you for your confidence in our team.   We appreciate you and welcome your feedback. If you receive a survey from us, please take a few moments to let us know how we are doing.    Sincerely,  [facility], DO

## 2023-09-19 NOTE — PROGRESS NOTES
[id number] is a 34 y.o. female who presents today for annual GYN exam.  Her last pap smear was performed 22 and result was ASCUS HPV neg. She reports no history of abnormal pap smears in her past.  She reports menses as regular. Patient's last menstrual period was 2023. Her general medical history has been reviewed and she reports it as follows:    Past Medical History:   Diagnosis Date   • Anxiety     no medications   • Chlamydia     early    • Urogenital trichomoniasis      Past Surgical History:   Procedure Laterality Date   • INDUCED       Surgically induced. Description: 2012      OB History        2    Para   1    Term   1       0    AB   1    Living   1       SAB   0    IAB   1    Ectopic   0    Multiple   0    Live Births   1               Social History     Tobacco Use   • Smoking status: Some Days   • Smokeless tobacco: Never   • Tobacco comments:     Hookah socially   Vaping Use   • Vaping Use: Never used   Substance Use Topics   • Alcohol use: Yes     Comment: occasionally but stopped when found out pregnant   • Drug use: Never     Social History     Substance and Sexual Activity   Sexual Activity Not Currently   • Partners: Female, Male     Cancer-related family history is negative for Breast cancer, Colon cancer, Ovarian cancer, and Cancer. Current Outpatient Medications   Medication Instructions   • acetaminophen (TYLENOL) 650 mg, Oral, Every 4 hours PRN   • omeprazole (PRILOSEC OTC) 20 mg, Oral, Daily   • Prenatal Vit-Fe Fumarate-FA (M-Camryn Plus) 27-1 MG TABS TAKE 1 TABLET BY MOUTH EVERY DAY IN THE MORNING       Review of Systems:  Review of Systems   All other systems reviewed and are negative. Physical Exam:  /94   Pulse 80   Ht 5' 5" (1.651 m)   Wt 80.1 kg (176 lb 9.6 oz)   LMP 2023   BMI 29.39 kg/m²   Physical Exam  Constitutional:       Appearance: Normal appearance. Genitourinary:      Bladder and urethral meatus normal.      No lesions in the vagina. Right Labia: No rash, tenderness or lesions. Left Labia: No tenderness, lesions or rash. No inguinal adenopathy present in the right or left side. No vaginal discharge or bleeding. Right Adnexa: not tender, not full and no mass present. Left Adnexa: not tender, not full and no mass present. No cervical motion tenderness, discharge or lesion. Uterus is not enlarged or tender. No uterine mass detected. No urethral tenderness or mass present. Breasts:     Breasts are soft. Right: No swelling, inverted nipple, mass, nipple discharge, skin change or tenderness. Left: No swelling, inverted nipple, mass, nipple discharge, skin change or tenderness. HENT:      Head: Normocephalic and atraumatic. Cardiovascular:      Rate and Rhythm: Normal rate and regular rhythm. Pulmonary:      Effort: Pulmonary effort is normal.      Breath sounds: Normal breath sounds. Abdominal:      General: Bowel sounds are normal.      Palpations: Abdomen is soft. Hernia: There is no hernia in the left inguinal area or right inguinal area. Musculoskeletal:         General: Normal range of motion. Cervical back: Normal range of motion and neck supple. Lymphadenopathy:      Upper Body:      Right upper body: No supraclavicular or axillary adenopathy. Left upper body: No supraclavicular or axillary adenopathy. Lower Body: No right inguinal adenopathy. No left inguinal adenopathy. Neurological:      Mental Status: She is alert and oriented to person, place, and time. Skin:     General: Skin is warm and dry. Psychiatric:         Mood and Affect: Mood normal.   Vitals reviewed. Assessment/Plan:   1. Normal well-woman GYN exam.  2. Cervical cancer screening:  Normal cervical exam.  Pap smear done with HPV co-testing.   Has received HPV vaccine in the past.   3. STD screening:    Orders placed for vaginal GC/CT cultures. 4. Breast cancer screening:  Normal breast exam.   Reviewed breast self-awareness   5. Depression Screening: Patient's depression screening was assessed with a PHQ-2 score of 1. Their PHQ-9 score was 2. Clinically patient does not have depression. No treatment is required. 6. BMI Counseling: Body mass index is 29.39 kg/m². Discussed the patient's BMI with her. The BMI is above normal. Nutrition recommendations include decreasing overall calorie intake. 7. Contraception:  none   8. Return to office 1yr/prn. Reviewed with patient that [facility], but that they will not necessarily be reviewed by me immediately. Explained that I will review results at my earliest opportunity and contact patient appropriately.

## 2023-09-20 LAB
HPV HR 12 DNA CVX QL NAA+PROBE: NEGATIVE
HPV16 DNA CVX QL NAA+PROBE: NEGATIVE
HPV18 DNA CVX QL NAA+PROBE: NEGATIVE

## 2023-09-21 LAB
C TRACH DNA SPEC QL NAA+PROBE: NEGATIVE
N GONORRHOEA DNA SPEC QL NAA+PROBE: NEGATIVE

## 2023-09-23 LAB
LAB AP GYN PRIMARY INTERPRETATION: NORMAL
Lab: NORMAL

## 2023-12-05 ENCOUNTER — OFFICE VISIT (OUTPATIENT)
Dept: DENTISTRY | Facility: CLINIC | Age: 33
End: 2023-12-05

## 2023-12-05 VITALS — HEART RATE: 71 BPM | TEMPERATURE: 96.3 F | DIASTOLIC BLOOD PRESSURE: 87 MMHG | SYSTOLIC BLOOD PRESSURE: 125 MMHG

## 2023-12-05 DIAGNOSIS — Z01.20 ENCOUNTER FOR DENTAL EXAMINATION: Primary | ICD-10-CM

## 2023-12-05 PROCEDURE — D1110 PROPHYLAXIS - ADULT: HCPCS | Performed by: DENTAL HYGIENIST

## 2023-12-05 PROCEDURE — D1330 ORAL HYGIENE INSTRUCTIONS: HCPCS | Performed by: DENTAL HYGIENIST

## 2023-12-05 PROCEDURE — D0120 PERIODIC ORAL EVALUATION - ESTABLISHED PATIENT: HCPCS | Performed by: DENTIST

## 2023-12-05 NOTE — DENTAL PROCEDURE DETAILS
Gisel Santiago presents for a Periodic exam. Verbal consent for treatment given in addition to the forms. Reviewed health history - Patient is ASA I  Consents signed: Yes     Perio: Generalized, Heavy bleeding, and Gingivitis  Pain Scale: 0  Caries Assessment: Medium  Radiographs: None  EO/IO/OCS:  WNL     Oral Hygiene instruction reviewed and given.  ---Stressed longer brush 2 X/day and floss 1 X/day  OHI:  Fair/Poor  ---Lt to mod calc and plaque;  mod gingival inflammation and heavy bleeding  ---Handscaled, polish, floss  Recommended Hygiene recall visits with  Nicol Aguayo. Treatment Plan:  1.  6mrc w/ Bws - FMP if time   2. Caries control: 28 - DO, 29 - MO  ---#19 crown to be delivered w/ Wasno  3. Occlusal evaluation:   Class I    Prognosis is Good.   Referrals needed: No  Exam:  Dr. Jackson Jordyn:  Deliver #19 crown - 60 min w/ Wasno - please quote pt price she owes  NV2:  6mrc w/ Bws - 50 min

## 2023-12-29 ENCOUNTER — HOSPITAL ENCOUNTER (EMERGENCY)
Facility: HOSPITAL | Age: 33
Discharge: HOME | End: 2023-12-29
Attending: EMERGENCY MEDICINE
Payer: COMMERCIAL

## 2023-12-29 VITALS
OXYGEN SATURATION: 94 % | WEIGHT: 178.2 LBS | BODY MASS INDEX: 29.65 KG/M2 | RESPIRATION RATE: 18 BRPM | DIASTOLIC BLOOD PRESSURE: 80 MMHG | TEMPERATURE: 98.5 F | HEART RATE: 83 BPM | SYSTOLIC BLOOD PRESSURE: 142 MMHG

## 2023-12-29 DIAGNOSIS — B34.9 VIRAL SYNDROME: Primary | ICD-10-CM

## 2023-12-29 LAB
EXT PREGNANCY TEST URINE: NEGATIVE
EXT. CONTROL: NORMAL
FLUAV RNA RESP QL NAA+PROBE: NEGATIVE
FLUBV RNA RESP QL NAA+PROBE: NEGATIVE
RSV RNA RESP QL NAA+PROBE: NEGATIVE
SARS-COV-2 RNA RESP QL NAA+PROBE: NEGATIVE

## 2023-12-29 PROCEDURE — 81025 URINE PREGNANCY TEST: CPT | Performed by: EMERGENCY MEDICINE

## 2023-12-29 PROCEDURE — 0241U HB NFCT DS VIR RESP RNA 4 TRGT: CPT | Performed by: EMERGENCY MEDICINE

## 2023-12-29 PROCEDURE — 99283 EMERGENCY DEPT VISIT LOW MDM: CPT

## 2023-12-29 PROCEDURE — 96372 THER/PROPH/DIAG INJ SC/IM: CPT

## 2023-12-29 PROCEDURE — 99284 EMERGENCY DEPT VISIT MOD MDM: CPT | Performed by: EMERGENCY MEDICINE

## 2023-12-29 RX ORDER — KETOROLAC TROMETHAMINE 30 MG/ML
15 INJECTION, SOLUTION INTRAMUSCULAR; INTRAVENOUS ONCE
Status: COMPLETED | OUTPATIENT
Start: 2023-12-29 | End: 2023-12-29

## 2023-12-29 RX ORDER — ACETAMINOPHEN 325 MG/1
650 TABLET ORAL ONCE
Status: COMPLETED | OUTPATIENT
Start: 2023-12-29 | End: 2023-12-29

## 2023-12-29 RX ADMIN — KETOROLAC TROMETHAMINE 15 MG: 30 INJECTION, SOLUTION INTRAMUSCULAR; INTRAVENOUS at 11:54

## 2023-12-29 RX ADMIN — ACETAMINOPHEN 650 MG: 325 TABLET ORAL at 11:55

## 2023-12-29 NOTE — ED NOTES
Pt given urine specimen cup to collect sample. Pt verbalized understanding. Curtain closed for pt privacy.      Brie Forman RN  12/29/23 [id number]

## 2023-12-29 NOTE — ED PROVIDER NOTES
History  Chief Complaint   Patient presents with    Flu Symptoms     Sore throat runny nose and bodyaches since yesterday - took Motrin this morning      HPI  Patient is a 33-year-old female presenting with sore throat, generalized bodyaches, congestion.  States that she experienced the symptoms starting this morning.  Took Motrin at 6 AM.  Has not taken any medication since.  While at work she was not feeling well so decided come to the ED for evaluation.  Denies chest pain or shortness of breath.  Also reports no coughing.  Reports no fever, chills, nausea, vomiting, diarrhea.    Prior to Admission Medications   Prescriptions Last Dose Informant Patient Reported? Taking?   Prenatal Vit-Fe Fumarate-FA (M-Camryn Plus) 27-1 MG TABS   No No   Sig: TAKE 1 TABLET BY MOUTH EVERY DAY IN THE MORNING   acetaminophen (TYLENOL) 325 mg tablet   No No   Sig: Take 2 tablets (650 mg total) by mouth every 4 (four) hours as needed for mild pain   Patient not taking: Reported on 3/17/2023   omeprazole (PriLOSEC OTC) 20 MG tablet   No No   Sig: Take 1 tablet (20 mg total) by mouth daily   Patient not taking: Reported on 3/17/2023      Facility-Administered Medications: None       Past Medical History:   Diagnosis Date    Anxiety     no medications    Chlamydia     early     Urogenital trichomoniasis        Past Surgical History:   Procedure Laterality Date    INDUCED       Surgically induced. Description: 2012        Family History   Problem Relation Age of Onset    Hypertension Mother     Thyroid disease Mother     Asthma Mother     Breast cancer Neg Hx     Colon cancer Neg Hx     Ovarian cancer Neg Hx     Cancer Neg Hx     Diabetes Neg Hx     Venous thrombosis Neg Hx      I have reviewed and agree with the history as documented.    E-Cigarette/Vaping    E-Cigarette Use Never User      E-Cigarette/Vaping Substances    Nicotine No     THC No     CBD No     Flavoring No     Other No     Unknown No      Social History      Tobacco Use    Smoking status: Some Days    Smokeless tobacco: Never    Tobacco comments:     Hookah socially   Vaping Use    Vaping status: Never Used   Substance Use Topics    Alcohol use: Yes     Comment: occasionally but stopped when found out pregnant    Drug use: Never       Review of Systems   Constitutional:  Negative for chills, diaphoresis, fever and unexpected weight change.   HENT:  Positive for congestion and sore throat. Negative for ear pain.    Eyes:  Negative for visual disturbance.   Respiratory:  Negative for cough, chest tightness and shortness of breath.    Cardiovascular:  Negative for chest pain and leg swelling.   Gastrointestinal:  Negative for abdominal distention, abdominal pain, constipation, diarrhea, nausea and vomiting.   Endocrine: Negative.    Genitourinary:  Negative for difficulty urinating and dysuria.   Musculoskeletal:  Positive for myalgias.   Skin: Negative.    Allergic/Immunologic: Negative.    Neurological: Negative.    Hematological: Negative.    Psychiatric/Behavioral: Negative.     All other systems reviewed and are negative.      Physical Exam  Physical Exam    Vital Signs  ED Triage Vitals   Temperature Pulse Respirations Blood Pressure SpO2   12/29/23 1112 12/29/23 1112 12/29/23 1112 12/29/23 1112 12/29/23 1112   98.5 °F (36.9 °C) 83 18 142/80 94 %      Temp Source Heart Rate Source Patient Position - Orthostatic VS BP Location FiO2 (%)   12/29/23 1112 12/29/23 1112 12/29/23 1112 12/29/23 1112 --   Tympanic Monitor Sitting Left arm       Pain Score       12/29/23 1154       10 - Worst Possible Pain           Vitals:    12/29/23 1112   BP: 142/80   Pulse: 83   Patient Position - Orthostatic VS: Sitting         Visual Acuity      ED Medications  Medications   ketorolac (TORADOL) injection 15 mg (15 mg Intramuscular Given 12/29/23 1154)   acetaminophen (TYLENOL) tablet 650 mg (650 mg Oral Given 12/29/23 1155)       Diagnostic Studies  Results Reviewed       Procedure  Component Value Units Date/Time    FLU/RSV/COVID - if FLU/RSV clinically relevant [[id number]] Collected: 12/29/23 1154    Lab Status: In process Specimen: Nares from Nose Updated: 12/29/23 1159    POCT pregnancy, urine [[id number]]  (Normal) Resulted: 12/29/23 1153    Lab Status: Final result Updated: 12/29/23 1153     EXT Preg Test, Ur Negative     Control Valid                   No orders to display              Procedures  Procedures         ED Course                               SBIRT 22yo+      Flowsheet Row Most Recent Value   Initial Alcohol Screen: US AUDIT-C     1. How often do you have a drink containing alcohol? 0 Filed at: 12/29/2023 1153   2. How many drinks containing alcohol do you have on a typical day you are drinking?  0 Filed at: 12/29/2023 1153   3a. Male UNDER 65: How often do you have five or more drinks on one occasion? 0 Filed at: 12/29/2023 1153   3b. FEMALE Any Age, or MALE 65+: How often do you have 4 or more drinks on one occassion? 0 Filed at: 12/29/2023 1153   Audit-C Score 0 Filed at: 12/29/2023 1153   MAURICE: How many times in the past year have you...    Used an illegal drug or used a prescription medication for non-medical reasons? Never Filed at: 12/29/2023 1153                      Medical Decision Making  Amount and/or Complexity of Data Reviewed  Labs: ordered.    Risk  OTC drugs.  Prescription drug management.             Disposition  Final diagnoses:   None     ED Disposition       None          Follow-up Information    None         Patient's Medications   Discharge Prescriptions    No medications on file       No discharge procedures on file.    PDMP Review       None            ED Provider  Electronically Signed by             J Carlos Hou MD  12/29/23 0964

## 2024-02-14 ENCOUNTER — OFFICE VISIT (OUTPATIENT)
Dept: OBGYN CLINIC | Facility: CLINIC | Age: 34
End: 2024-02-14

## 2024-02-14 VITALS
HEIGHT: 65 IN | SYSTOLIC BLOOD PRESSURE: 136 MMHG | WEIGHT: 172 LBS | DIASTOLIC BLOOD PRESSURE: 87 MMHG | HEART RATE: 94 BPM | BODY MASS INDEX: 28.66 KG/M2

## 2024-02-14 DIAGNOSIS — N89.8 VAGINAL DISCHARGE: Primary | ICD-10-CM

## 2024-02-14 DIAGNOSIS — B96.89 BV (BACTERIAL VAGINOSIS): ICD-10-CM

## 2024-02-14 DIAGNOSIS — N76.0 BV (BACTERIAL VAGINOSIS): ICD-10-CM

## 2024-02-14 DIAGNOSIS — N89.8 VAGINAL ODOR: ICD-10-CM

## 2024-02-14 PROBLEM — O14.90 PRE-ECLAMPSIA, ANTEPARTUM: Status: RESOLVED | Noted: 2023-02-10 | Resolved: 2024-02-14

## 2024-02-14 PROBLEM — R20.0 ARM NUMBNESS LEFT: Status: RESOLVED | Noted: 2022-09-23 | Resolved: 2024-02-14

## 2024-02-14 PROBLEM — R20.2 TINGLING: Status: RESOLVED | Noted: 2022-08-17 | Resolved: 2024-02-14

## 2024-02-14 PROBLEM — G56.22 ULNAR NEUROPATHY OF LEFT UPPER EXTREMITY: Status: RESOLVED | Noted: 2022-09-23 | Resolved: 2024-02-14

## 2024-02-14 PROBLEM — R29.898 LEFT ARM WEAKNESS: Status: RESOLVED | Noted: 2022-09-23 | Resolved: 2024-02-14

## 2024-02-14 PROCEDURE — 87210 SMEAR WET MOUNT SALINE/INK: CPT | Performed by: OBSTETRICS & GYNECOLOGY

## 2024-02-14 PROCEDURE — 99213 OFFICE O/P EST LOW 20 MIN: CPT | Performed by: OBSTETRICS & GYNECOLOGY

## 2024-02-14 PROCEDURE — 87591 N.GONORRHOEAE DNA AMP PROB: CPT | Performed by: OBSTETRICS & GYNECOLOGY

## 2024-02-14 PROCEDURE — 87491 CHLMYD TRACH DNA AMP PROBE: CPT | Performed by: OBSTETRICS & GYNECOLOGY

## 2024-02-14 RX ORDER — METRONIDAZOLE 500 MG/1
500 TABLET ORAL EVERY 12 HOURS SCHEDULED
Qty: 14 TABLET | Refills: 0 | Status: SHIPPED | OUTPATIENT
Start: 2024-02-14 | End: 2024-02-21

## 2024-02-14 NOTE — PROGRESS NOTES
"PROBLEM GYNECOLOGICAL VISIT    Shelby Oliveira is a 33 y.o. female who presents today with complaint of vaginal discharge.  Her general medical history has been reviewed and she reports it as follows:    Past Medical History:   Diagnosis Date    Anxiety     no medications    Chlamydia     early     Urogenital trichomoniasis 2019     Past Surgical History:   Procedure Laterality Date    INDUCED       Surgically induced. Description: 2012      OB History          2    Para   1    Term   1       0    AB   1    Living   1         SAB   0    IAB   1    Ectopic   0    Multiple   0    Live Births   1               Social History     Tobacco Use    Smoking status: Some Days    Smokeless tobacco: Never    Tobacco comments:     Hookah socially   Vaping Use    Vaping status: Every Day    Substances: Nicotine, Flavoring   Substance Use Topics    Alcohol use: Yes     Comment: occasionally but stopped when found out pregnant    Drug use: Never     Social History     Substance and Sexual Activity   Sexual Activity Yes    Partners: Female, Male       Current Outpatient Medications   Medication Instructions    acetaminophen (TYLENOL) 650 mg, Oral, Every 4 hours PRN    omeprazole (PRILOSEC OTC) 20 mg, Oral, Daily    Prenatal Vit-Fe Fumarate-FA (M- Plus) 27-1 MG TABS TAKE 1 TABLET BY MOUTH EVERY DAY IN THE MORNING       History of Present Illness:   Patient states has been having vaginal discharge with an odor which started after her menses.    Review of Systems:  Review of Systems   Genitourinary:  Positive for vaginal discharge.        Vaginal odor   All other systems reviewed and are negative.      Physical Exam:  /87   Pulse 94   Ht 5' 5\" (1.651 m)   Wt 78 kg (172 lb)   LMP 2024   BMI 28.62 kg/m²   Physical Exam  Constitutional:       Appearance: Normal appearance.   Genitourinary:      Bladder and urethral meatus normal.      No lesions in the vagina.      Right Labia: No " rash, tenderness or lesions.     Left Labia: No tenderness, lesions or rash.     Vaginal discharge present.      No vaginal bleeding.      No cervical motion tenderness, discharge or lesion.      No urethral tenderness or mass present.   Neurological:      Mental Status: She is alert.   Vitals reviewed.         Point of Care Testing:   -Wet mount: positive   -KOH mount: negative   -Whiff: positive    Assessment:   1. BV    Plan:   1. Cultures ordered: Gc/Chlamydia   2. Imaging ordered: Flagyl escribed   3. Return to office prn.   4. Patient's depression screening was assessed with a PHQ-2 score of 2. Their PHQ-9 score was 6. Clinically patient does not have depression. No treatment is required.      Reviewed with patient that test results are available in [facility] immediately, but that they will not necessarily be reviewed by me immediately.  Explained that I will review results at my earliest opportunity and contact patient appropriately.

## 2024-02-15 LAB
C TRACH DNA SPEC QL NAA+PROBE: NEGATIVE
N GONORRHOEA DNA SPEC QL NAA+PROBE: NEGATIVE

## 2024-03-27 ENCOUNTER — TELEPHONE (OUTPATIENT)
Dept: OBGYN CLINIC | Facility: CLINIC | Age: 34
End: 2024-03-27

## 2024-03-27 DIAGNOSIS — B37.31 VAGINAL CANDIDIASIS: Primary | ICD-10-CM

## 2024-03-27 RX ORDER — FLUCONAZOLE 150 MG/1
150 TABLET ORAL ONCE
Qty: 1 TABLET | Refills: 0 | Status: SHIPPED | OUTPATIENT
Start: 2024-03-27 | End: 2024-03-27

## 2024-03-27 NOTE — TELEPHONE ENCOUNTER
I will send Rx for vaginal yeast infection to her pharmacy.  If symptoms do not resolve within a week, she should make an appointment to be seen.

## 2024-05-21 ENCOUNTER — OFFICE VISIT (OUTPATIENT)
Dept: FAMILY MEDICINE CLINIC | Facility: CLINIC | Age: 34
End: 2024-05-21
Payer: COMMERCIAL

## 2024-05-21 ENCOUNTER — OFFICE VISIT (OUTPATIENT)
Dept: DENTISTRY | Facility: CLINIC | Age: 34
End: 2024-05-21

## 2024-05-21 ENCOUNTER — APPOINTMENT (OUTPATIENT)
Dept: LAB | Facility: CLINIC | Age: 34
End: 2024-05-21
Payer: COMMERCIAL

## 2024-05-21 VITALS
TEMPERATURE: 98.2 F | HEART RATE: 81 BPM | BODY MASS INDEX: 27.99 KG/M2 | HEIGHT: 65 IN | DIASTOLIC BLOOD PRESSURE: 80 MMHG | SYSTOLIC BLOOD PRESSURE: 120 MMHG | WEIGHT: 168 LBS

## 2024-05-21 VITALS — TEMPERATURE: 97 F | SYSTOLIC BLOOD PRESSURE: 131 MMHG | DIASTOLIC BLOOD PRESSURE: 92 MMHG | HEART RATE: 85 BPM

## 2024-05-21 DIAGNOSIS — Z01.20 ENCOUNTER FOR DENTAL EXAMINATION: Primary | ICD-10-CM

## 2024-05-21 DIAGNOSIS — T78.40XS ALLERGY, SEQUELA: ICD-10-CM

## 2024-05-21 DIAGNOSIS — Z00.00 ANNUAL PHYSICAL EXAM: Primary | ICD-10-CM

## 2024-05-21 DIAGNOSIS — Z00.00 ANNUAL PHYSICAL EXAM: ICD-10-CM

## 2024-05-21 LAB
ALBUMIN SERPL BCP-MCNC: 4.5 G/DL (ref 3.5–5)
ALP SERPL-CCNC: 80 U/L (ref 34–104)
ALT SERPL W P-5'-P-CCNC: 14 U/L (ref 7–52)
ANION GAP SERPL CALCULATED.3IONS-SCNC: 10 MMOL/L (ref 4–13)
AST SERPL W P-5'-P-CCNC: 17 U/L (ref 13–39)
BASOPHILS # BLD AUTO: 0.03 THOUSANDS/ÂΜL (ref 0–0.1)
BASOPHILS NFR BLD AUTO: 0 % (ref 0–1)
BILIRUB SERPL-MCNC: 1.35 MG/DL (ref 0.2–1)
BUN SERPL-MCNC: 10 MG/DL (ref 5–25)
CALCIUM SERPL-MCNC: 9.2 MG/DL (ref 8.4–10.2)
CHLORIDE SERPL-SCNC: 102 MMOL/L (ref 96–108)
CHOLEST SERPL-MCNC: 175 MG/DL
CO2 SERPL-SCNC: 26 MMOL/L (ref 21–32)
CREAT SERPL-MCNC: 0.68 MG/DL (ref 0.6–1.3)
EOSINOPHIL # BLD AUTO: 0.05 THOUSAND/ÂΜL (ref 0–0.61)
EOSINOPHIL NFR BLD AUTO: 1 % (ref 0–6)
ERYTHROCYTE [DISTWIDTH] IN BLOOD BY AUTOMATED COUNT: 11.8 % (ref 11.6–15.1)
GFR SERPL CREATININE-BSD FRML MDRD: 116 ML/MIN/1.73SQ M
GLUCOSE P FAST SERPL-MCNC: 87 MG/DL (ref 65–99)
HCT VFR BLD AUTO: 39.9 % (ref 34.8–46.1)
HDLC SERPL-MCNC: 54 MG/DL
HGB BLD-MCNC: 12.7 G/DL (ref 11.5–15.4)
IMM GRANULOCYTES # BLD AUTO: 0.01 THOUSAND/UL (ref 0–0.2)
IMM GRANULOCYTES NFR BLD AUTO: 0 % (ref 0–2)
LDLC SERPL CALC-MCNC: 104 MG/DL (ref 0–100)
LYMPHOCYTES # BLD AUTO: 2.69 THOUSANDS/ÂΜL (ref 0.6–4.47)
LYMPHOCYTES NFR BLD AUTO: 32 % (ref 14–44)
MCH RBC QN AUTO: 28.6 PG (ref 26.8–34.3)
MCHC RBC AUTO-ENTMCNC: 31.8 G/DL (ref 31.4–37.4)
MCV RBC AUTO: 90 FL (ref 82–98)
MONOCYTES # BLD AUTO: 0.48 THOUSAND/ÂΜL (ref 0.17–1.22)
MONOCYTES NFR BLD AUTO: 6 % (ref 4–12)
NEUTROPHILS # BLD AUTO: 5.05 THOUSANDS/ÂΜL (ref 1.85–7.62)
NEUTS SEG NFR BLD AUTO: 61 % (ref 43–75)
NONHDLC SERPL-MCNC: 121 MG/DL
NRBC BLD AUTO-RTO: 0 /100 WBCS
PLATELET # BLD AUTO: 361 THOUSANDS/UL (ref 149–390)
PMV BLD AUTO: 10.7 FL (ref 8.9–12.7)
POTASSIUM SERPL-SCNC: 4.2 MMOL/L (ref 3.5–5.3)
PROT SERPL-MCNC: 7.5 G/DL (ref 6.4–8.4)
RBC # BLD AUTO: 4.44 MILLION/UL (ref 3.81–5.12)
SODIUM SERPL-SCNC: 138 MMOL/L (ref 135–147)
TRIGL SERPL-MCNC: 84 MG/DL
TSH SERPL DL<=0.05 MIU/L-ACNC: 0.51 UIU/ML (ref 0.45–4.5)
WBC # BLD AUTO: 8.31 THOUSAND/UL (ref 4.31–10.16)

## 2024-05-21 PROCEDURE — 99214 OFFICE O/P EST MOD 30 MIN: CPT | Performed by: FAMILY MEDICINE

## 2024-05-21 PROCEDURE — 80053 COMPREHEN METABOLIC PANEL: CPT

## 2024-05-21 PROCEDURE — 85025 COMPLETE CBC W/AUTO DIFF WBC: CPT

## 2024-05-21 PROCEDURE — 87389 HIV-1 AG W/HIV-1&-2 AB AG IA: CPT

## 2024-05-21 PROCEDURE — 36415 COLL VENOUS BLD VENIPUNCTURE: CPT

## 2024-05-21 PROCEDURE — 80061 LIPID PANEL: CPT

## 2024-05-21 PROCEDURE — D0140 LIMITED ORAL EVALUATION - PROBLEM FOCUSED: HCPCS

## 2024-05-21 PROCEDURE — 99395 PREV VISIT EST AGE 18-39: CPT | Performed by: FAMILY MEDICINE

## 2024-05-21 PROCEDURE — 86803 HEPATITIS C AB TEST: CPT

## 2024-05-21 PROCEDURE — 84443 ASSAY THYROID STIM HORMONE: CPT

## 2024-05-21 RX ORDER — MONTELUKAST SODIUM 10 MG/1
10 TABLET ORAL
Qty: 90 TABLET | Refills: 3 | Status: SHIPPED | OUTPATIENT
Start: 2024-05-21

## 2024-05-21 RX ORDER — CETIRIZINE HYDROCHLORIDE 10 MG/1
10 TABLET ORAL DAILY
COMMUNITY

## 2024-05-21 NOTE — PATIENT INSTRUCTIONS
Wellness Visit for Adults   AMBULATORY CARE:   A wellness visit  is when you see your healthcare provider to get screened for health problems. Your healthcare provider will also give you advice on how to stay healthy. Write down your questions so you remember to ask them. Ask your healthcare provider how often you should have a wellness visit.  What happens at a wellness visit:  Your healthcare provider will ask about your health, and your family history of health problems. This includes high blood pressure, heart disease, and cancer. He or she will ask if you have symptoms that concern you, if you smoke, and about your mood. You may also be asked about your intake of medicines, supplements, food, and alcohol. Any of the following may be done:  Your weight  will be checked. Your height may also be checked so your body mass index (BMI) can be calculated. Your BMI shows if you are at a healthy weight.    Your blood pressure  and heart rate will be checked. Your temperature may also be checked.    Blood and urine tests  may be done. Blood tests may be done to check your cholesterol levels. Abnormal cholesterol levels increase your risk for heart disease and stroke. You may also need a blood or urine test to check for diabetes if you are at increased risk. Urine tests may be done to look for signs of an infection or kidney disease.    A physical exam  includes checking your heartbeat and lungs with a stethoscope. Your healthcare provider may also check your skin to look for sun damage.    Screening tests  may be recommended. A screening test is done to check for diseases that may not cause symptoms. The screening tests you may need depend on your age, gender, family history, and lifestyle habits. For example, colorectal screening may be recommended if you are 50 years old or older.    Screening tests you need if you are a woman:   A Pap smear  is used to screen for cervical cancer. Pap smears are usually done every 3 to  5 years depending on your age. You may need them more often if you have had abnormal Pap smear test results in the past. Ask your healthcare provider how often you should have a Pap smear.    A mammogram  is an x-ray of your breasts to screen for breast cancer. Experts recommend mammograms every 2 years starting at age 50 years. You may need a mammogram at age 49 years or younger if you have an increased risk for breast cancer. Talk to your healthcare provider about when you should start having mammograms and how often you need them.    Vaccines you may need:   Get an influenza vaccine  every year. The influenza vaccine protects you from the flu. Several types of viruses cause the flu. The viruses change over time, so new vaccines are made each year.    Get a tetanus-diphtheria (Td) booster vaccine  every 10 years. This vaccine protects you against tetanus and diphtheria. Tetanus is a severe infection that may cause painful muscle spasms and lockjaw. Diphtheria is a severe bacterial infection that causes a thick covering in the back of your mouth and throat.    Get a human papillomavirus (HPV) vaccine  if you are female and aged 19 to 26 or male 19 to 21 and never received it. This vaccine protects you from HPV infection. HPV is the most common infection spread by sexual contact. HPV may also cause vaginal, penile, and anal cancers.    Get a pneumococcal vaccine  if you are aged 65 years or older. The pneumococcal vaccine is an injection given to protect you from pneumococcal disease. Pneumococcal disease is an infection caused by pneumococcal bacteria. The infection may cause pneumonia, meningitis, or an ear infection.    Get a shingles vaccine  if you are 60 or older, even if you have had shingles before. The shingles vaccine is an injection to protect you from the varicella-zoster virus. This is the same virus that causes chickenpox. Shingles is a painful rash that develops in people who had chickenpox or have  been exposed to the virus.    How to eat healthy:  My Plate is a model for planning healthy meals. It shows the types and amounts of foods that should go on your plate. Fruits and vegetables make up about half of your plate, and grains and protein make up the other half. A serving of dairy is included on the side of your plate. The amount of calories and serving sizes you need depends on your age, gender, weight, and height. Examples of healthy foods are listed below:  Eat a variety of vegetables  such as dark green, red, and orange vegetables. You can also include canned vegetables low in sodium (salt) and frozen vegetables without added butter or sauces.    Eat a variety of fresh fruits , canned fruit in 100% juice, frozen fruit, and dried fruit.    Include whole grains.  At least half of the grains you eat should be whole grains. Examples include whole-wheat bread, wheat pasta, brown rice, and whole-grain cereals such as oatmeal.    Eat a variety of protein foods such as seafood (fish and shellfish), lean meat, and poultry without skin (turkey and chicken). Examples of lean meats include pork leg, shoulder, or tenderloin, and beef round, sirloin, tenderloin, and extra lean ground beef. Other protein foods include eggs and egg substitutes, beans, peas, soy products, nuts, and seeds.    Choose low-fat dairy products such as skim or 1% milk or low-fat yogurt, cheese, and cottage cheese.    Limit unhealthy fats  such as butter, hard margarine, and shortening.       Exercise:  Exercise at least 30 minutes per day on most days of the week. Some examples of exercise include walking, biking, dancing, and swimming. You can also fit in more physical activity by taking the stairs instead of the elevator or parking farther away from stores. Include muscle strengthening activities 2 days each week. Regular exercise provides many health benefits. It helps you manage your weight, and decreases your risk for type 2 diabetes,  heart disease, stroke, and high blood pressure. Exercise can also help improve your mood. Ask your healthcare provider about the best exercise plan for you.       General health and safety guidelines:   Do not smoke.  Nicotine and other chemicals in cigarettes and cigars can cause lung damage. Ask your healthcare provider for information if you currently smoke and need help to quit. E-cigarettes or smokeless tobacco still contain nicotine. Talk to your healthcare provider before you use these products.    Limit alcohol.  A drink of alcohol is 12 ounces of beer, 5 ounces of wine, or 1½ ounces of liquor.    Lose weight, if needed.  Being overweight increases your risk of certain health conditions. These include heart disease, high blood pressure, type 2 diabetes, and certain types of cancer.    Protect your skin.  Do not sunbathe or use tanning beds. Use sunscreen with a SPF 15 or higher. Apply sunscreen at least 15 minutes before you go outside. Reapply sunscreen every 2 hours. Wear protective clothing, hats, and sunglasses when you are outside.    Drive safely.  Always wear your seatbelt. Make sure everyone in your car wears a seatbelt. A seatbelt can save your life if you are in an accident. Do not use your cell phone when you are driving. This could distract you and cause an accident. Pull over if you need to make a call or send a text message.    Practice safe sex.  Use latex condoms if are sexually active and have more than one partner. Your healthcare provider may recommend screening tests for sexually transmitted infections (STIs).    Wear helmets, lifejackets, and protective gear.  Always wear a helmet when you ride a bike or motorcycle, go skiing, or play sports that could cause a head injury. Wear protective equipment when you play sports. Wear a lifejacket when you are on a boat or doing water sports.    © Copyright [employer] 2023 Information is for End User's use only and may not be sold, redistributed or  otherwise used for commercial purposes.  The above information is an  only. It is not intended as medical advice for individual conditions or treatments. Talk to your doctor, nurse or pharmacist before following any medical regimen to see if it is safe and effective for you.    Weight Management   AMBULATORY CARE:   Why it is important to manage your weight:  Being overweight increases your risk of health conditions such as heart disease, high blood pressure, type 2 diabetes, and certain types of cancer. It can also increase your risk for osteoarthritis, sleep apnea, and other respiratory problems. Aim for a slow, steady weight loss. Even a small amount of weight loss can lower your risk of health problems.  Risks of being overweight:  Extra weight can cause many health problems, including the following:  Diabetes (high blood sugar level)    High blood pressure or high cholesterol    Heart disease    Stroke    Gallbladder or liver disease    Cancer of the colon, breast, prostate, liver, or kidney    Sleep apnea    Arthritis or gout    Screening  is done to check for health conditions before you have signs or symptoms. If you are 35 to 70 years old, your blood sugar level may be checked every 3 years for signs of prediabetes or diabetes. Your healthcare provider will check your blood pressure at each visit. High blood pressure can lead to a stroke or other problems. Your provider may check for signs of heart disease, cancer, or other health problems.  How to lose weight safely:  A safe and healthy way to lose weight is to eat fewer calories and get regular exercise.  You can lose up about 1 pound a week by decreasing the number of calories you eat by 500 calories each day. You can decrease calories by eating smaller portion sizes or by cutting out high-calorie foods. Read labels to find out how many calories are in the foods you eat.         You can also burn calories with exercise such as walking,  swimming, or biking. You will be more likely to keep weight off if you make these changes part of your lifestyle. Exercise at least 30 minutes per day on most days of the week. You can also fit in more physical activity by taking the stairs instead of the elevator or parking farther away from stores. Ask your healthcare provider about the best exercise plan for you.       Healthy meal plan for weight management:  A healthy meal plan includes a variety of foods, contains fewer calories, and helps you stay healthy. A healthy meal plan includes the following:     Eat whole-grain foods more often.  A healthy meal plan should contain fiber. Fiber is the part of grains, fruits, and vegetables that is not broken down by your body. Whole-grain foods are healthy and provide extra fiber in your diet. Some examples of whole-grain foods are whole-wheat breads and pastas, oatmeal, brown rice, and bulgur.    Eat a variety of vegetables every day.  Include dark, leafy greens such as spinach, kale, jimmy greens, and mustard greens. Eat yellow and orange vegetables such as carrots, sweet potatoes, and winter squash.     Eat a variety of fruits every day.  Choose fresh or canned fruit (canned in its own juice or light syrup) instead of juice. Fruit juice has very little or no fiber.    Eat low-fat dairy foods.  Drink fat-free (skim) milk or 1% milk. Eat fat-free yogurt and low-fat cottage cheese. Try low-fat cheeses such as mozzarella and other reduced-fat cheeses.    Choose meat and other protein foods that are low in fat.  Choose beans or other legumes such as split peas or lentils. Choose fish, skinless poultry (chicken or turkey), or lean cuts of red meat (beef or pork). Before you cook meat or poultry, cut off any visible fat.     Use less fat and oil.  Try baking foods instead of frying them. Add less fat, such as margarine, sour cream, regular salad dressing and mayonnaise to foods. Eat fewer high-fat foods. Some examples of  high-fat foods include french fries, doughnuts, ice cream, and cakes.    Eat fewer sweets.  Limit foods and drinks that are high in sugar. This includes candy, cookies, regular soda, and sweetened drinks.  Ways to decrease calories:   Eat smaller portions.     Use a small plate with smaller servings.    Do not eat second helpings.    When you eat at a restaurant, ask for a box and place half of your meal in the box before you eat.    Share an entrée with someone else.    Replace high-calorie snacks with healthy, low-calorie snacks.     Choose fresh fruit, vegetables, fat-free rice cakes, or air-popped popcorn instead of potato chips, nuts, or chocolate.    Choose water or calorie-free drinks instead of soda or sweetened drinks.    Do not shop for groceries when you are hungry.  You may be more likely to make unhealthy food choices. Take a grocery list of healthy foods and shop after you have eaten.    Eat regular meals. Do not skip meals. Skipping meals can lead to overeating later in the day. This can make it harder for you to lose weight. Eat a healthy snack in place of a meal if you do not have time to eat a regular meal. Talk with a dietitian to help you create a meal plan and schedule that is right for you.    Other things to consider as you try to lose weight:   Be aware of situations that may give you the urge to overeat, such as eating while watching television. Find ways to avoid these situations. For example, read a book, go for a walk, or do crafts.    Meet with a weight loss support group or friends who are also trying to lose weight. This may help you stay motivated to continue working on your weight loss goals.    © Copyright [employer] 2023 Information is for End User's use only and may not be sold, redistributed or otherwise used for commercial purposes.  The above information is an  only. It is not intended as medical advice for individual conditions or treatments. Talk to your doctor,  nurse or pharmacist before following any medical regimen to see if it is safe and effective for you.    Cholesterol and Your Health   AMBULATORY CARE:   Cholesterol  is a waxy, fat-like substance. Your body uses cholesterol to make hormones and new cells, and to protect nerves. Cholesterol is made by your body. It also comes from certain foods you eat, such as meat and dairy products. Your healthcare provider can help you set goals for your cholesterol levels. Your provider can help you create a plan to meet your goals.  Cholesterol level goals:  Your cholesterol level goals depend on your risk for heart disease, your age, and your other health conditions. The following are general guidelines:  Total cholesterol  includes low-density lipoprotein (LDL), high-density lipoprotein (HDL), and triglyceride levels. The total cholesterol level should be lower than 200 mg/dL and is best at about 150 mg/dL.    LDL cholesterol  is called bad cholesterol  because it forms plaque in your arteries. As plaque builds up, your arteries become narrow, and less blood flows through. When plaque decreases blood flow to your heart, you may have chest pain. If plaque completely blocks an artery that brings blood to your heart, you may have a heart attack. Plaque can break off and form blood clots. Blood clots may block arteries in your brain and cause a stroke. The level should be less than 130 mg/dL and is best at about 100 mg/dL.         HDL cholesterol  is called good cholesterol  because it helps remove LDL cholesterol from your arteries. It does this by attaching to LDL cholesterol and carrying it to your liver. Your liver breaks down LDL cholesterol so your body can get rid of it. High levels of HDL cholesterol can help prevent a heart attack and stroke. Low levels of HDL cholesterol can increase your risk for heart disease, heart attack, and stroke. The level should be at least 40 mg/dL in males or at least 50 mg/dL in  females.    Triglycerides  are a type of fat that store energy from foods you eat. High levels of triglycerides also cause plaque buildup. This can increase your risk for a heart attack or stroke. If your triglyceride level is high, your LDL cholesterol level may also be high. The level should be less than 150 mg/dL.    Any of the following can increase your risk for high cholesterol:   Smoking or drinking large amounts of alcohol    Having overweight or obesity, or not getting enough exercise    A medical condition such as hypertension (high blood pressure) or diabetes    A family history of high cholesterol    Age older than 65    What you need to know about having your cholesterol levels checked:  Adults 20 to 45 years of age should have their cholesterol levels checked every 4 to 6 years. Adults 45 years or older should have their cholesterol checked every 1 to 2 years. You may need your cholesterol checked more often, or at a younger age, if you have risk factors for heart disease. You may also need to have your cholesterol checked more often if you have other health conditions, such as diabetes. Blood tests are used to check cholesterol levels. Blood tests measure your levels of triglycerides, LDL cholesterol, and HDL cholesterol.  How healthy fats affect your cholesterol levels:  Healthy fats, also called unsaturated fats, help lower LDL cholesterol and triglyceride levels. Healthy fats include the following:  Monounsaturated fats  are found in foods such as olive oil, canola oil, avocado, nuts, and olives.    Polyunsaturated fats,  such as omega 3 fats, are found in fish, such as salmon, trout, and tuna. They can also be found in plant foods such as flaxseed, walnuts, and soybeans.    How unhealthy fats affect your cholesterol levels:  Unhealthy fats increase LDL cholesterol and triglyceride levels. They are found in foods high in cholesterol, saturated fat, and trans fat:  Cholesterol  is found in eggs,  dairy, and meat.    Saturated fat  is found in butter, cheese, ice cream, whole milk, and coconut oil. Saturated fat is also found in meat, such as sausage, hot dogs, and bologna.    Trans fat  is found in liquid oils and is used in fried and baked foods. Foods that contain trans fats include chips, crackers, muffins, sweet rolls, microwave popcorn, and cookies.    Treatment  for high cholesterol will also decrease your risk of heart disease, heart attack, and stroke. Treatment may include any of the following:  Lifestyle changes  may include food, exercise, weight loss, and quitting smoking. You may also need to decrease the amount of alcohol you drink. Your healthcare provider will want you to start with lifestyle changes. Other treatment may be added if lifestyle changes are not enough. Your healthcare provider may recommend you work with a team to manage hyperlipidemia. The team may include medical experts such as a dietitian, an exercise or physical therapist, and a behavior therapist. Your family members may be included in helping you create lifestyle changes.    Medicines  may be given to lower your LDL cholesterol, triglyceride levels, or total cholesterol level. You may need medicines to lower your cholesterol if any of the following is true:    You have a history of stroke, TIA, unstable angina, or a heart attack.    Your LDL cholesterol level is 190 mg/dL or higher.    You are age 40 to 75 years, have diabetes or heart disease risk factors, and your LDL cholesterol is 70 mg/dL or higher.    Supplements  include fish oil, red yeast rice, and garlic. Fish oil may help lower your triglyceride and LDL cholesterol levels. It may also increase your HDL cholesterol level. Red yeast rice may help decrease your total cholesterol level and LDL cholesterol level. Garlic may help lower your total cholesterol level. Do not take any supplements without talking to your healthcare provider.    Food changes you can make  to lower your cholesterol levels:  A dietitian can help you create a healthy eating plan. Your dietitian can show you how to read food labels and choose foods low in saturated fat, trans fats, and cholesterol.     Decrease the total amount of fat you eat.  Choose lean meats, fat-free or 1% fat milk, and low-fat dairy products, such as yogurt and cheese. Try to limit or avoid red meats. Limit or do not eat fried foods or baked goods, such as cookies.    Replace unhealthy fats with healthy fats.  Cook foods in olive oil or canola oil. Choose soft margarines that are low in saturated fat and trans fat. Seeds, nuts, and avocados are other examples of healthy fats.    Eat foods with omega-3 fats.  Examples include salmon, tuna, mackerel, walnuts, and flaxseed. Eat fish 2 times per week. Pregnant women should not eat fish that have high levels of mercury, such as shark, swordfish, and sherri mackerel.         Increase the amount of high-fiber foods you eat.  High-fiber foods can help lower your LDL cholesterol. Aim to get between 20 and 30 grams of fiber each day. Fruits and vegetables are high in fiber. Eat at least 5 servings each day. Other high-fiber foods are whole-grain or whole-wheat breads, pastas, or cereals, and brown rice. Eat 3 ounces of whole-grain foods each day. Increase fiber slowly. You may have abdominal discomfort, bloating, and gas if you add fiber to your diet too quickly.         Eat healthy protein foods.  Examples include low-fat dairy products, skinless chicken and turkey, fish, and nuts.    Limit foods and drinks that are high in sugar.  Your dietitian or healthcare provider can help you create daily limits for high-sugar foods and drinks. The limit may be lower if you have diabetes or another health condition. Limits can also help you lose weight if needed.  Lifestyle changes you can make to lower your cholesterol levels:   Maintain a healthy weight.  Ask your healthcare provider what a healthy  weight is for you. Ask your provider to help you create a weight loss plan if needed. Weight loss can decrease your total cholesterol and triglyceride levels. Weight loss may also help keep your blood pressure at a healthy level.    Be physically active throughout the day.  Physical activity, such as exercise, can help lower your total cholesterol level and maintain a healthy weight. Physical activity can also help increase your HDL cholesterol level. Work with your healthcare provider to create an program that is right for you. Get at least 30 to 40 minutes of moderate physical activity most days of the week. Examples of exercise include brisk walking, swimming, or biking. Also include strength training at least 2 times each week. Your healthcare providers can help you create a physical activity plan.            Do not smoke.  Nicotine and other chemicals in cigarettes and cigars can raise your cholesterol levels. Ask your healthcare provider for information if you currently smoke and need help to quit. E-cigarettes or smokeless tobacco still contain nicotine. Talk to your healthcare provider before you use these products.         Limit or do not drink alcohol.  Alcohol can increase your triglyceride levels. Ask your healthcare provider before you drink alcohol. Ask how much is okay for you to drink in 24 hours or 1 week.    Follow up with your doctor as directed:  Write down your questions so you remember to ask them during your visits.  © Copyright [employer] 2023 Information is for End User's use only and may not be sold, redistributed or otherwise used for commercial purposes.  The above information is an  only. It is not intended as medical advice for individual conditions or treatments. Talk to your doctor, nurse or pharmacist before following any medical regimen to see if it is safe and effective for you.

## 2024-05-21 NOTE — PROGRESS NOTES
Adult Annual Physical  Name: Shelby Oliveira      : 1990      MRN: [id number]  Encounter Provider: Epifanio Bowles DO  Encounter Date: 2024   Encounter department: [facility]    Assessment & Plan   1. Annual physical exam  -     Lipid panel; Future; Expected date: 2024  -     CBC and differential; Future; Expected date: 2024  -     Comprehensive metabolic panel; Future; Expected date: 2024  -     TSH, 3rd generation with Free T4 reflex; Future; Expected date: 2024  -     Chlamydia/GC amplified DNA by PCR; Future  -     HIV 1/2 AG/AB w Reflex [facility] for 2 yr old and above; Future  -     Hepatitis C antibody; Future  2. Allergy, sequela  -     montelukast (SINGULAIR) 10 mg tablet; Take 1 tablet (10 mg total) by mouth daily at bedtime  3. BMI 27.0-27.9,adult    Immunizations and preventive care screenings were discussed with patient today. Appropriate education was printed on patient's after visit summary.    Counseling:  Alcohol/drug use: discussed moderation in alcohol intake, the recommendations for healthy alcohol use, and avoidance of illicit drug use.  Dental Health: discussed importance of regular tooth brushing, flossing, and dental visits.  Injury prevention: discussed safety/seat belts, safety helmets, smoke detectors, carbon dioxide detectors, and smoking near bedding or upholstery.  Sexual health: discussed sexually transmitted diseases, partner selection, use of condoms, avoidance of unintended pregnancy, and contraceptive alternatives.  Exercise: the importance of regular exercise/physical activity was discussed. Recommend exercise 3-5 times per week for at least 30 minutes.       Depression Screening and Follow-up Plan: Patient was screened for depression during today's encounter. They screened negative with a PHQ-2 score of 1.        History of Present Illness     Adult Annual Physical:  Patient presents for annual physical.     Diet  "and Physical Activity:  - Diet/Nutrition: well balanced diet.  - Exercise: moderate cardiovascular exercise.    Depression Screening:  - PHQ-2 Score: 1    General Health:  - Sleep: sleeps poorly.  - Hearing: normal hearing right ear.  - Vision: no vision problems.    Review of Systems   Constitutional: Negative.    HENT: Negative.     Eyes: Negative.    Respiratory: Negative.     Cardiovascular: Negative.    Gastrointestinal: Negative.    Endocrine: Negative.    Genitourinary: Negative.    Musculoskeletal: Negative.    Skin: Negative.    Allergic/Immunologic: Negative.    Neurological: Negative.    Hematological: Negative.    Psychiatric/Behavioral: Negative.     All other systems reviewed and are negative.    Pertinent Medical History   Chief Complaint   Patient presents with   • Physical Exam     Please check mole on R shoulder area, no changes in shape or size, also with allergies        Past Medical History:   Diagnosis Date   • Anxiety     no medications   • Chlamydia     early 20's   • Urogenital trichomoniasis 2019           Objective     /80 (BP Location: Left arm, Patient Position: Sitting, Cuff Size: Standard)   Pulse 81   Temp 98.2 °F (36.8 °C)   Ht 5' 5\" (1.651 m)   Wt 76.2 kg (168 lb)   BMI 27.96 kg/m²     Physical Exam  Vitals and nursing note reviewed.   Constitutional:       Appearance: She is well-developed.   HENT:      Head: Normocephalic.   Eyes:      Pupils: Pupils are equal, round, and reactive to light.   Cardiovascular:      Rate and Rhythm: Normal rate and regular rhythm.      Heart sounds: Normal heart sounds.   Pulmonary:      Effort: Pulmonary effort is normal.      Breath sounds: Normal breath sounds.   Abdominal:      General: Bowel sounds are normal.      Palpations: Abdomen is soft.   Musculoskeletal:      Cervical back: Normal range of motion and neck supple.   Skin:     General: Skin is warm and dry.      Capillary Refill: Capillary refill takes less than 2 seconds. "   Neurological:      General: No focal deficit present.      Mental Status: She is alert and oriented to person, place, and time.   Psychiatric:         Mood and Affect: Mood normal.         Behavior: Behavior normal.       Administrative Statements

## 2024-05-22 LAB
C TRACH DNA SPEC QL NAA+PROBE: NEGATIVE
HCV AB SER QL: NORMAL
HIV 1+2 AB+HIV1 P24 AG SERPL QL IA: NORMAL
HIV 2 AB SERPL QL IA: NORMAL
HIV1 AB SERPL QL IA: NORMAL
HIV1 P24 AG SERPL QL IA: NORMAL
N GONORRHOEA DNA SPEC QL NAA+PROBE: NEGATIVE

## 2024-05-23 ENCOUNTER — OFFICE VISIT (OUTPATIENT)
Dept: OBGYN CLINIC | Facility: CLINIC | Age: 34
End: 2024-05-23

## 2024-05-23 VITALS
BODY MASS INDEX: 28.86 KG/M2 | DIASTOLIC BLOOD PRESSURE: 81 MMHG | SYSTOLIC BLOOD PRESSURE: 123 MMHG | HEART RATE: 72 BPM | WEIGHT: 173.2 LBS | HEIGHT: 65 IN

## 2024-05-23 DIAGNOSIS — Z30.011 ENCOUNTER FOR INITIAL PRESCRIPTION OF CONTRACEPTIVE PILLS: ICD-10-CM

## 2024-05-23 PROCEDURE — 99213 OFFICE O/P EST LOW 20 MIN: CPT | Performed by: OBSTETRICS & GYNECOLOGY

## 2024-05-23 RX ORDER — NORGESTIMATE AND ETHINYL ESTRADIOL 0.25-0.035
1 KIT ORAL DAILY
Qty: 28 TABLET | Refills: 11 | Status: SHIPPED | OUTPATIENT
Start: 2024-05-23

## 2024-05-23 NOTE — PROGRESS NOTES
"PROBLEM GYNECOLOGICAL VISIT    Shelby Oliveira is a 34 y.o. female who presents today for contraceptive management.  Her general medical history has been reviewed and she reports it as follows:    Past Medical History:   Diagnosis Date    Anxiety     no medications    Chlamydia     early     Urogenital trichomoniasis      Past Surgical History:   Procedure Laterality Date    INDUCED       Surgically induced. Description: 2012      OB History          2    Para   1    Term   1       0    AB   1    Living   1         SAB   0    IAB   1    Ectopic   0    Multiple   0    Live Births   1               Social History     Tobacco Use    Smoking status: Never    Smokeless tobacco: Never    Tobacco comments:     Hookah socially   Vaping Use    Vaping status: Every Day    Substances: Nicotine, Flavoring   Substance Use Topics    Alcohol use: Yes     Comment: occasionally but stopped when found out pregnant    Drug use: Never     Social History     Substance and Sexual Activity   Sexual Activity Yes    Partners: Male       Current Outpatient Medications   Medication Instructions    cetirizine (ZYRTEC) 10 mg, Oral, Daily    montelukast (SINGULAIR) 10 mg, Oral, Daily at bedtime    norgestimate-ethinyl estradiol (ORTHO-CYCLEN) 0.25-35 MG-MCG per tablet 1 tablet, Oral, Daily    Prenatal Vit-Fe Fumarate-FA (M-Camryn Plus) 27-1 MG TABS TAKE 1 TABLET BY MOUTH EVERY DAY IN THE MORNING       History of Present Illness:   Patient presents for contraceptive management requesting to restart OCP.    Review of Systems:  Review of Systems   Genitourinary:  Negative for pelvic pain, vaginal bleeding and vaginal discharge.        Menses   All other systems reviewed and are negative.      Physical Exam:  /81 (BP Location: Left arm, Patient Position: Sitting, Cuff Size: Standard)   Pulse 72   Ht 5' 5\" (1.651 m)   Wt 78.6 kg (173 lb 3.2 oz)   LMP 2024   BMI 28.82 kg/m²   Physical " Exam  Constitutional:       Appearance: Normal appearance.   Neurological:      Mental Status: She is alert.   Vitals reviewed.         Assessment:   1. Contraceptive management    Plan:   1.  Sprinctec escribe   2. Return to office keep scheduled annual visit.       Reviewed with patient that test results are available in MyChart immediately, but that they will not necessarily be reviewed by me immediately.  Explained that I will review results at my earliest opportunity and contact patient appropriately.

## 2024-05-29 NOTE — PROGRESS NOTES
"Dental procedures in this visit     - LIMITED ORAL EVALUATION - PROBLEM FOCUSED (Completed)     Service provider: Mesfin Tao DMD     Billing provider: Mesfin Tao DMD     Subjective   Patient ID: Shelby Oliveira is a 34 y.o. female.  No chief complaint on file.    HPI  The following portions of the chart were reviewed this encounter and updated as appropriate:    Tobacco  Allergies  Meds  Problems  Med Hx  Surg Hx  Fam Hx         \"The temporary crown on my tooth on the bottom left came off a while ago. I'm concern by how the tooth looks. It doesn't hurt but I'm wondering if it needs to be pulled.\"    Objective   Soft Tissue Exam  No findings documented this visit      Dental Exam    Hard Tissue Exam:  #19 abutment appears clinically caries free     Assessment & Plan   Problem List Items Addressed This Visit    None  Visit Diagnoses       Encounter for dental examination    -  Primary    Relevant Orders    LIMITED ORAL EVALUATION - PROBLEM FOCUSED (Completed)        33 yo female presents with #19 crown abutment tooth that is missing the temporary crown. The patient was able to bring in the temporary crown but It had been off the tooth for several months. I reassured the patient that #19 was still healthy and should be crowned ASAP. I attempted to recemented the old provisional, but it required significant adjustment to seat and was cemented in slight hyperocclusion. As such, I told the patient to give us a call with the tooth should hurt or the temporary crown breaks. Once preauth / SFS on crown complete, I recommend the tooth be reprepped and a new final impression be taken to fabricate a new crown (the old one should be sent back to the lab).    NV: #28 DO, 29 MOD; once preauth / SFS, #19 crown prep / provisional  "

## 2024-06-06 ENCOUNTER — OFFICE VISIT (OUTPATIENT)
Dept: DENTISTRY | Facility: CLINIC | Age: 34
End: 2024-06-06

## 2024-06-06 VITALS — SYSTOLIC BLOOD PRESSURE: 122 MMHG | TEMPERATURE: 97 F | HEART RATE: 78 BPM | DIASTOLIC BLOOD PRESSURE: 83 MMHG

## 2024-06-06 DIAGNOSIS — Z01.20 ENCOUNTER FOR DENTAL EXAMINATION: Primary | ICD-10-CM

## 2024-06-06 PROCEDURE — D1330 ORAL HYGIENE INSTRUCTIONS: HCPCS | Performed by: DENTAL HYGIENIST

## 2024-06-06 PROCEDURE — D0120 PERIODIC ORAL EVALUATION - ESTABLISHED PATIENT: HCPCS

## 2024-06-06 PROCEDURE — D0274 BITEWINGS - 4 RADIOGRAPHIC IMAGES: HCPCS | Performed by: DENTAL HYGIENIST

## 2024-06-06 PROCEDURE — D1110 PROPHYLAXIS - ADULT: HCPCS | Performed by: DENTAL HYGIENIST

## 2024-06-06 NOTE — DENTAL PROCEDURE DETAILS
Shelby Oliveira presents for a Periodic exam. Verbal consent for treatment given in addition to the forms.     Reviewed health history - Patient is ASA I  Consents signed: Yes     Perio: Slight bleeding and Gingivitis  Pain Scale: 0  Caries Assessment: Medium  Radiographs: Bitewings x4  EO/IO/OCS:  WNL;  Bilateral mandibular brian     Oral Hygiene instruction reviewed and given.  OHI:  Fair  ---Lt calc and plaque  ---Cavitron, handscaled, polish, floss  Recommended Hygiene recall visits with Shelby.     Treatment Plan:  1.  6mrc   2.  Caries control: Decay areas as charted  ---#19 - crown is here, pt resigned up for SFS - needs price quote  3.  Occlusal evaluation:   Missing teeth    Prognosis is Good.  Referrals needed: No  Exam:  Dr. ESPERANZA Jackson    NV1:  Rest or del crown #19 if it fits and pt can pay - 60 min  NV2:  6mrc - 50 min

## 2024-06-13 ENCOUNTER — OFFICE VISIT (OUTPATIENT)
Dept: DENTISTRY | Facility: CLINIC | Age: 34
End: 2024-06-13

## 2024-06-13 ENCOUNTER — TELEPHONE (OUTPATIENT)
Dept: FAMILY MEDICINE CLINIC | Facility: CLINIC | Age: 34
End: 2024-06-13

## 2024-06-13 ENCOUNTER — DOCUMENTATION (OUTPATIENT)
Dept: FAMILY MEDICINE CLINIC | Facility: CLINIC | Age: 34
End: 2024-06-13

## 2024-06-13 VITALS — TEMPERATURE: 97.3 F | DIASTOLIC BLOOD PRESSURE: 84 MMHG | SYSTOLIC BLOOD PRESSURE: 127 MMHG | HEART RATE: 84 BPM

## 2024-06-13 DIAGNOSIS — K02.9 DENTAL CARIES: Primary | ICD-10-CM

## 2024-06-13 PROCEDURE — D2392 RESIN-BASED COMPOSITE - 2 SURFACES, POSTERIOR: HCPCS

## 2024-06-13 NOTE — DENTAL PROCEDURE DETAILS
Composite Filling    Shelbylucina Oliveira presents for composite filling. PMH reviewed, no changes.    Discussed with patient need for RCT if pulp exposure occurs or in future if pulp is inflamed. Pt understands and consents.    Applied topical benzocaine, administered 1 carp 2% lido 1:100k epi via IANB    Prepped tooth #28 DO, 29 MO with 8835 wade on high speed. Caries removed with round carbide on slow speed. Placed almeida matrix. Isolation with cotton rolls    Etch with 37% H2PO4, rinse, dry. Applied Adhese with 20 second scrub once, gentle air dry and light cured for 10s. Restored with Tetric bulk sherif shade A2 and light cured.    Refined with finishing burs, polished with enhance point. Verified occlusion and contacts. Pt left satisfied.    NV: #19 crown

## 2024-06-13 NOTE — PROGRESS NOTES
FMLA Intermittence Form was completed, scanned into patients' chart and faxed today 06/12/24.        The above form was filled out and awaiting review, signature ad date from Dr. Bowles.

## 2024-06-14 ENCOUNTER — OFFICE VISIT (OUTPATIENT)
Dept: OBGYN CLINIC | Facility: CLINIC | Age: 34
End: 2024-06-14

## 2024-06-14 VITALS
SYSTOLIC BLOOD PRESSURE: 142 MMHG | HEIGHT: 65 IN | BODY MASS INDEX: 28.52 KG/M2 | DIASTOLIC BLOOD PRESSURE: 91 MMHG | HEART RATE: 80 BPM | WEIGHT: 171.2 LBS

## 2024-06-14 DIAGNOSIS — Z11.3 SCREEN FOR STD (SEXUALLY TRANSMITTED DISEASE): Primary | ICD-10-CM

## 2024-06-14 PROCEDURE — 99213 OFFICE O/P EST LOW 20 MIN: CPT | Performed by: OBSTETRICS & GYNECOLOGY

## 2024-06-14 PROCEDURE — 87591 N.GONORRHOEAE DNA AMP PROB: CPT | Performed by: OBSTETRICS & GYNECOLOGY

## 2024-06-14 PROCEDURE — 87491 CHLMYD TRACH DNA AMP PROBE: CPT | Performed by: OBSTETRICS & GYNECOLOGY

## 2024-06-14 NOTE — PROGRESS NOTES
"PROBLEM GYNECOLOGICAL VISIT    Shelby Oliveira is a 34 y.o. female who presents today with complaint of condom broke during intercourse.  Her general medical history has been reviewed and she reports it as follows:    Past Medical History:   Diagnosis Date    Anxiety     no medications    Chlamydia     early     Urogenital trichomoniasis 2019     Past Surgical History:   Procedure Laterality Date    INDUCED       Surgically induced. Description: 2012      OB History          2    Para   1    Term   1       0    AB   1    Living   1         SAB   0    IAB   1    Ectopic   0    Multiple   0    Live Births   1               Social History     Tobacco Use    Smoking status: Never    Smokeless tobacco: Never    Tobacco comments:     Hookah socially   Vaping Use    Vaping status: Every Day    Substances: Nicotine, Flavoring   Substance Use Topics    Alcohol use: Yes     Comment: occasionally but stopped when found out pregnant    Drug use: Never     Social History     Substance and Sexual Activity   Sexual Activity Yes    Partners: Male       Current Outpatient Medications   Medication Instructions    cetirizine (ZYRTEC) 10 mg, Oral, Daily    montelukast (SINGULAIR) 10 mg, Oral, Daily at bedtime    norgestimate-ethinyl estradiol (ORTHO-CYCLEN) 0.25-35 MG-MCG per tablet 1 tablet, Oral, Daily    Prenatal Vit-Fe Fumarate-FA (M-Camryn Plus) 27-1 MG TABS TAKE 1 TABLET BY MOUTH EVERY DAY IN THE MORNING       History of Present Illness:   Patient presents with c/o that condom broke on   and requesting STI and pregnancy test.  Patient states has started spotting today most likely her menses.    Review of Systems:  Review of Systems   Genitourinary:         Broken condom and menses   All other systems reviewed and are negative.      Physical Exam:  /91 (BP Location: Left arm, Patient Position: Sitting, Cuff Size: Standard)   Pulse 80   Ht 5' 5\" (1.651 m)   Wt 77.7 kg (171 lb 3.2 oz)   " LMP 05/21/2024 (Exact Date)   BMI 28.49 kg/m²   Physical Exam  Constitutional:       Appearance: Normal appearance.   Neurological:      Mental Status: She is alert.   Vitals reviewed.         Assessment:   1. Broken Condom   2. STI testing    Plan:   1. Cultures ordered: GC/chlamydia   2. Urine pregnancy: negative   3. Return to office prn.     Reviewed with patient that [facility] immediately, but that they will not necessarily be reviewed by me immediately.  Explained that I will review results at my earliest opportunity and contact patient appropriately.

## 2024-06-15 LAB
C TRACH DNA SPEC QL NAA+PROBE: NEGATIVE
N GONORRHOEA DNA SPEC QL NAA+PROBE: NEGATIVE

## 2024-06-19 ENCOUNTER — TELEPHONE (OUTPATIENT)
Dept: OBGYN CLINIC | Facility: CLINIC | Age: 34
End: 2024-06-19

## 2024-06-19 NOTE — TELEPHONE ENCOUNTER
Patient called to see if she can get a refill for flagyl. Patient on menses and a tampon has caused her irritation. Patient would like to speak to nurse.

## 2024-06-20 ENCOUNTER — OFFICE VISIT (OUTPATIENT)
Dept: OBGYN CLINIC | Facility: CLINIC | Age: 34
End: 2024-06-20

## 2024-06-20 VITALS
SYSTOLIC BLOOD PRESSURE: 135 MMHG | DIASTOLIC BLOOD PRESSURE: 83 MMHG | BODY MASS INDEX: 28.16 KG/M2 | WEIGHT: 169 LBS | HEIGHT: 65 IN | HEART RATE: 73 BPM

## 2024-06-20 DIAGNOSIS — N89.8 VAGINAL ITCHING: Primary | ICD-10-CM

## 2024-06-20 DIAGNOSIS — B96.89 BV (BACTERIAL VAGINOSIS): ICD-10-CM

## 2024-06-20 DIAGNOSIS — N76.0 BV (BACTERIAL VAGINOSIS): ICD-10-CM

## 2024-06-20 PROCEDURE — 87210 SMEAR WET MOUNT SALINE/INK: CPT | Performed by: OBSTETRICS & GYNECOLOGY

## 2024-06-20 PROCEDURE — 99213 OFFICE O/P EST LOW 20 MIN: CPT | Performed by: OBSTETRICS & GYNECOLOGY

## 2024-06-20 RX ORDER — METRONIDAZOLE 500 MG/1
500 TABLET ORAL EVERY 12 HOURS SCHEDULED
Qty: 14 TABLET | Refills: 0 | Status: SHIPPED | OUTPATIENT
Start: 2024-06-20 | End: 2024-06-27

## 2024-06-20 NOTE — PROGRESS NOTES
PROBLEM GYNECOLOGICAL VISIT    Shelby Oliveira is a 34 y.o. female who presents today with complaint of vaginal itching.  Her general medical history has been reviewed and she reports it as follows:    Past Medical History:   Diagnosis Date    Anxiety     no medications    Chlamydia     early     Urogenital trichomoniasis 2019     Past Surgical History:   Procedure Laterality Date    INDUCED       Surgically induced. Description: 2012      OB History          2    Para   1    Term   1       0    AB   1    Living   1         SAB   0    IAB   1    Ectopic   0    Multiple   0    Live Births   1               Social History     Tobacco Use    Smoking status: Never     Passive exposure: Never    Smokeless tobacco: Never    Tobacco comments:     Hookah socially   Vaping Use    Vaping status: Every Day    Substances: Nicotine, Flavoring   Substance Use Topics    Alcohol use: Yes     Comment: occasionally but stopped when found out pregnant    Drug use: Never     Social History     Substance and Sexual Activity   Sexual Activity Yes    Partners: Male    Birth control/protection: Pill       Current Outpatient Medications   Medication Instructions    cetirizine (ZYRTEC) 10 mg, Oral, Daily    montelukast (SINGULAIR) 10 mg, Oral, Daily at bedtime    norgestimate-ethinyl estradiol (ORTHO-CYCLEN) 0.25-35 MG-MCG per tablet 1 tablet, Oral, Daily    Prenatal Vit-Fe Fumarate-FA (M-Camryn Plus) 27-1 MG TABS TAKE 1 TABLET BY MOUTH EVERY DAY IN THE MORNING       History of Present Illness:   Patient presents today with a 2 day h/o vaginal itching. Patient stated had her menses and used tampons that was in the house and not her normal ones.    Review of Systems:  Review of Systems   Genitourinary:  Negative for pelvic pain, vaginal bleeding and vaginal discharge.        Vaginal itching   All other systems reviewed and are negative.      Physical Exam:  /83 (BP Location: Left arm, Patient Position:  "Sitting, Cuff Size: Standard)   Pulse 73   Ht 5' 5\" (1.651 m)   Wt 76.7 kg (169 lb)   LMP 06/16/2024 (Exact Date)   Breastfeeding No   BMI 28.12 kg/m²   Physical Exam  Constitutional:       Appearance: Normal appearance.   Genitourinary:      Bladder and urethral meatus normal.      No lesions in the vagina.      Right Labia: No rash, tenderness or lesions.     Left Labia: No tenderness or rash.     No vaginal discharge.      No urethral tenderness or mass present.   Neurological:      Mental Status: She is alert.   Vitals reviewed.         Point of Care Testing:   -Wet mount: abnormal    Assessment:     1. BV    Plan:   1. Flagyl sent   2. Return to office prn.       Reviewed with patient that test results are available in [employer] [facility] immediately, but that they will not necessarily be reviewed by me immediately.  Explained that I will review results at my earliest opportunity and contact patient appropriately.  "

## 2024-07-08 ENCOUNTER — TELEPHONE (OUTPATIENT)
Age: 34
End: 2024-07-08

## 2024-07-08 DIAGNOSIS — Z00.00 WELL ADULT EXAM: Primary | ICD-10-CM

## 2024-07-08 NOTE — TELEPHONE ENCOUNTER
Patient called and need the [facility] lab entered for work.  Pls advise if we can not honor request. [phone]

## 2024-07-10 ENCOUNTER — APPOINTMENT (OUTPATIENT)
Dept: LAB | Facility: HOSPITAL | Age: 34
End: 2024-07-10
Payer: COMMERCIAL

## 2024-07-10 DIAGNOSIS — Z00.00 WELL ADULT EXAM: ICD-10-CM

## 2024-07-10 PROCEDURE — 86480 TB TEST CELL IMMUN MEASURE: CPT

## 2024-07-10 PROCEDURE — 36415 COLL VENOUS BLD VENIPUNCTURE: CPT

## 2024-07-11 LAB
GAMMA INTERFERON BACKGROUND BLD IA-ACNC: 0.01 IU/ML
M TB IFN-G BLD-IMP: NEGATIVE
M TB IFN-G CD4+ BCKGRND COR BLD-ACNC: 0.02 IU/ML
M TB IFN-G CD4+ BCKGRND COR BLD-ACNC: 0.02 IU/ML
MITOGEN IGNF BCKGRD COR BLD-ACNC: 9.99 IU/ML

## 2024-07-31 ENCOUNTER — TELEPHONE (OUTPATIENT)
Dept: OBGYN CLINIC | Facility: CLINIC | Age: 34
End: 2024-07-31

## 2024-07-31 ENCOUNTER — OFFICE VISIT (OUTPATIENT)
Dept: OBGYN CLINIC | Facility: CLINIC | Age: 34
End: 2024-07-31

## 2024-07-31 VITALS
BODY MASS INDEX: 29.36 KG/M2 | SYSTOLIC BLOOD PRESSURE: 134 MMHG | HEART RATE: 68 BPM | WEIGHT: 176.2 LBS | DIASTOLIC BLOOD PRESSURE: 85 MMHG | HEIGHT: 65 IN

## 2024-07-31 DIAGNOSIS — B37.9 YEAST INFECTION: Primary | ICD-10-CM

## 2024-07-31 LAB
BV WHIFF TEST VAG QL: NEGATIVE
CLUE CELLS SPEC QL WET PREP: NEGATIVE
PH SMN: 4.5 [PH]
SL AMB POCT WET MOUNT: ABNORMAL
YEAST VAG QL WET PREP: POSITIVE

## 2024-07-31 RX ORDER — FLUCONAZOLE 150 MG/1
150 TABLET ORAL ONCE
Qty: 1 TABLET | Refills: 1 | Status: SHIPPED | OUTPATIENT
Start: 2024-07-31 | End: 2024-07-31

## 2024-07-31 NOTE — PATIENT INSTRUCTIONS
Take Fluconazole as directed  Wear loosee clothes and cotton unserwear  Annual GYN exam is due after 9/19/2024

## 2024-07-31 NOTE — PROGRESS NOTES
"Assessment/Plan:     Diagnoses and all orders for this visit:    Yeast infection  -     POCT wet mount  -     fluconazole (DIFLUCAN) 150 mg tablet; Take 1 tablet (150 mg total) by mouth once for 1 dose      Plan  Take Fluconazole as directed  Wear loosee clothes and cotton unserwear  Annual GYN exam is due after 9/19/2024  Pt verbalized understanding of all discussed.      Subjective:      Patient ID: Shelby Oliveira is a 34 y.o. female.    HPI  Pt presents with a few days of vaginal discharge and vulvar itching  Explained wet mount was positive for yeast, safe and effective use of Fluconazole was provided, discussed comfort measures  9/19/2023 WNL PAP and negative HPV      The following portions of the patient's history were reviewed and updated as appropriate: allergies, current medications, past family history, past medical history, past social history, past surgical history and problem list.    Review of Systems    .Pertinent items are note in the HPI      Objective:      /85 (BP Location: Left arm, Patient Position: Sitting)   Pulse 68   Ht 5' 5\" (1.651 m)   Wt 79.9 kg (176 lb 3.2 oz)   LMP 07/16/2024 (Exact Date)   BMI 29.32 kg/m²          Physical Exam    Alert and oriented  Denies pain  WNL respiratory effort, negative cough or SOB  Vulva negative lesions, slight erythema  Vagina erythema, positive thick white discharge   Cervix positive thick white discharge, negative lesions  Wet mount positive for yeast  "

## 2024-07-31 NOTE — TELEPHONE ENCOUNTER
Pt calling due to recurring bv symptoms, was seen 6/20/24. Due to work schedule it is difficult for pt to come into office, would like for medication to be sent instead.

## 2024-08-30 ENCOUNTER — HOSPITAL ENCOUNTER (EMERGENCY)
Facility: HOSPITAL | Age: 34
Discharge: HOME | End: 2024-08-31
Attending: EMERGENCY MEDICINE
Payer: COMMERCIAL

## 2024-08-30 DIAGNOSIS — R10.84 GENERALIZED ABDOMINAL PAIN: Primary | ICD-10-CM

## 2024-08-30 DIAGNOSIS — R68.83 CHILLS: ICD-10-CM

## 2024-08-30 PROCEDURE — 99283 EMERGENCY DEPT VISIT LOW MDM: CPT

## 2024-08-30 PROCEDURE — 99284 EMERGENCY DEPT VISIT MOD MDM: CPT

## 2024-08-30 NOTE — Clinical Note
Shelby Oliveira was seen and treated in our emergency department on 8/30/2024.                Diagnosis: Gastroenteritis.    Shelby  may return to work on return date.    She may return on this date: 09/01/2024         If you have any questions or concerns, please don't hesitate to call.      Christopher Abbott PA-C    ______________________________           _______________          _______________  Hospital Representative                              Date                                Time

## 2024-08-31 VITALS
BODY MASS INDEX: 30.08 KG/M2 | RESPIRATION RATE: 16 BRPM | WEIGHT: 180.78 LBS | OXYGEN SATURATION: 98 % | TEMPERATURE: 98.2 F | DIASTOLIC BLOOD PRESSURE: 96 MMHG | HEART RATE: 75 BPM | SYSTOLIC BLOOD PRESSURE: 152 MMHG

## 2024-08-31 LAB
ALBUMIN SERPL BCG-MCNC: 3.8 G/DL (ref 3.5–5)
ALP SERPL-CCNC: 75 U/L (ref 34–104)
ALT SERPL W P-5'-P-CCNC: 10 U/L (ref 7–52)
ANION GAP SERPL CALCULATED.3IONS-SCNC: 6 MMOL/L (ref 4–13)
AST SERPL W P-5'-P-CCNC: 12 U/L (ref 13–39)
BACTERIA UR QL AUTO: ABNORMAL /HPF
BASOPHILS # BLD AUTO: 0.02 THOUSANDS/ÂΜL (ref 0–0.1)
BASOPHILS NFR BLD AUTO: 0 % (ref 0–1)
BILIRUB SERPL-MCNC: 0.52 MG/DL (ref 0.2–1)
BILIRUB UR QL STRIP: NEGATIVE
BUN SERPL-MCNC: 9 MG/DL (ref 5–25)
CALCIUM SERPL-MCNC: 8.3 MG/DL (ref 8.4–10.2)
CHLORIDE SERPL-SCNC: 107 MMOL/L (ref 96–108)
CLARITY UR: CLEAR
CO2 SERPL-SCNC: 24 MMOL/L (ref 21–32)
COLOR UR: ABNORMAL
CREAT SERPL-MCNC: 0.63 MG/DL (ref 0.6–1.3)
EOSINOPHIL # BLD AUTO: 0 THOUSAND/ÂΜL (ref 0–0.61)
EOSINOPHIL NFR BLD AUTO: 0 % (ref 0–6)
ERYTHROCYTE [DISTWIDTH] IN BLOOD BY AUTOMATED COUNT: 11.7 % (ref 11.6–15.1)
EXT PREGNANCY TEST URINE: NEGATIVE
EXT. CONTROL: NORMAL
FLUAV RNA RESP QL NAA+PROBE: NEGATIVE
FLUBV RNA RESP QL NAA+PROBE: NEGATIVE
GFR SERPL CREATININE-BSD FRML MDRD: 119 ML/MIN/1.73SQ M
GLUCOSE SERPL-MCNC: 115 MG/DL (ref 65–140)
GLUCOSE UR STRIP-MCNC: NEGATIVE MG/DL
HCT VFR BLD AUTO: 34.9 % (ref 34.8–46.1)
HGB BLD-MCNC: 11.5 G/DL (ref 11.5–15.4)
HGB UR QL STRIP.AUTO: NEGATIVE
IMM GRANULOCYTES # BLD AUTO: 0.03 THOUSAND/UL (ref 0–0.2)
IMM GRANULOCYTES NFR BLD AUTO: 0 % (ref 0–2)
KETONES UR STRIP-MCNC: NEGATIVE MG/DL
LEUKOCYTE ESTERASE UR QL STRIP: NEGATIVE
LIPASE SERPL-CCNC: 36 U/L (ref 11–82)
LYMPHOCYTES # BLD AUTO: 1.19 THOUSANDS/ÂΜL (ref 0.6–4.47)
LYMPHOCYTES NFR BLD AUTO: 15 % (ref 14–44)
MCH RBC QN AUTO: 28.9 PG (ref 26.8–34.3)
MCHC RBC AUTO-ENTMCNC: 33 G/DL (ref 31.4–37.4)
MCV RBC AUTO: 88 FL (ref 82–98)
MONOCYTES # BLD AUTO: 0.36 THOUSAND/ÂΜL (ref 0.17–1.22)
MONOCYTES NFR BLD AUTO: 5 % (ref 4–12)
MUCOUS THREADS UR QL AUTO: ABNORMAL
NEUTROPHILS # BLD AUTO: 6.15 THOUSANDS/ÂΜL (ref 1.85–7.62)
NEUTS SEG NFR BLD AUTO: 80 % (ref 43–75)
NITRITE UR QL STRIP: NEGATIVE
NON-SQ EPI CELLS URNS QL MICRO: ABNORMAL /HPF
NRBC BLD AUTO-RTO: 0 /100 WBCS
PH UR STRIP.AUTO: 8 [PH]
PLATELET # BLD AUTO: 252 THOUSANDS/UL (ref 149–390)
PMV BLD AUTO: 9.9 FL (ref 8.9–12.7)
POTASSIUM SERPL-SCNC: 3.9 MMOL/L (ref 3.5–5.3)
PROT SERPL-MCNC: 6.1 G/DL (ref 6.4–8.4)
PROT UR STRIP-MCNC: ABNORMAL MG/DL
RBC # BLD AUTO: 3.98 MILLION/UL (ref 3.81–5.12)
RBC #/AREA URNS AUTO: ABNORMAL /HPF
RSV RNA RESP QL NAA+PROBE: NEGATIVE
SARS-COV-2 RNA RESP QL NAA+PROBE: NEGATIVE
SODIUM SERPL-SCNC: 137 MMOL/L (ref 135–147)
SP GR UR STRIP.AUTO: 1.02 (ref 1–1.03)
UROBILINOGEN UR STRIP-ACNC: 3 MG/DL
WBC # BLD AUTO: 7.75 THOUSAND/UL (ref 4.31–10.16)
WBC #/AREA URNS AUTO: ABNORMAL /HPF

## 2024-08-31 PROCEDURE — 36415 COLL VENOUS BLD VENIPUNCTURE: CPT

## 2024-08-31 PROCEDURE — 96374 THER/PROPH/DIAG INJ IV PUSH: CPT

## 2024-08-31 PROCEDURE — 83690 ASSAY OF LIPASE: CPT

## 2024-08-31 PROCEDURE — 81025 URINE PREGNANCY TEST: CPT

## 2024-08-31 PROCEDURE — 85025 COMPLETE CBC W/AUTO DIFF WBC: CPT

## 2024-08-31 PROCEDURE — 81001 URINALYSIS AUTO W/SCOPE: CPT

## 2024-08-31 PROCEDURE — 80053 COMPREHEN METABOLIC PANEL: CPT

## 2024-08-31 PROCEDURE — 0241U HB NFCT DS VIR RESP RNA 4 TRGT: CPT

## 2024-08-31 PROCEDURE — 96361 HYDRATE IV INFUSION ADD-ON: CPT

## 2024-08-31 RX ORDER — DICYCLOMINE HCL 20 MG
20 TABLET ORAL 2 TIMES DAILY
Qty: 20 TABLET | Refills: 0 | Status: SHIPPED | OUTPATIENT
Start: 2024-08-31

## 2024-08-31 RX ORDER — KETOROLAC TROMETHAMINE 30 MG/ML
15 INJECTION, SOLUTION INTRAMUSCULAR; INTRAVENOUS ONCE
Status: COMPLETED | OUTPATIENT
Start: 2024-08-31 | End: 2024-08-31

## 2024-08-31 RX ORDER — ONDANSETRON 4 MG/1
4 TABLET, ORALLY DISINTEGRATING ORAL EVERY 8 HOURS PRN
Qty: 12 TABLET | Refills: 0 | Status: SHIPPED | OUTPATIENT
Start: 2024-08-31

## 2024-08-31 RX ADMIN — KETOROLAC TROMETHAMINE 15 MG: 30 INJECTION, SOLUTION INTRAMUSCULAR; INTRAVENOUS at 01:25

## 2024-08-31 RX ADMIN — SODIUM CHLORIDE 1000 ML: 0.9 INJECTION, SOLUTION INTRAVENOUS at 00:37

## 2024-08-31 NOTE — ED PROVIDER NOTES
History  Chief Complaint   Patient presents with    Generalized Body Aches     Generalized body aches, chills, abd pain, fever 100.2 starting earlier today. Tylenol taken 1hr pta. States took home covid test which was negative but also .     34-year-old female presents for evaluation of flulike symptoms since this morning.  Complaining of myalgias, generalized abdominal pain, chills, fever.  Took a COVID test at home that was negative.  Took Tylenol earlier with minimal relief.  No dysuria, difficulty urinating, hematuria, SOB, cough, chest pain, congestion, sore throat, nausea, vomiting, diarrhea.  Patient's daughter did recently have similar symptoms.        Prior to Admission Medications   Prescriptions Last Dose Informant Patient Reported? Taking?   Prenatal Vit-Fe Fumarate-FA (M-Camryn Plus) 27-1 MG TABS   No No   Sig: TAKE 1 TABLET BY MOUTH EVERY DAY IN THE MORNING   cetirizine (ZyrTEC) 10 mg tablet   Yes No   Sig: Take 10 mg by mouth daily   montelukast (SINGULAIR) 10 mg tablet   No No   Sig: Take 1 tablet (10 mg total) by mouth daily at bedtime   norgestimate-ethinyl estradiol (ORTHO-CYCLEN) 0.25-35 MG-MCG per tablet   No No   Sig: Take 1 tablet by mouth daily      Facility-Administered Medications: None       Past Medical History:   Diagnosis Date    Anxiety     no medications    Chlamydia     early     Urogenital trichomoniasis 2019       Past Surgical History:   Procedure Laterality Date    INDUCED       Surgically induced. Description: 2012        Family History   Problem Relation Age of Onset    Hypertension Mother     Thyroid disease Mother     Asthma Mother     Breast cancer Neg Hx     Colon cancer Neg Hx     Ovarian cancer Neg Hx     Cancer Neg Hx     Diabetes Neg Hx     Venous thrombosis Neg Hx      I have reviewed and agree with the history as documented.    E-Cigarette/Vaping    E-Cigarette Use Former User      E-Cigarette/Vaping Substances    Nicotine Yes     THC No     CBD No      Flavoring Yes     Other No     Unknown No      Social History     Tobacco Use    Smoking status: Never     Passive exposure: Never    Smokeless tobacco: Never    Tobacco comments:     Hookah socially   Vaping Use    Vaping status: Former    Substances: Nicotine, Flavoring   Substance Use Topics    Alcohol use: Not Currently     Comment: occasionally but stopped when found out pregnant    Drug use: Never       Review of Systems   Constitutional:  Positive for chills and fever.   HENT:  Negative for ear pain and sore throat.    Eyes:  Negative for pain and visual disturbance.   Respiratory:  Negative for cough and shortness of breath.    Cardiovascular:  Negative for chest pain and palpitations.   Gastrointestinal:  Positive for abdominal pain. Negative for vomiting.   Genitourinary:  Negative for dysuria and hematuria.   Musculoskeletal:  Positive for myalgias. Negative for arthralgias and back pain.   Skin:  Negative for color change and rash.   Neurological:  Negative for seizures and syncope.   All other systems reviewed and are negative.      Physical Exam  Physical Exam  Vitals and nursing note reviewed.   Constitutional:       General: She is in acute distress (uncomfortable).      Appearance: She is well-developed.   HENT:      Head: Normocephalic and atraumatic.   Eyes:      Conjunctiva/sclera: Conjunctivae normal.   Cardiovascular:      Rate and Rhythm: Normal rate and regular rhythm.      Heart sounds: No murmur heard.  Pulmonary:      Effort: Pulmonary effort is normal. No respiratory distress.      Breath sounds: Normal breath sounds.   Abdominal:      General: Abdomen is flat. Bowel sounds are increased. There is no distension.      Palpations: Abdomen is soft.      Tenderness: There is abdominal tenderness in the periumbilical area and suprapubic area. There is no guarding.   Musculoskeletal:         General: No swelling.      Cervical back: Neck supple.   Skin:     General: Skin is warm and dry.       Capillary Refill: Capillary refill takes less than 2 seconds.   Neurological:      Mental Status: She is alert.   Psychiatric:         Mood and Affect: Mood normal.         Vital Signs  ED Triage Vitals   Temperature Pulse Respirations Blood Pressure SpO2   08/30/24 2355 08/30/24 2355 08/30/24 2355 08/30/24 2355 08/30/24 2355   98.2 °F (36.8 °C) (!) 111 18 158/91 98 %      Temp Source Heart Rate Source Patient Position - Orthostatic VS BP Location FiO2 (%)   08/30/24 2355 08/30/24 2355 08/30/24 2355 08/30/24 2355 --   Oral Monitor Sitting Right arm       Pain Score       08/31/24 0125       10 - Worst Possible Pain           Vitals:    08/30/24 2355 08/31/24 0225   BP: 158/91 152/96   Pulse: (!) 111 75   Patient Position - Orthostatic VS: Sitting Lying         Visual Acuity      ED Medications  Medications   sodium chloride 0.9 % bolus 1,000 mL (0 mL Intravenous Stopped 8/31/24 0225)   ketorolac (TORADOL) injection 15 mg (15 mg Intravenous Given 8/31/24 0125)       Diagnostic Studies  Results Reviewed       Procedure Component Value Units Date/Time    FLU/RSV/COVID - if FLU/RSV clinically relevant [[id number]]  (Normal) Collected: 08/31/24 0052    Lab Status: Final result Specimen: Nares from Nose Updated: 08/31/24 0134     SARS-CoV-2 Negative     INFLUENZA A PCR Negative     INFLUENZA B PCR Negative     RSV PCR Negative    Narrative:      This test has been performed using the CoV-2/Flu/RSV plus assay on the [employer] GeneXpert platform. This test has been validated by the  and verified by the performing laboratory.     This test is designed to amplify and detect the following: nucleocapsid (N), envelope (E), and RNA-dependent RNA polymerase (RdRP) genes of the SARS-CoV-2 genome; matrix (M), basic polymerase (PB2), and acidic protein (PA) segments of the influenza A genome; matrix (M) and non-structural protein (NS) segments of the influenza B genome, and the nucleocapsid genes of RSV A and RSV B.      Positive results are indicative of the presence of Flu A, Flu B, RSV, and/or SARS-CoV-2 RNA. Positive results for SARS-CoV-2 or suspected novel influenza should be reported to state, local, or federal health departments according to local reporting requirements.      All results should be assessed in conjunction with clinical presentation and other laboratory markers for clinical management.     FOR PEDIATRIC PATIENTS - copy/paste COVID Guidelines URL to browser: [email or web address]       Urinalysis with microscopic [[id number]]  (Abnormal) Collected: 08/31/24 0119    Lab Status: Final result Specimen: Urine, Clean Catch Updated: 08/31/24 0134     Color, UA Light Yellow     Clarity, UA Clear     Specific Gravity, UA 1.022     pH, UA 8.0     Leukocytes, UA Negative     Nitrite, UA Negative     Protein, UA Trace mg/dl      Glucose, UA Negative mg/dl      Ketones, UA Negative mg/dl      Urobilinogen, UA 3.0 mg/dl      Bilirubin, UA Negative     Occult Blood, UA Negative     RBC, UA 1-2 /hpf      WBC, UA 1-2 /hpf      Epithelial Cells Occasional /hpf      Bacteria, UA Occasional /hpf      MUCUS THREADS Occasional    POCT pregnancy, urine [[id number]]  (Normal) Resulted: 08/31/24 0124    Lab Status: Final result Updated: 08/31/24 0124     EXT Preg Test, Ur Negative     Control Valid    Comprehensive metabolic panel [[id number]]  (Abnormal) Collected: 08/31/24 0037    Lab Status: Final result Specimen: Blood from Arm, Left Updated: 08/31/24 0058     Sodium 137 mmol/L      Potassium 3.9 mmol/L      Chloride 107 mmol/L      CO2 24 mmol/L      ANION GAP 6 mmol/L      BUN 9 mg/dL      Creatinine 0.63 mg/dL      Glucose 115 mg/dL      Calcium 8.3 mg/dL      AST 12 U/L      ALT 10 U/L      Alkaline Phosphatase 75 U/L      Total Protein 6.1 g/dL      Albumin 3.8 g/dL      Total Bilirubin 0.52 mg/dL      eGFR 119 ml/min/1.73sq m     Narrative:      National Kidney Disease  Foundation guidelines for Chronic Kidney Disease (CKD):     Stage 1 with normal or high GFR (GFR > 90 mL/min/1.73 square meters)    Stage 2 Mild CKD (GFR = 60-89 mL/min/1.73 square meters)    Stage 3A Moderate CKD (GFR = 45-59 mL/min/1.73 square meters)    Stage 3B Moderate CKD (GFR = 30-44 mL/min/1.73 square meters)    Stage 4 Severe CKD (GFR = 15-29 mL/min/1.73 square meters)    Stage 5 End Stage CKD (GFR <15 mL/min/1.73 square meters)  Note: GFR calculation is accurate only with a steady state creatinine    Lipase [[id number]]  (Normal) Collected: 08/31/24 0037    Lab Status: Final result Specimen: Blood from Arm, Left Updated: 08/31/24 0058     Lipase 36 u/L     CBC and differential [[id number]]  (Abnormal) Collected: 08/31/24 0037    Lab Status: Final result Specimen: Blood from Arm, Left Updated: 08/31/24 0043     WBC 7.75 Thousand/uL      RBC 3.98 Million/uL      Hemoglobin 11.5 g/dL      Hematocrit 34.9 %      MCV 88 fL      MCH 28.9 pg      MCHC 33.0 g/dL      RDW 11.7 %      MPV 9.9 fL      Platelets 252 Thousands/uL      nRBC 0 /100 WBCs      Segmented % 80 %      Immature Grans % 0 %      Lymphocytes % 15 %      Monocytes % 5 %      Eosinophils Relative 0 %      Basophils Relative 0 %      Absolute Neutrophils 6.15 Thousands/µL      Absolute Immature Grans 0.03 Thousand/uL      Absolute Lymphocytes 1.19 Thousands/µL      Absolute Monocytes 0.36 Thousand/µL      Eosinophils Absolute 0.00 Thousand/µL      Basophils Absolute 0.02 Thousands/µL                    No orders to display              Procedures  Procedures         ED Course  ED Course as of 08/31/24 0548   Sat Aug 31, 2024   0044 WBC: 7.75  No leukocytosis.                                               Medical Decision Making  34-year-old female presents for evaluation of flulike symptoms since this morning.  Daughter recently had similar symptoms.  Exam: Patient appears uncomfortable but nontoxic, AOx3, somewhat tachycardic on arrival, afebrile;  mild TTP of suprapubic and periumbilical region, hyperactive bowel sounds, abdomen nondistended.  Workup: CBC, CMP, lipase, UA, POC pregnancy, COVID/flu/RSV.  Management: IV fluids, Toradol.    Lab work generally unremarkable.  No strong evidence to support emergent intra-abdominal conditions like appendicitis, cholecystitis, choledocholithiasis, pyelonephritis, etc.  Much more likely secondary to viral gastroenteritis.  Patient feeling some improvement with fluids and Toradol.  Did discuss the option of pursuing imaging, however after discussion we decided to proceed with discharge on supportive care.  Patient will follow up with PCP if symptoms persist and return to ER if worsening.  Patient expresses understanding of the condition, treatment plan, follow-up instructions, and return precautions.    Amount and/or Complexity of Data Reviewed  Labs: ordered. Decision-making details documented in ED Course.    Risk  Prescription drug management.                 Disposition  Final diagnoses:   Generalized abdominal pain   Chills     Time reflects when diagnosis was documented in both MDM as applicable and the Disposition within this note       Time User Action Codes Description Comment    8/31/2024  2:23 AM Christopher Abbott Add [R10.84] Generalized abdominal pain     8/31/2024  2:23 AM Christopher Abbott [R68.83] Chills           ED Disposition       ED Disposition   Discharge    Condition   Stable    Date/Time   Sat Aug 31, 2024  2:23 AM    Comment   Shelby Oliveira discharge to home/self care.                   Follow-up Information    None         Discharge Medication List as of 8/31/2024  2:24 AM        START taking these medications    Details   dicyclomine (BENTYL) 20 mg tablet Take 1 tablet (20 mg total) by mouth 2 (two) times a day, Starting Sat 8/31/2024, Normal      ondansetron (ZOFRAN-ODT) 4 mg disintegrating tablet Take 1 tablet (4 mg total) by mouth every 8 (eight) hours as needed for nausea or vomiting,  Starting Sat 2024, Normal           CONTINUE these medications which have NOT CHANGED    Details   cetirizine (ZyrTEC) 10 mg tablet Take 10 mg by mouth daily, Historical Med      montelukast (SINGULAIR) 10 mg tablet Take 1 tablet (10 mg total) by mouth daily at bedtime, Starting Tue 2024, Normal      norgestimate-ethinyl estradiol (ORTHO-CYCLEN) 0.25-35 MG-MCG per tablet Take 1 tablet by mouth daily, Starting Thu 2024, Normal      Prenatal Vit-Fe Fumarate-FA (M- Plus) 27-1 MG TABS TAKE 1 TABLET BY MOUTH EVERY DAY IN THE MORNING, Normal             No discharge procedures on file.    PDMP Review       None            ED Provider  Electronically Signed by             Christopher Abbott PA-C  [phone]

## 2024-08-31 NOTE — DISCHARGE INSTRUCTIONS
-Your symptoms are most likely due to a virus and should resolve spontaneously over the next week.  -Please stay well-hydrated and reintroduce foods as tolerated, such as BRAT diet (bananas, rice, applesauce, toast).  -Return to the emergency department if symptoms worsen.  -Follow up with your PCP for management if symptoms persist.  -You can try Bentyl 1-2 times daily as needed for abdominal pain.  If you get nauseous you take Zofran every 8 hours as needed.  He also try other medications like Tums, Tylenol, and Advil.

## 2024-09-04 ENCOUNTER — OFFICE VISIT (OUTPATIENT)
Dept: OBGYN CLINIC | Facility: CLINIC | Age: 34
End: 2024-09-04

## 2024-09-04 VITALS
SYSTOLIC BLOOD PRESSURE: 131 MMHG | DIASTOLIC BLOOD PRESSURE: 87 MMHG | BODY MASS INDEX: 29.59 KG/M2 | HEIGHT: 65 IN | HEART RATE: 71 BPM | WEIGHT: 177.6 LBS

## 2024-09-04 DIAGNOSIS — B37.9 YEAST INFECTION: Primary | ICD-10-CM

## 2024-09-04 PROCEDURE — 99213 OFFICE O/P EST LOW 20 MIN: CPT | Performed by: NURSE PRACTITIONER

## 2024-09-04 PROCEDURE — 87210 SMEAR WET MOUNT SALINE/INK: CPT | Performed by: NURSE PRACTITIONER

## 2024-09-04 NOTE — PROGRESS NOTES
"Assessment/Plan:     Diagnoses and all orders for this visit:    Yeast infection  -     POCT wet mount      Plan  Call with needs or concerns  Annual GYN exam is due after 9/19/2024  Pt verbalized understanding of all discussed.      Subjective:      Patient ID: Shelby Oliveira is a 34 y.o. female.    HPI  Pt presents with C/O vulva itching for several days  Pt states she took a Fluconazole yesterday but wanted an exam to be sure it was only a yeast infection  Last WNL PAP was 9/19/2023 and negative HPV    Explained wet mount was positive for yeast discussed comfort measures. Advised to call with continued symptoms    Depression Screening Follow-up Plan: Patient's depression screening was negative with a PHQ-2 score of 0. Their PHQ-9 score was 1. Clinically patient does not have depression. No treatment is required.        Patient's history were reviewed and updated as appropriate: allergies, current medications, past family history, past medical history, past social history, past surgical history and problem list.    Review of Systems    .Pertinent items are note in the HPI      Objective:      /87 (BP Location: Right arm, Patient Position: Sitting)   Pulse 71   Ht 5' 5\" (1.651 m)   Wt 80.6 kg (177 lb 9.6 oz)   LMP 08/13/2024 (Exact Date)   BMI 29.55 kg/m²          Physical Exam    Alert and oriented  Denies pain  WNL respiratory effort, negative cough or SOb  Vulva negative lesions, slight erythema  Vagina erythema, positive thick white discharge   Cervix positive thick white discharge, negative lesions  Wet mount positive for yeast  "

## 2024-09-25 ENCOUNTER — ANNUAL EXAM (OUTPATIENT)
Dept: OBGYN CLINIC | Facility: CLINIC | Age: 34
End: 2024-09-25

## 2024-09-25 ENCOUNTER — APPOINTMENT (OUTPATIENT)
Dept: LAB | Facility: HOSPITAL | Age: 34
End: 2024-09-25
Payer: COMMERCIAL

## 2024-09-25 ENCOUNTER — TELEPHONE (OUTPATIENT)
Dept: OBGYN CLINIC | Facility: CLINIC | Age: 34
End: 2024-09-25

## 2024-09-25 VITALS
HEIGHT: 65 IN | SYSTOLIC BLOOD PRESSURE: 129 MMHG | BODY MASS INDEX: 28.99 KG/M2 | DIASTOLIC BLOOD PRESSURE: 91 MMHG | HEART RATE: 81 BPM | WEIGHT: 174 LBS

## 2024-09-25 DIAGNOSIS — Z01.419 ROUTINE GYNECOLOGICAL EXAMINATION: Primary | ICD-10-CM

## 2024-09-25 DIAGNOSIS — N92.1 BREAKTHROUGH BLEEDING ON OCPS: ICD-10-CM

## 2024-09-25 DIAGNOSIS — Z11.3 SCREEN FOR STD (SEXUALLY TRANSMITTED DISEASE): ICD-10-CM

## 2024-09-25 LAB — B-HCG SERPL-ACNC: <0.6 MIU/ML (ref 0–5)

## 2024-09-25 PROCEDURE — 87591 N.GONORRHOEAE DNA AMP PROB: CPT | Performed by: OBSTETRICS & GYNECOLOGY

## 2024-09-25 PROCEDURE — 87491 CHLMYD TRACH DNA AMP PROBE: CPT | Performed by: OBSTETRICS & GYNECOLOGY

## 2024-09-25 PROCEDURE — 99395 PREV VISIT EST AGE 18-39: CPT | Performed by: OBSTETRICS & GYNECOLOGY

## 2024-09-25 PROCEDURE — 84702 CHORIONIC GONADOTROPIN TEST: CPT | Performed by: OBSTETRICS & GYNECOLOGY

## 2024-09-25 NOTE — PROGRESS NOTES
ANNUAL GYNECOLOGICAL EXAMINATION    Shelby Oliveira is a 34 y.o. female who presents today for annual GYN exam.  Her last pap smear was performed 23 and result was negative.  She reports no history of abnormal pap smears in her past.   She had HIV screening performed 24 and it was negative.  She reports menses as regular.  Patient's last menstrual period was 2024 (exact date).  Her general medical history has been reviewed and she reports it as follows:    Past Medical History:   Diagnosis Date    Anxiety     no medications    Chlamydia     early     Urogenital trichomoniasis      Past Surgical History:   Procedure Laterality Date    INDUCED       Surgically induced. Description: 2012      OB History          2    Para   1    Term   1       0    AB   1    Living   1         SAB   0    IAB   1    Ectopic   0    Multiple   0    Live Births   1               Social History     Tobacco Use    Smoking status: Never     Passive exposure: Never    Smokeless tobacco: Never    Tobacco comments:     Hookah socially   Vaping Use    Vaping status: Former    Substances: Nicotine, Flavoring   Substance Use Topics    Alcohol use: Yes     Comment: occasionally but stopped when found out pregnant    Drug use: Never     Social History     Substance and Sexual Activity   Sexual Activity Yes    Partners: Male    Birth control/protection: Pill, OCP     Cancer-related family history is negative for Breast cancer, Colon cancer, Ovarian cancer, and Cancer.    Current Outpatient Medications   Medication Instructions    cetirizine (ZYRTEC) 10 mg, Oral, Daily    dicyclomine (BENTYL) 20 mg, Oral, 2 times daily    montelukast (SINGULAIR) 10 mg, Oral, Daily at bedtime    norgestimate-ethinyl estradiol (ORTHO-CYCLEN) 0.25-35 MG-MCG per tablet 1 tablet, Oral, Daily    ondansetron (ZOFRAN-ODT) 4 mg, Oral, Every 8 hours PRN    Prenatal Vit-Fe Fumarate-FA (M-Camryn Plus) 27-1 MG TABS TAKE 1 TABLET BY  "MOUTH EVERY DAY IN THE MORNING       Review of Systems:  Review of Systems   Genitourinary:  Negative for pelvic pain, vaginal bleeding and vaginal discharge.   All other systems reviewed and are negative.      Physical Exam:  /91 (BP Location: Left arm, Patient Position: Sitting, Cuff Size: Standard)   Pulse 81   Ht 5' 5\" (1.651 m)   Wt 78.9 kg (174 lb)   LMP 09/06/2024 (Exact Date)   BMI 28.96 kg/m²   Physical Exam  Constitutional:       Appearance: Normal appearance.   Genitourinary:      Bladder and urethral meatus normal.      No lesions in the vagina.      Right Labia: No rash, tenderness or lesions.     Left Labia: No tenderness, lesions or rash.     No inguinal adenopathy present in the right or left side.     No vaginal discharge.        Right Adnexa: not tender, not full and no mass present.     Left Adnexa: not tender, not full and no mass present.     No cervical motion tenderness, discharge or lesion.      Uterus is not enlarged or tender.      No uterine mass detected.     No urethral tenderness or mass present.   Breasts:     Breasts are soft.     Right: No swelling, inverted nipple, mass, nipple discharge, skin change or tenderness.      Left: No swelling, inverted nipple, mass, nipple discharge, skin change or tenderness.   HENT:      Head: Normocephalic and atraumatic.   Cardiovascular:      Rate and Rhythm: Normal rate and regular rhythm.   Pulmonary:      Effort: Pulmonary effort is normal.      Breath sounds: Normal breath sounds.   Abdominal:      General: Bowel sounds are normal.      Palpations: Abdomen is soft.      Hernia: There is no hernia in the left inguinal area or right inguinal area.   Musculoskeletal:         General: Normal range of motion.      Cervical back: Normal range of motion and neck supple.   Lymphadenopathy:      Upper Body:      Right upper body: No supraclavicular or axillary adenopathy.      Left upper body: No supraclavicular or axillary adenopathy.      " Lower Body: No right inguinal adenopathy. No left inguinal adenopathy.   Neurological:      Mental Status: She is alert and oriented to person, place, and time.   Skin:     General: Skin is warm and dry.   Psychiatric:         Mood and Affect: Mood normal.   Vitals reviewed.           Assessment/Plan:   1. Normal well-woman GYN exam.  2. Cervical cancer screening:  Normal cervical exam.    3. STD screening:  Patient declines.     4. Breast cancer screening:  Normal breast exam.  Reviewed breast self-awareness.   5. Depression Screening: Patient's depression screening was assessed with a PHQ-2 score of 2.    6. BMI Counseling: Body mass index is 28.96 kg/m². Discussed the patient's BMI with her. The BMI is above normal. Nutrition recommendations include decreasing overall calorie intake, moderation in carbohydrate intake, increasing intake of lean protein, reducing intake of saturated fat and trans fat, and reducing intake of cholesterol.   7. Contraception:  OCP   8. Return to office 1yr/prn.    Reviewed with patient that [facility], but that they will not necessarily be reviewed by me immediately.  Explained that I will review results at my earliest opportunity and contact patient appropriately.

## 2024-09-26 LAB
C TRACH DNA SPEC QL NAA+PROBE: NEGATIVE
N GONORRHOEA DNA SPEC QL NAA+PROBE: NEGATIVE

## 2024-10-23 ENCOUNTER — OFFICE VISIT (OUTPATIENT)
Dept: OBGYN CLINIC | Facility: CLINIC | Age: 34
End: 2024-10-23

## 2024-10-23 VITALS
HEIGHT: 65 IN | DIASTOLIC BLOOD PRESSURE: 90 MMHG | HEART RATE: 74 BPM | SYSTOLIC BLOOD PRESSURE: 144 MMHG | BODY MASS INDEX: 29.96 KG/M2 | WEIGHT: 179.8 LBS

## 2024-10-23 DIAGNOSIS — Z30.014 ENCOUNTER FOR INITIAL PRESCRIPTION OF INTRAUTERINE CONTRACEPTIVE DEVICE (IUD): Primary | ICD-10-CM

## 2024-10-23 DIAGNOSIS — Z11.3 SCREEN FOR STD (SEXUALLY TRANSMITTED DISEASE): ICD-10-CM

## 2024-10-23 PROCEDURE — 87591 N.GONORRHOEAE DNA AMP PROB: CPT | Performed by: OBSTETRICS & GYNECOLOGY

## 2024-10-23 PROCEDURE — 99213 OFFICE O/P EST LOW 20 MIN: CPT | Performed by: OBSTETRICS & GYNECOLOGY

## 2024-10-23 PROCEDURE — 87491 CHLMYD TRACH DNA AMP PROBE: CPT | Performed by: OBSTETRICS & GYNECOLOGY

## 2024-10-23 RX ORDER — MISOPROSTOL 200 UG/1
TABLET ORAL
Qty: 2 TABLET | Refills: 0 | Status: SHIPPED | OUTPATIENT
Start: 2024-10-23

## 2024-10-23 NOTE — PROGRESS NOTES
PROBLEM GYNECOLOGICAL VISIT    Shelby Oliveira is a 34 y.o. female who presents today to discuss contraceptive mangement.  Her general medical history has been reviewed and she reports it as follows:    Past Medical History:   Diagnosis Date    Anxiety     no medications    Chlamydia     early     Urogenital trichomoniasis      Past Surgical History:   Procedure Laterality Date    INDUCED       Surgically induced. Description: 2012      OB History          2    Para   1    Term   1       0    AB   1    Living   1         SAB   0    IAB   1    Ectopic   0    Multiple   0    Live Births   1               Social History     Tobacco Use    Smoking status: Never     Passive exposure: Never    Smokeless tobacco: Never    Tobacco comments:     Hookah socially   Vaping Use    Vaping status: Former    Substances: Nicotine, Flavoring   Substance Use Topics    Alcohol use: Yes     Comment: occasionally but stopped when found out pregnant    Drug use: Never     Social History     Substance and Sexual Activity   Sexual Activity Yes    Partners: Male    Birth control/protection: Pill, OCP       Current Outpatient Medications   Medication Instructions    cetirizine (ZYRTEC) 10 mg, Oral, Daily    dicyclomine (BENTYL) 20 mg, Oral, 2 times daily    miSOPROStol (Cytotec) 200 mcg tablet Take one tablet orally the night prior to procedure then Take one tablet insert intravaginally the morning of the procedure at 5:30am    montelukast (SINGULAIR) 10 mg, Oral, Daily at bedtime    norgestimate-ethinyl estradiol (ORTHO-CYCLEN) 0.25-35 MG-MCG per tablet 1 tablet, Oral, Daily    ondansetron (ZOFRAN-ODT) 4 mg, Oral, Every 8 hours PRN    Prenatal Vit-Fe Fumarate-FA (M-Camryn Plus) 27-1 MG TABS TAKE 1 TABLET BY MOUTH EVERY DAY IN THE MORNING       History of Present Illness:   Patient presents to discuss her options for contraceptive management and STI testing.    Review of Systems:  Review of Systems  "  Genitourinary:  Negative for pelvic pain, vaginal bleeding and vaginal discharge.   All other systems reviewed and are negative.      Physical Exam:  /90 (BP Location: Right arm, Patient Position: Sitting, Cuff Size: Standard)   Pulse 74   Ht 5' 5\" (1.651 m)   Wt 81.6 kg (179 lb 12.8 oz)   LMP 10/03/2024 (Exact Date)   BMI 29.92 kg/m²   Physical Exam  Constitutional:       Appearance: Normal appearance.   Neurological:      Mental Status: She is alert.   Vitals reviewed.         Point of Care Testing:   -urine pregnancy test: neg      Assessment:   1. Contraceptive management for copper iud    Plan:   1. Cultures ordered: GC/Chl   2. Paragard ordered   3. Cytotec escribed   4. Return to office 3wks.   5. Patient's depression screening was assessed with a PHQ-2 score of 0.  Clinically patient does not have depression. No treatment is required.    Reviewed with patient that [facility], but that they will not necessarily be reviewed by me immediately.  Explained that I will review results at my earliest opportunity and contact patient appropriately.  "

## 2024-10-24 LAB
C TRACH DNA SPEC QL NAA+PROBE: NEGATIVE
N GONORRHOEA DNA SPEC QL NAA+PROBE: NEGATIVE

## 2024-11-19 ENCOUNTER — PROCEDURE VISIT (OUTPATIENT)
Dept: OBGYN CLINIC | Facility: CLINIC | Age: 34
End: 2024-11-19

## 2024-11-19 VITALS
SYSTOLIC BLOOD PRESSURE: 131 MMHG | WEIGHT: 179.6 LBS | HEART RATE: 77 BPM | HEIGHT: 65 IN | BODY MASS INDEX: 29.92 KG/M2 | DIASTOLIC BLOOD PRESSURE: 91 MMHG

## 2024-11-19 DIAGNOSIS — Z30.430 ENCOUNTER FOR INSERTION OF COPPER IUD: Primary | ICD-10-CM

## 2024-11-19 LAB — SL AMB POCT URINE HCG: NEGATIVE

## 2024-11-19 RX ORDER — COPPER 313.4 MG/1
1 INTRAUTERINE DEVICE INTRAUTERINE
Status: COMPLETED | OUTPATIENT
Start: 2024-11-19 | End: 2024-11-19

## 2024-11-19 RX ORDER — COPPER 313.4 MG/1
1 INTRAUTERINE DEVICE INTRAUTERINE
OUTPATIENT
Start: 2024-11-19

## 2024-11-19 RX ADMIN — COPPER 1 INTRA UTERINE DEVICE: 313.4 INTRAUTERINE DEVICE INTRAUTERINE at 14:54

## 2024-11-19 RX ADMIN — COPPER 1 EACH: 313.4 INTRAUTERINE DEVICE INTRAUTERINE at 14:30

## 2024-11-19 NOTE — PROGRESS NOTES
IUD Procedure    Date/Time: 11/19/2024 2:30 PM    Performed by: Yardlie Toussaint-Foster, DO  Authorized by: Yardlie Toussaint-Foster, DO    Verbal consent obtained?: Yes    Written consent obtained?: Yes    Risks and benefits: Risks, benefits and alternatives were discussed    Consent given by:  Patient  Time Out:     Time out: Immediately prior to the procedure a time out was called      Time out performed at:  11/19/2024 2:30 PM  Patient states understanding of procedure being performed: Yes    Patient's understanding of procedure matches consent: Yes    Procedure consent matches procedure scheduled: Yes    Relevant documents present and verified: Yes    Patient identity confirmed:  Verbally with patient and provided demographic data  Select procedure: IUD insertion    IUD Insertion:     Negative GC/chlamydia test: yes      Negative urine pregnancy test: yes      Cervix cleaned and prepped: yes      Speculum placed in vagina: yes      Tenaculum applied to cervix: yes      IUD inserted with no complications: yes      Strings trimmed: yes      Uterus sounded: yes      Uterus sound depth (cm):  7    IUD type:  1 each intrauterine copper

## 2024-12-11 ENCOUNTER — OFFICE VISIT (OUTPATIENT)
Age: 34
End: 2024-12-11
Payer: COMMERCIAL

## 2024-12-11 VITALS
TEMPERATURE: 98.1 F | OXYGEN SATURATION: 98 % | DIASTOLIC BLOOD PRESSURE: 84 MMHG | HEIGHT: 65 IN | SYSTOLIC BLOOD PRESSURE: 132 MMHG | HEART RATE: 85 BPM | BODY MASS INDEX: 30.26 KG/M2 | WEIGHT: 181.6 LBS

## 2024-12-11 DIAGNOSIS — Z3A.11 11 WEEKS GESTATION OF PREGNANCY: ICD-10-CM

## 2024-12-11 DIAGNOSIS — G43.909 MIGRAINE WITHOUT STATUS MIGRAINOSUS, NOT INTRACTABLE, UNSPECIFIED MIGRAINE TYPE: Primary | ICD-10-CM

## 2024-12-11 PROCEDURE — 99214 OFFICE O/P EST MOD 30 MIN: CPT | Performed by: FAMILY MEDICINE

## 2024-12-11 RX ORDER — VITAMIN A, VITAMIN C, VITAMIN D, VITAMIN E, THIAMINE, RIBOFLAVIN, NIACIN, VITAMIN B6, FOLIC ACID, VITAMIN B12, CALCIUM, IRON, ZINC, COPPER 4000; 120; 400; 22; 1.84; 3; 20; 10; 1; 12; 200; 27; 25; 2 [IU]/1; MG/1; [IU]/1; [IU]/1; MG/1; MG/1; MG/1; MG/1; MG/1; UG/1; MG/1; MG/1; MG/1; MG/1
1 TABLET ORAL EVERY MORNING
Qty: 30 TABLET | Refills: 6 | Status: SHIPPED | OUTPATIENT
Start: 2024-12-11

## 2024-12-11 NOTE — PROGRESS NOTES
"Name: Shelby Oliveira      : 1990      MRN: [id number]  Encounter Provider: Epifanio Bowles DO  Encounter Date: 2024   Encounter department: [facility]  :  Assessment & Plan  Migraine without status migrainosus, not intractable, unspecified migraine type         11 weeks gestation of pregnancy    Orders:  •  Prenatal Vit-Fe Fumarate-FA (M- Plus) 27-1 MG TABS; Take 1 tablet by mouth every morning           History of Present Illness     Patient presents with:  Migraine: Pt is experiencing migraine on and off for a while now. Pt has her FMLA form to be filled out by Dr. Bowles to extend her FMLA due to having migraines.   Cough: Pt also stated she's been waking up from her sleep coughing .         Migraine  Associated symptoms include coughing.   Cough  Associated symptoms include headaches.     Review of Systems   Constitutional: Negative.    HENT: Negative.     Respiratory:  Positive for cough.    Cardiovascular: Negative.    Gastrointestinal: Negative.    Neurological:  Positive for headaches.   Psychiatric/Behavioral:  Positive for sleep disturbance.        Objective   /84 (BP Location: Left arm, Patient Position: Sitting, Cuff Size: Large)   Pulse 85   Temp 98.1 °F (36.7 °C) (Temporal)   Ht 5' 5\" (1.651 m)   Wt 82.4 kg (181 lb 9.6 oz)   LMP 2024 (Exact Date)   SpO2 98%   BMI 30.22 kg/m²      Physical Exam  Vitals and nursing note reviewed.   Constitutional:       Appearance: Normal appearance. She is well-developed.   HENT:      Head: Normocephalic.   Eyes:      Pupils: Pupils are equal, round, and reactive to light.   Cardiovascular:      Rate and Rhythm: Normal rate and regular rhythm.      Heart sounds: Normal heart sounds.   Pulmonary:      Effort: Pulmonary effort is normal.      Breath sounds: Normal breath sounds.   Abdominal:      General: Bowel sounds are normal.      Palpations: Abdomen is soft.   Musculoskeletal:      Cervical back: " Normal range of motion and neck supple.   Skin:     General: Skin is warm and dry.      Capillary Refill: Capillary refill takes less than 2 seconds.   Neurological:      General: No focal deficit present.      Mental Status: She is alert and oriented to person, place, and time.   Psychiatric:         Mood and Affect: Mood normal.         Behavior: Behavior normal.

## 2024-12-12 ENCOUNTER — DOCUMENTATION (OUTPATIENT)
Age: 34
End: 2024-12-12

## 2024-12-17 ENCOUNTER — OFFICE VISIT (OUTPATIENT)
Dept: OBGYN CLINIC | Facility: CLINIC | Age: 34
End: 2024-12-17

## 2024-12-17 ENCOUNTER — OFFICE VISIT (OUTPATIENT)
Dept: DENTISTRY | Facility: CLINIC | Age: 34
End: 2024-12-17

## 2024-12-17 VITALS
DIASTOLIC BLOOD PRESSURE: 84 MMHG | WEIGHT: 183.2 LBS | HEART RATE: 89 BPM | BODY MASS INDEX: 30.52 KG/M2 | SYSTOLIC BLOOD PRESSURE: 129 MMHG | HEIGHT: 65 IN

## 2024-12-17 VITALS — DIASTOLIC BLOOD PRESSURE: 86 MMHG | TEMPERATURE: 95.5 F | SYSTOLIC BLOOD PRESSURE: 127 MMHG | HEART RATE: 80 BPM

## 2024-12-17 DIAGNOSIS — Z30.431 IUD CHECK UP: Primary | ICD-10-CM

## 2024-12-17 DIAGNOSIS — Z01.20 ENCOUNTER FOR DENTAL EXAMINATION: Primary | ICD-10-CM

## 2024-12-17 PROCEDURE — D1330 ORAL HYGIENE INSTRUCTIONS: HCPCS | Performed by: DENTAL HYGIENIST

## 2024-12-17 PROCEDURE — 99213 OFFICE O/P EST LOW 20 MIN: CPT | Performed by: OBSTETRICS & GYNECOLOGY

## 2024-12-17 PROCEDURE — D0120 PERIODIC ORAL EVALUATION - ESTABLISHED PATIENT: HCPCS | Performed by: DENTIST

## 2024-12-17 PROCEDURE — D1110 PROPHYLAXIS - ADULT: HCPCS | Performed by: DENTAL HYGIENIST

## 2024-12-17 NOTE — PROGRESS NOTES
IUD STRING CHECK    Shelby Oliveira is a 34 y.o. female who presents today for iud string check.  Patient reports it as follows:    Past Medical History:   Diagnosis Date    Anxiety     no medications    Chlamydia     early     Urogenital trichomoniasis      Past Surgical History:   Procedure Laterality Date    INDUCED       Surgically induced. Description: 2012      OB History          2    Para   1    Term   1       0    AB   1    Living   1         SAB   0    IAB   1    Ectopic   0    Multiple   0    Live Births   1               Social History     Tobacco Use    Smoking status: Never     Passive exposure: Never    Smokeless tobacco: Never    Tobacco comments:     Hookah socially   Vaping Use    Vaping status: Former    Substances: Nicotine, Flavoring   Substance Use Topics    Alcohol use: Not Currently     Comment: occasionally but stopped when found out pregnant    Drug use: Never     Social History     Substance and Sexual Activity   Sexual Activity Yes    Partners: Male    Birth control/protection: Pill, OCP       Current Outpatient Medications   Medication Instructions    cetirizine (ZYRTEC) 10 mg, Daily    dicyclomine (BENTYL) 20 mg, Oral, 2 times daily    miSOPROStol (Cytotec) 200 mcg tablet Take one tablet orally the night prior to procedure then Take one tablet insert intravaginally the morning of the procedure at 5:30am    montelukast (SINGULAIR) 10 mg, Oral, Daily at bedtime    norgestimate-ethinyl estradiol (ORTHO-CYCLEN) 0.25-35 MG-MCG per tablet 1 tablet, Oral, Daily    ondansetron (ZOFRAN-ODT) 4 mg, Oral, Every 8 hours PRN    Prenatal Vit-Fe Fumarate-FA (M-[place] Plus) 27-1 MG TABS 1 tablet, Oral, Every morning       History of Present Illness:   Patient present for copper iud string check with no complaints.    Review of Systems:  Review of Systems   Genitourinary:  Negative for menstrual problem, pelvic pain, vaginal bleeding and vaginal discharge.   All other  "systems reviewed and are negative.      Physical Exam:  /84 (BP Location: Left arm, Patient Position: Sitting)   Pulse 89   Ht 5' 5\" (1.651 m)   Wt 83.1 kg (183 lb 3.2 oz)   LMP 12/02/2024 (Exact Date)   BMI 30.49 kg/m²   Physical Exam  Constitutional:       Appearance: Normal appearance.   Genitourinary:      Bladder and urethral meatus normal.      No lesions in the vagina.      Right Labia: No rash, tenderness or lesions.     Left Labia: No tenderness, lesions or rash.     No vaginal discharge.      No cervical motion tenderness, discharge or lesion.      IUD strings visualized.      No urethral tenderness or mass present.   Neurological:      Mental Status: She is alert.   Vitals reviewed.           Assessment:   1. Iud surveillance    Plan:   1. Return to office prn.         Reviewed with patient that [facility], but that they will not necessarily be reviewed by me immediately.  Explained that I will review results at my earliest opportunity and contact patient appropriately.  "

## 2024-12-17 NOTE — PROGRESS NOTES
PERIODIC EXAM, ADULT PROPHY , (no xrays due )   REVIEWED MED HX: meds, allergies, health changes reviewed in [facility]. All consents signed.  CHIEF CONCERN: no dental pain or concerns  PAIN SCALE:  0  ASA CLASS:  I  PLAQUE:  mild  CALCULUS:  light  BLEEDING:   light  STAIN :   none      PERIO:  Mild gingivitis    Hygiene Procedures:  Scaled, Polished, Flossed    Oral Hygiene Instruction: Brushing Minimum 2x daily for 2 minutes, daily flossing, Listerine, and Recommended soft toothbrush only    Dispensed: Toothbrush, Toothpaste, Floss    Visual and Tactile Intraoral/ Extraoral evaluation: Oral and Oropharyngeal cancer evaluation. No findings     Dr. Lezama   Reviewed with patient clinical and radiographic findings and patient verbalized understanding. All questions and concerns addressed.     REFERRALS: none    CARIES FINDINGS:  Temporary on #19 is off  ---Discussed importance of following through with the crown on #19       TREATMENT  PLAN :   NV1:  Refine crown prep #19 - 60 min w/ Tao  NV2:  6mrc w/ Bws - 50 min    Last BWX:  6/6/24  Last Panorex:  6/4/20     FMX :

## 2024-12-19 ENCOUNTER — OFFICE VISIT (OUTPATIENT)
Dept: OBGYN CLINIC | Facility: CLINIC | Age: 34
End: 2024-12-19

## 2024-12-19 VITALS
HEART RATE: 90 BPM | BODY MASS INDEX: 30.65 KG/M2 | WEIGHT: 184.2 LBS | SYSTOLIC BLOOD PRESSURE: 141 MMHG | DIASTOLIC BLOOD PRESSURE: 97 MMHG

## 2024-12-19 DIAGNOSIS — N89.8 VAGINAL DISCHARGE: ICD-10-CM

## 2024-12-19 DIAGNOSIS — Z11.3 SCREENING FOR STDS (SEXUALLY TRANSMITTED DISEASES): Primary | ICD-10-CM

## 2024-12-19 PROCEDURE — 87491 CHLMYD TRACH DNA AMP PROBE: CPT

## 2024-12-19 PROCEDURE — 99213 OFFICE O/P EST LOW 20 MIN: CPT | Performed by: OBSTETRICS & GYNECOLOGY

## 2024-12-19 PROCEDURE — 87591 N.GONORRHOEAE DNA AMP PROB: CPT

## 2024-12-19 NOTE — PROGRESS NOTES
OB/GYN VISIT  Shelby Oliveira  2024  5:16 PM    Subjective:     Shelby Oliveira is a 34 y.o.  female who presents for evaluation of vaginal odor. She reports a mild increase in whiteish/brown discharge. She is concerned she may have an STD and would like to be tested today. She has no pain or irregular bleeding      Past Medical History:   Diagnosis Date    Anxiety     no medications    Chlamydia     early     Urogenital trichomoniasis      Past Surgical History:   Procedure Laterality Date    INDUCED       Surgically induced. Description: 2012        Objective:    Vitals: Blood pressure 141/97, pulse 90, weight 83.6 kg (184 lb 3.2 oz), last menstrual period 2024, not currently breastfeeding.Body mass index is 30.65 kg/m².    GEN: The patient was alert and oriented x3, pleasant well-appearing female in no acute distress.   CV: Regular rate  PULM: nonlabored respirations  MSK: Normal gait  Bladder and urethral meatus normal.      No lesions in the vagina.      Right Labia: No rash, tenderness or lesions.     Left Labia: No tenderness, lesions or rash.     No vaginal discharge.      Right Adnexa: not tender, not full and no mass present.     Left Adnexa: not tender, not full and no mass present.     No cervical motion tenderness, discharge or lesion.      Uterus is not enlarged or tender.      No uterine mass detected.     No urethral tenderness or mass present.    Skin: warm, dry  Neuro: no focal deficits  Psych: normal affect and judgement, cooperative        Assessment/Plan:  Problem List Items Addressed This Visit       Vaginal discharge    Patient having small amount of vaginal discharge and feels like she notices an odor  No vaginal discharge seen on pelvic exam   KOH and wet mount negative  GC/CT sent out, will follow up results          Other Visit Diagnoses         Screening for STDs (sexually transmitted diseases)    -  Primary    Relevant Orders    Chlamydia/GC  amplified DNA by PCR            D/w Dr. Vanessa Self, DO  12/19/2024  5:16 PM        Please note that while the recent CURES Act permits medical records be visible for patient review, clinical documentation is intended for utilization by healthcare professionals.  All test results are immediately available through [facility] as well, and we will review results at earliest opportunity and contact you with the appropriate urgency.  If you have a question about something you see in your chart, please don't hesitate to ask about it at your next appointment.

## 2024-12-19 NOTE — ASSESSMENT & PLAN NOTE
Patient having small amount of vaginal discharge and feels like she notices an odor  No vaginal discharge seen on pelvic exam   KOH and wet mount negative  GC/CT sent out, will follow up results

## 2024-12-20 ENCOUNTER — RESULTS FOLLOW-UP (OUTPATIENT)
Dept: OBGYN CLINIC | Facility: CLINIC | Age: 34
End: 2024-12-20

## 2024-12-20 LAB
C TRACH DNA SPEC QL NAA+PROBE: NEGATIVE
N GONORRHOEA DNA SPEC QL NAA+PROBE: NEGATIVE

## 2024-12-20 NOTE — TELEPHONE ENCOUNTER
Called patient to let her know results of STD test were negative.  All questions answered    Navya Self, DO   Obstetrics & Gynecology PGY-II  12/20/24  2:23 PM

## 2025-02-19 ENCOUNTER — OFFICE VISIT (OUTPATIENT)
Dept: OBGYN CLINIC | Facility: CLINIC | Age: 35
End: 2025-02-19

## 2025-02-19 VITALS
SYSTOLIC BLOOD PRESSURE: 138 MMHG | DIASTOLIC BLOOD PRESSURE: 82 MMHG | WEIGHT: 185.4 LBS | BODY MASS INDEX: 30.89 KG/M2 | HEIGHT: 65 IN

## 2025-02-19 DIAGNOSIS — B37.9 YEAST INFECTION: Primary | ICD-10-CM

## 2025-02-19 LAB
BV WHIFF TEST VAG QL: ABNORMAL
CLUE CELLS SPEC QL WET PREP: ABNORMAL
PH SMN: 4.5 [PH]
SL AMB POCT WET MOUNT: ABNORMAL
T VAGINALIS VAG QL WET PREP: ABNORMAL
YEAST VAG QL WET PREP: ABNORMAL

## 2025-02-19 PROCEDURE — 99214 OFFICE O/P EST MOD 30 MIN: CPT | Performed by: OBSTETRICS & GYNECOLOGY

## 2025-02-19 PROCEDURE — 87491 CHLMYD TRACH DNA AMP PROBE: CPT | Performed by: OBSTETRICS & GYNECOLOGY

## 2025-02-19 PROCEDURE — 87210 SMEAR WET MOUNT SALINE/INK: CPT | Performed by: OBSTETRICS & GYNECOLOGY

## 2025-02-19 PROCEDURE — 87591 N.GONORRHOEAE DNA AMP PROB: CPT | Performed by: OBSTETRICS & GYNECOLOGY

## 2025-02-19 RX ORDER — FLUCONAZOLE 150 MG/1
150 TABLET ORAL ONCE
Qty: 1 TABLET | Refills: 0 | Status: SHIPPED | OUTPATIENT
Start: 2025-02-19 | End: 2025-02-19

## 2025-02-19 NOTE — PROGRESS NOTES
"Subjective:     Shelby Oliveira is a 34 y.o.  female who presents with vaginal discharge and irritation on and off since she had her IUD placed last year. She also notices and odor but can describe what is smells like. She would also like G/C testing.    Objective:    Vitals: Blood pressure 138/82, height 5' 5\" (1.651 m), weight 84.1 kg (185 lb 6.4 oz), last menstrual period 2025, not currently breastfeeding.Body mass index is 30.85 kg/m².    Physical Exam  Constitutional:       Appearance: She is well-developed.   Genitourinary:      Vaginal discharge present.      IUD strings visualized.   Cardiovascular:      Rate and Rhythm: Normal rate and regular rhythm.      Heart sounds: Normal heart sounds. No murmur heard.     No friction rub. No gallop.   Pulmonary:      Effort: Pulmonary effort is normal. No respiratory distress.      Breath sounds: No wheezing.   Abdominal:      Palpations: Abdomen is soft.      Tenderness: There is no abdominal tenderness.   Musculoskeletal:         General: No tenderness.   Neurological:      Mental Status: She is alert and oriented to person, place, and time.   Vitals reviewed.         Recent Results (from the past 2 hours)   POCT wet mount    Collection Time: 25  3:45 PM   Result Value Ref Range    WET MOUNT yeast     Yeast, Wet Prep pos     pH 4.5     Whiff Test neg     Clue Cells neg     Trich, Wet Prep neg          Assessment/Plan:    Problem List Items Addressed This Visit    None  Visit Diagnoses         Yeast infection    -  Primary    Relevant Medications    fluconazole (DIFLUCAN) 150 mg tablet    Other Relevant Orders    Chlamydia/GC amplified DNA by PCR    POCT wet mount (Completed)              Maria G Cruz MD  2025  4:29 PM        "

## 2025-02-20 LAB
C TRACH DNA SPEC QL NAA+PROBE: NEGATIVE
N GONORRHOEA DNA SPEC QL NAA+PROBE: NEGATIVE

## 2025-03-13 ENCOUNTER — TELEPHONE (OUTPATIENT)
Dept: DENTISTRY | Facility: CLINIC | Age: 35
End: 2025-03-13

## 2025-03-21 NOTE — TELEPHONE ENCOUNTER
Patient dropped off [facility] paperwork and it has been placed in Rani Adam's bin. Patient was told there will be a $30 form fee for the paperwork.    Statement Selected

## 2025-03-25 ENCOUNTER — OFFICE VISIT (OUTPATIENT)
Age: 35
End: 2025-03-25
Payer: COMMERCIAL

## 2025-03-25 VITALS
HEART RATE: 90 BPM | TEMPERATURE: 97.7 F | OXYGEN SATURATION: 99 % | RESPIRATION RATE: 16 BRPM | SYSTOLIC BLOOD PRESSURE: 118 MMHG | DIASTOLIC BLOOD PRESSURE: 80 MMHG | HEIGHT: 65 IN | WEIGHT: 184 LBS | BODY MASS INDEX: 30.66 KG/M2

## 2025-03-25 PROCEDURE — 99214 OFFICE O/P EST MOD 30 MIN: CPT | Performed by: FAMILY MEDICINE

## 2025-03-25 RX ORDER — ESCITALOPRAM OXALATE 10 MG/1
10 TABLET ORAL DAILY
Qty: 30 TABLET | Refills: 5 | Status: SHIPPED | OUTPATIENT
Start: 2025-03-25 | End: 2025-09-21

## 2025-03-25 NOTE — PROGRESS NOTES
"Name: Shelby Oliveira      : 1990      MRN: [id number]  Encounter Provider: Epifanio Bowles DO  Encounter Date: 3/25/2025   Encounter department: [facility]  :  Assessment & Plan  Post-partum depression  Talked about possible therapies.  She will continue counseling.  Will start at the Lexapro 10 mg daily.  Follow-up in 3 to 4 weeks.    Orders:  •  escitalopram (LEXAPRO) 10 mg tablet; Take 1 tablet (10 mg total) by mouth daily           History of Present Illness   Patient presents with:  Depression: Patient is complaining of feeling depress and anxious. Pt would like to discuss medication. No other concerns.  LR        Depression      Review of Systems   Constitutional: Negative.    HENT: Negative.     Eyes: Negative.    Respiratory: Negative.     Cardiovascular: Negative.    Gastrointestinal: Negative.    Endocrine: Negative.    Genitourinary: Negative.    Musculoskeletal: Negative.    Skin: Negative.    Allergic/Immunologic: Negative.    Neurological: Negative.    Hematological: Negative.    Psychiatric/Behavioral:  Positive for decreased concentration, depression and dysphoric mood.    All other systems reviewed and are negative.      Objective   /80 (BP Location: Left arm, Patient Position: Sitting, Cuff Size: Adult)   Pulse 90   Temp 97.7 °F (36.5 °C) (Temporal)   Resp 16   Ht 5' 5\" (1.651 m)   Wt 83.5 kg (184 lb)   SpO2 99%   BMI 30.62 kg/m²      Physical Exam  Vitals and nursing note reviewed.   Constitutional:       Appearance: She is well-developed.   HENT:      Head: Normocephalic and atraumatic.   Eyes:      Pupils: Pupils are equal, round, and reactive to light.   Cardiovascular:      Rate and Rhythm: Normal rate.   Pulmonary:      Effort: Pulmonary effort is normal.   Abdominal:      Palpations: Abdomen is soft.   Musculoskeletal:         General: Normal range of motion.      Cervical back: Normal range of motion and neck supple.   Skin:     General: " Skin is warm.   Neurological:      Mental Status: She is alert and oriented to person, place, and time.   Psychiatric:         Behavior: Behavior normal.

## 2025-04-21 ENCOUNTER — OFFICE VISIT (OUTPATIENT)
Dept: DENTISTRY | Facility: CLINIC | Age: 35
End: 2025-04-21

## 2025-04-21 VITALS — DIASTOLIC BLOOD PRESSURE: 71 MMHG | SYSTOLIC BLOOD PRESSURE: 118 MMHG | HEART RATE: 74 BPM | TEMPERATURE: 95.5 F

## 2025-04-21 DIAGNOSIS — Z01.20 ENCOUNTER FOR DENTAL EXAMINATION: Primary | ICD-10-CM

## 2025-04-21 PROCEDURE — WIS2000 CROWN PREP

## 2025-04-21 NOTE — PROGRESS NOTES
Procedure Details  19 [id number] - CROWN PREP  [place] #19 prep refinement / new provisional    Shelby Oliveira 35 y.o. female presents for #19 prep refinement / new provisional.   PMH reviewed, no changes, ASA 2 - Patient with mild systemic disease with no functional limitations.   Diagnosis:   Endo-treated tooth in need of full coverage crown.    Prognosis:  Good.    Consent:  Risks of specific procedure: damage to adjacent tooth and/or restoration.    Benefits: prevent further breakdown of tooth and its sequelae.  Alternatives: no tx.  Tx plan for crown #19 reviewed. Opportunity to ask questions given, all questions answered to degree of medical and dental certainty.  Patient understands and consent given by Self via Patient.    Anesthesia:  Not needed because tooth RCT .    Procedure details:  Preliminary stent for provisional crown created with blue bite in triple tray. Due to the provisional being lost, the stent was reamed out to create the provisional.  Tooth #19 prepped with reduction for full zirconia.  Made provisional crown with provisa and matrix, refined with wade on high speed.  Confirmed provisional covers margins with no overhangs.    Cemented provisional crown/bridge using Provisa temporary cement. Removed excess cement, occlusion and contacts verified.       Patient dismissed ambulatory and alert.    NV: #19 prep refinement (further reduce lingual), final impression and new provisional if time permits.

## 2025-04-21 NOTE — DENTAL PROCEDURE DETAILS
[place] #19 prep refinement / new provisional    Shelby Oliveira 35 y.o. female presents for #19 prep refinement / new provisional.   PMH reviewed, no changes, ASA 2 - Patient with mild systemic disease with no functional limitations.   Diagnosis:   Endo-treated tooth in need of full coverage crown.    Prognosis:  Good.    Consent:  Risks of specific procedure: damage to adjacent tooth and/or restoration.    Benefits: prevent further breakdown of tooth and its sequelae.  Alternatives: no tx.  Tx plan for crown #19 reviewed. Opportunity to ask questions given, all questions answered to degree of medical and dental certainty.  Patient understands and consent given by Self via Patient.    Anesthesia:  Not needed because tooth RCT.    Procedure details:  Preliminary stent for provisional crown created with blue bite in triple tray. Due to the provisional being lost, the stent was reamed out to create the provisional.  Tooth #19 prepped with reduction for full zirconia.  Made provisional crown with provisa and matrix, refined with wade on high speed.  Confirmed provisional covers margins with no overhangs.    Cemented provisional crown/bridge using Provisa temporary cement. Removed excess cement, occlusion and contacts verified.       Patient dismissed ambulatory and alert.    NV: #19 prep refinement (further reduce lingual), final impression and new provisional if time permits.

## 2025-04-22 ENCOUNTER — OFFICE VISIT (OUTPATIENT)
Age: 35
End: 2025-04-22
Payer: COMMERCIAL

## 2025-04-22 VITALS
WEIGHT: 187 LBS | SYSTOLIC BLOOD PRESSURE: 120 MMHG | TEMPERATURE: 98.4 F | DIASTOLIC BLOOD PRESSURE: 80 MMHG | HEIGHT: 65 IN | HEART RATE: 81 BPM | BODY MASS INDEX: 31.16 KG/M2 | OXYGEN SATURATION: 98 %

## 2025-04-22 DIAGNOSIS — Z00.00 WELL ADULT EXAM: Primary | ICD-10-CM

## 2025-04-22 PROCEDURE — 99395 PREV VISIT EST AGE 18-39: CPT | Performed by: FAMILY MEDICINE

## 2025-04-22 NOTE — PROGRESS NOTES
"Name: Shelby Oliveira      : 1990      MRN: [id number]  Encounter Provider: Epifanio Bowles DO  Encounter Date: 2025   Encounter department: [facility]  :  Assessment & Plan  Well adult exam    Orders:  •  Hemoglobin A1C  •  Comprehensive metabolic panel  •  CBC and differential  •  Lipid panel  •  TSH, 3rd generation with Free T4 reflex    She is doing well.  Continue current therapy.  Will follow-up in about 2 months for an annual exam she will have lab work done before that.       History of Present Illness   Patient presents with:  4 wk ckup: Pt started Lexapro, states she is doing OK, but has a hard time waking up.      She is happy with the results. She is sleeping well.      Review of Systems   Constitutional: Negative.    HENT: Negative.     Eyes: Negative.    Respiratory: Negative.     Cardiovascular: Negative.    Gastrointestinal: Negative.    Endocrine: Negative.    Genitourinary: Negative.    Musculoskeletal: Negative.    Skin: Negative.    Allergic/Immunologic: Negative.    Neurological: Negative.    Hematological: Negative.    Psychiatric/Behavioral: Negative.     All other systems reviewed and are negative.      Objective   /80 (BP Location: Right arm, Patient Position: Sitting, Cuff Size: Large)   Pulse 81   Temp 98.4 °F (36.9 °C)   Ht 5' 5\" (1.651 m)   Wt 84.8 kg (187 lb)   SpO2 98%   BMI 31.12 kg/m²      Physical Exam  Vitals and nursing note reviewed.   Constitutional:       Appearance: She is well-developed.   HENT:      Head: Normocephalic and atraumatic.   Eyes:      Pupils: Pupils are equal, round, and reactive to light.   Cardiovascular:      Rate and Rhythm: Normal rate.   Pulmonary:      Effort: Pulmonary effort is normal.   Abdominal:      Palpations: Abdomen is soft.   Musculoskeletal:         General: Normal range of motion.      Cervical back: Normal range of motion and neck supple.   Skin:     General: Skin is warm.   Neurological: "      Mental Status: She is alert and oriented to person, place, and time.   Psychiatric:         Behavior: Behavior normal.

## 2025-04-24 ENCOUNTER — APPOINTMENT (OUTPATIENT)
Dept: LAB | Facility: HOSPITAL | Age: 35
End: 2025-04-24
Payer: COMMERCIAL

## 2025-04-24 ENCOUNTER — RESULTS FOLLOW-UP (OUTPATIENT)
Age: 35
End: 2025-04-24

## 2025-04-24 LAB
ALBUMIN SERPL BCG-MCNC: 4.3 G/DL (ref 3.5–5)
ALP SERPL-CCNC: 84 U/L (ref 34–104)
ALT SERPL W P-5'-P-CCNC: 12 U/L (ref 7–52)
ANION GAP SERPL CALCULATED.3IONS-SCNC: 9 MMOL/L (ref 4–13)
AST SERPL W P-5'-P-CCNC: 15 U/L (ref 13–39)
BASOPHILS # BLD AUTO: 0.05 THOUSANDS/ÂΜL (ref 0–0.1)
BASOPHILS NFR BLD AUTO: 1 % (ref 0–1)
BILIRUB SERPL-MCNC: 1.42 MG/DL (ref 0.2–1)
BUN SERPL-MCNC: 10 MG/DL (ref 5–25)
CALCIUM SERPL-MCNC: 8.9 MG/DL (ref 8.4–10.2)
CHLORIDE SERPL-SCNC: 103 MMOL/L (ref 96–108)
CHOLEST SERPL-MCNC: 164 MG/DL (ref ?–200)
CO2 SERPL-SCNC: 26 MMOL/L (ref 21–32)
CREAT SERPL-MCNC: 0.58 MG/DL (ref 0.6–1.3)
EOSINOPHIL # BLD AUTO: 0.11 THOUSAND/ÂΜL (ref 0–0.61)
EOSINOPHIL NFR BLD AUTO: 1 % (ref 0–6)
ERYTHROCYTE [DISTWIDTH] IN BLOOD BY AUTOMATED COUNT: 11.7 % (ref 11.6–15.1)
EST. AVERAGE GLUCOSE BLD GHB EST-MCNC: 94 MG/DL
GFR SERPL CREATININE-BSD FRML MDRD: 121 ML/MIN/1.73SQ M
GLUCOSE P FAST SERPL-MCNC: 92 MG/DL (ref 65–99)
HBA1C MFR BLD: 4.9 %
HCT VFR BLD AUTO: 40.3 % (ref 34.8–46.1)
HDLC SERPL-MCNC: 53 MG/DL
HGB BLD-MCNC: 12.4 G/DL (ref 11.5–15.4)
IMM GRANULOCYTES # BLD AUTO: 0.03 THOUSAND/UL (ref 0–0.2)
IMM GRANULOCYTES NFR BLD AUTO: 0 % (ref 0–2)
LDLC SERPL CALC-MCNC: 92 MG/DL (ref 0–100)
LYMPHOCYTES # BLD AUTO: 2.3 THOUSANDS/ÂΜL (ref 0.6–4.47)
LYMPHOCYTES NFR BLD AUTO: 30 % (ref 14–44)
MCH RBC QN AUTO: 27.8 PG (ref 26.8–34.3)
MCHC RBC AUTO-ENTMCNC: 30.8 G/DL (ref 31.4–37.4)
MCV RBC AUTO: 90 FL (ref 82–98)
MONOCYTES # BLD AUTO: 0.48 THOUSAND/ÂΜL (ref 0.17–1.22)
MONOCYTES NFR BLD AUTO: 6 % (ref 4–12)
NEUTROPHILS # BLD AUTO: 4.65 THOUSANDS/ÂΜL (ref 1.85–7.62)
NEUTS SEG NFR BLD AUTO: 62 % (ref 43–75)
NONHDLC SERPL-MCNC: 111 MG/DL
NRBC BLD AUTO-RTO: 0 /100 WBCS
PLATELET # BLD AUTO: 352 THOUSANDS/UL (ref 149–390)
PMV BLD AUTO: 10.4 FL (ref 8.9–12.7)
POTASSIUM SERPL-SCNC: 3.8 MMOL/L (ref 3.5–5.3)
PROT SERPL-MCNC: 7.1 G/DL (ref 6.4–8.4)
RBC # BLD AUTO: 4.46 MILLION/UL (ref 3.81–5.12)
SODIUM SERPL-SCNC: 138 MMOL/L (ref 135–147)
TRIGL SERPL-MCNC: 95 MG/DL (ref ?–150)
TSH SERPL DL<=0.05 MIU/L-ACNC: 0.88 UIU/ML (ref 0.45–4.5)
WBC # BLD AUTO: 7.62 THOUSAND/UL (ref 4.31–10.16)

## 2025-04-24 PROCEDURE — 36415 COLL VENOUS BLD VENIPUNCTURE: CPT | Performed by: FAMILY MEDICINE

## 2025-04-24 PROCEDURE — 85025 COMPLETE CBC W/AUTO DIFF WBC: CPT | Performed by: FAMILY MEDICINE

## 2025-04-24 PROCEDURE — 80061 LIPID PANEL: CPT | Performed by: FAMILY MEDICINE

## 2025-04-24 PROCEDURE — 80053 COMPREHEN METABOLIC PANEL: CPT | Performed by: FAMILY MEDICINE

## 2025-04-24 PROCEDURE — 83036 HEMOGLOBIN GLYCOSYLATED A1C: CPT | Performed by: FAMILY MEDICINE

## 2025-04-24 PROCEDURE — 84443 ASSAY THYROID STIM HORMONE: CPT | Performed by: FAMILY MEDICINE

## 2025-06-12 ENCOUNTER — OFFICE VISIT (OUTPATIENT)
Age: 35
End: 2025-06-12
Payer: COMMERCIAL

## 2025-06-12 VITALS
TEMPERATURE: 97.5 F | SYSTOLIC BLOOD PRESSURE: 120 MMHG | OXYGEN SATURATION: 98 % | BODY MASS INDEX: 30.99 KG/M2 | WEIGHT: 186 LBS | HEART RATE: 75 BPM | DIASTOLIC BLOOD PRESSURE: 80 MMHG | HEIGHT: 65 IN

## 2025-06-12 DIAGNOSIS — Z00.00 WELL ADULT EXAM: ICD-10-CM

## 2025-06-12 DIAGNOSIS — G47.33 OSA (OBSTRUCTIVE SLEEP APNEA): ICD-10-CM

## 2025-06-12 DIAGNOSIS — D22.9 ATYPICAL MOLE: ICD-10-CM

## 2025-06-12 PROCEDURE — 99214 OFFICE O/P EST MOD 30 MIN: CPT | Performed by: FAMILY MEDICINE

## 2025-06-12 RX ORDER — ESCITALOPRAM OXALATE 20 MG/1
20 TABLET ORAL DAILY
Qty: 30 TABLET | Refills: 5 | Status: SHIPPED | OUTPATIENT
Start: 2025-06-12 | End: 2025-12-09

## 2025-06-12 NOTE — PROGRESS NOTES
"Name: Shelby Oliveira      : 1990      MRN: [id number]  Encounter Provider: Epifanio oBwles DO  Encounter Date: 2025   Encounter department: [facility]  :  Assessment & Plan  Well adult exam    Orders:  •  Quantiferon TB Gold Plus Assay; Future  •  Chlamydia/GC amplified DNA by PCR; Future  •  HIV 1/2 AB/AG w Reflex [facility] for 2 yr old and above; Future    Post-partum depression      Orders:  •  escitalopram (LEXAPRO) 20 mg tablet; Take 1 tablet (20 mg total) by mouth daily    Atypical mole    Orders:  •  Ambulatory Referral to Dermatology; Future    YESSICA (obstructive sleep apnea)    Orders:  •  Ambulatory Referral to Sleep Medicine; Future           History of Present Illness   Patient presents with:  review labs: Discuss meds  would like to up her Lexapro and get something for sleep.  Also needs something for her allergies    She also complains of feeling tired during the day.  She says she is told that she snores a lot.      Review of Systems   Constitutional: Negative.    HENT: Negative.     Eyes: Negative.    Respiratory: Negative.     Cardiovascular: Negative.    Gastrointestinal: Negative.    Endocrine: Negative.    Genitourinary: Negative.    Musculoskeletal: Negative.    Skin: Negative.    Allergic/Immunologic: Negative.    Neurological: Negative.    Hematological: Negative.    Psychiatric/Behavioral:  Positive for sleep disturbance.    All other systems reviewed and are negative.      Objective   /80 (BP Location: Left arm, Patient Position: Sitting, Cuff Size: Large)   Pulse 75   Temp 97.5 °F (36.4 °C)   Ht 5' 5\" (1.651 m)   Wt 84.4 kg (186 lb)   SpO2 98%   BMI 30.95 kg/m²      Physical Exam  Vitals and nursing note reviewed.   Constitutional:       Appearance: She is well-developed. She is obese.   HENT:      Head: Normocephalic.     Eyes:      Pupils: Pupils are equal, round, and reactive to light.       Cardiovascular:      Rate and Rhythm: Normal " rate and regular rhythm.      Heart sounds: Normal heart sounds.   Pulmonary:      Effort: Pulmonary effort is normal.      Breath sounds: Normal breath sounds.   Abdominal:      General: Bowel sounds are normal.      Palpations: Abdomen is soft.     Musculoskeletal:      Cervical back: Normal range of motion and neck supple.     Skin:     General: Skin is warm and dry.      Capillary Refill: Capillary refill takes less than 2 seconds.     Neurological:      General: No focal deficit present.      Mental Status: She is alert and oriented to person, place, and time.     Psychiatric:         Mood and Affect: Mood normal.         Behavior: Behavior normal.

## 2025-06-13 ENCOUNTER — APPOINTMENT (OUTPATIENT)
Dept: LAB | Facility: HOSPITAL | Age: 35
End: 2025-06-13
Payer: COMMERCIAL

## 2025-06-13 DIAGNOSIS — Z00.00 WELL ADULT EXAM: ICD-10-CM

## 2025-06-13 PROCEDURE — 87491 CHLMYD TRACH DNA AMP PROBE: CPT

## 2025-06-13 PROCEDURE — 86480 TB TEST CELL IMMUN MEASURE: CPT

## 2025-06-13 PROCEDURE — 87591 N.GONORRHOEAE DNA AMP PROB: CPT

## 2025-06-13 PROCEDURE — 36415 COLL VENOUS BLD VENIPUNCTURE: CPT

## 2025-06-13 PROCEDURE — 87389 HIV-1 AG W/HIV-1&-2 AB AG IA: CPT

## 2025-06-14 LAB — HIV 1+2 AB+HIV1 P24 AG SERPL QL IA: NORMAL

## 2025-06-15 LAB
GAMMA INTERFERON BACKGROUND BLD IA-ACNC: 0.04 IU/ML
M TB IFN-G BLD-IMP: NEGATIVE
M TB IFN-G CD4+ BCKGRND COR BLD-ACNC: 0.01 IU/ML
M TB IFN-G CD4+ BCKGRND COR BLD-ACNC: 0.01 IU/ML
MITOGEN IGNF BCKGRD COR BLD-ACNC: 9.96 IU/ML

## 2025-06-16 LAB
C TRACH DNA SPEC QL NAA+PROBE: NEGATIVE
N GONORRHOEA DNA SPEC QL NAA+PROBE: NEGATIVE

## 2025-06-18 ENCOUNTER — OFFICE VISIT (OUTPATIENT)
Dept: SLEEP CENTER | Facility: CLINIC | Age: 35
End: 2025-06-18
Attending: FAMILY MEDICINE
Payer: COMMERCIAL

## 2025-06-18 VITALS
DIASTOLIC BLOOD PRESSURE: 82 MMHG | SYSTOLIC BLOOD PRESSURE: 121 MMHG | BODY MASS INDEX: 31.32 KG/M2 | HEIGHT: 65 IN | WEIGHT: 188 LBS

## 2025-06-18 DIAGNOSIS — G47.00 INSOMNIA, UNSPECIFIED TYPE: ICD-10-CM

## 2025-06-18 DIAGNOSIS — G47.39 SLEEP APNEA-LIKE BEHAVIOR: Primary | ICD-10-CM

## 2025-06-18 PROCEDURE — 99245 OFF/OP CONSLTJ NEW/EST HI 55: CPT | Performed by: NURSE PRACTITIONER

## 2025-06-18 NOTE — PROGRESS NOTES
Name: Shelby Oliveira      : 1990      MRN: [id number]  Encounter Provider: DOMONIQUE Rosado  Encounter Date: 2025   Encounter department: [facility] [place]  :  Assessment & Plan  Sleep apnea-like behavior    35 y.o. y/o who comes in for evaluation of possible sleep apnea  1.  Snoring/coughing/choking during sleep/non-refreshing sleep -   Body mass index is 31.28 kg/m²., Neck Circumference: 14.5.  The patient is at risk for obstructive sleep apnea based on history and assessment noted.       -  Check a baseline sleep study to assess for obstructive sleep apnea      -  I discussed in depth the types of diagnostic studies and treatment options involved with obstructive sleep apnea      -  I also discussed in depth the risk of leaving sleep apnea untreated, including hypertension, heart failure, arrhythmia, MI and stroke.      -  The patient is agreeable to undergo testing and treatment of sleep apnea.  The patient understands the pitfalls they may encounter along the way and is willing to attempt treatment with PAP equipment.  The patient would likely do best to start with a hybrid full face mask, if PAP therapy is indicated.      Orders:    Ambulatory Referral to Sleep Medicine    Diagnostic Polysomnogram; Future    Insomnia, unspecified type  Patient wakes up multiple times during the night.  At times she coughs or chokes, but sometimes wakes up for unknown reasons.  We discussed that airway may be closing, causing choking, coughing and sleep apnea.  Diagnostic polysomnogram ordered for further evaluation.  Orders:    Diagnostic Polysomnogram; Future        History of Present Illness   Shelby Oliveira is a 35 year old female with PMHx of migraines, who presents for a consultation regarding concern of possible obstructive sleep apnea.  She reports snoring and waking up with a choking sensation.   At times she gets out of bed after coughing and expectorates clear mucous.  She wakes up  2 times per night and returns to sleep without difficulty.  Snoring is worse when supine.  She does not feel rested when waking up.       Sitting and reading: (Patient-Rptd) Would never doze  Watching TV: (Patient-Rptd) Would never doze  Sitting, inactive in a public place (e.g. a theatre or a meeting): (Patient-Rptd) Would never doze  As a passenger in a car for an hour without a break: (Patient-Rptd) Slight chance of dozing  Lying down to rest in the afternoon when circumstances permit: (Patient-Rptd) Moderate chance of dozing  Sitting and talking to someone: (Patient-Rptd) Would never doze  Sitting quietly after a lunch without alcohol: (Patient-Rptd) Slight chance of dozing  In a car, while stopped for a few minutes in traffic: (Patient-Rptd) Would never doze  Total score: (Patient-Rptd) 4     Review of Systems   Respiratory:  Positive for cough and choking.         Symptoms of coughing and choking that wake her from sleep   Cardiovascular: Negative.    Musculoskeletal: Negative.    Neurological:  Positive for headaches.        Rarely has headaches, but wakes up with them at times   Psychiatric/Behavioral:  Positive for sleep disturbance.      Pertinent positives/negatives included in HPI and also as noted:     [facility] New Patient Questionnaire    Question 6/14/2025  8:56 AM EDT - Filed by Patient   What is the main reason for the visit? (select one) Snoring that disturbs someone   What other concerns do you have? Snoring   How does your sleep problem affect your activities of daily living? 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 No   Have you ever had a sleep study done? No   What time do you typically go to bed weekdays or workdays? 10:30 pm   What time do you typically go to bed weekends or days off? 11:30 pm   How long does it take to fall asleep? 16-30  minutes   How often is it hard for you to fall asleep? Few times a month   How often do you sleep through the night without waking up? Few times a month   If you do wake during the night, how many times do you typically wake up during your sleep time? 1 to 2 times per night   What wakes you up? Need to use bathroom   How often is it hard for you to return to sleep after awakening in the middle of the night? Rare   What time do you wake up for the last time on Weekdays or Workdays? 6:00 am   What time do you wake up for the last time on weekends? 8:00 am   How do you wake up for the final time to start your day? Wake on own, without alarm   How many hours do you sleep on average? 6   Do you typically feel refreshed when you first awaken? Sometimes   Are you sleepy during the day? Sometimes   Do you intentionally try to nap? Yes   How long do you sleep during a nap? greater than 60 minutes   How often do you nap? Rarely - occasionally on weekends   How often do you doze (fall asleep without intending to)? Never   Do you currently use medication, supplements or substances to help you fall or stay asleep? Yes, over the counter/supplements, sometimes takes benadryl   In the past have you used medication, supplements or substances to help you fall or stay asleep? Yes, over the counter/supplements   Do you use any of the following to stay awake? Energy drinks - Celsius, as late as 3:00pm   Do you drink any of the following beverages more than a few days a week? Coffee   What time do you usually last drink a caffeinated beverage? Before 12:00 pm   Do you share a bed with someone at home? Yes, always   Are you aware or have you been told that you have these breathing-related symptoms while sleeping? Snore loudly   Do you breathe well through your nose? Can't breathe well through nose only in allergy season (spring/fall)   Have you ever used CPAP or BIPAP machine? No   Do you use oxygen? No   RLS Symptoms    Do you ever  "experience any of the following? No Symptoms   Narcolepsy symptoms    Have you ever experienced any of the following? She has had episodes of sleep paralysis - rare   Parasomnia Symptoms    Have you ever experienced any of the following? Nightmares - not problematic   Misc Symptoms    Have you ever experienced the following? No symptoms  No nocturnal GERD  No nocturnal palpitations  Occasionally has anxiety, but lexapro is helping (takes in the am)  She wakes up with headaches occasionally     No known family history of sleep disorders  Medical History Reviewed by provider this encounter:  Tobacco  Allergies  Meds  Problems  Med Hx  Surg Hx  Fam Hx     .  Medications Ordered Prior to Encounter[1]   Objective   /82 (BP Location: Left arm, Patient Position: Sitting, Cuff Size: Large)   Ht 5' 5\" (1.651 m)   Wt 85.3 kg (188 lb)   BMI 31.28 kg/m²     Neck Circumference: 14.5  Physical Exam  Visit Vitals  /82 (BP Location: Left arm, Patient Position: Sitting, Cuff Size: Large)   Ht 5' 5\" (1.651 m)   Wt 85.3 kg (188 lb)   BMI 31.28 kg/m²   OB Status Unknown   Smoking Status Never   BSA 1.93 m²       Constitutional: Alert, cooperative, no distress, appears stated age, obese  Skin: Warm, dry, no rashes noted  Eyes: Conjunctiva/corneas clear  ENT: Nasal congestion absent, nares normal, septum midline, mucosa normal  Posterior Airspace:   Boothe Tongue Position: 3  Retrognathia: absent  Overbite: absent  High Arched Palate: absent  Tongue Scalloping/Ridging: present  Tonsils: 1+ barely visualized  Uvula: only partially visualized  Lungs: Clear to auscultation bilaterally, respirations unlabored  Heart: normal rate and regular rhythm, S1/2 normal, no murmur noted, no rub or gallop  Extremities: Normal, no digital clubbing, no pedal edema  Neuro: No focal deficits noted      Data  Lab Results   Component Value Date    HGB 12.4 04/24/2025    HCT 40.3 04/24/2025    MCV 90 04/24/2025      Lab Results " "  Component Value Date    CALCIUM 8.9 2025    K 3.8 2025    CO2 26 2025     2025    BUN 10 2025    CREATININE 0.58 (L) 2025     No results found for: \"IRON\", \"TIBC\", \"FERRITIN\"  Lab Results   Component Value Date    AST 15 2025    ALT 12 2025       Administrative Statements   I have spent a total time of 55 minutes in caring for this patient on the day of the visit/encounter including Risks and benefits of tx options, Instructions for management, Patient and family education, Importance of tx compliance, Risk factor reductions, Impressions, Counseling / Coordination of care, Documenting in the medical record, Reviewing/placing orders in the medical record (including tests, medications, and/or procedures), and Obtaining or reviewing history  .         [1]   Current Outpatient Medications on File Prior to Visit   Medication Sig Dispense Refill    escitalopram (LEXAPRO) 20 mg tablet Take 1 tablet (20 mg total) by mouth daily 30 tablet 5    Prenatal Vit-Fe Fumarate-FA (M-Camryn Plus) 27-1 MG TABS Take 1 tablet by mouth every morning 30 tablet 6     Current Facility-Administered Medications on File Prior to Visit   Medication Dose Route Frequency Provider Last Rate Last Admin    intrauterine copper (PARAGARD) IUD  1 each Intrauterine Device Once every 10 years    1 Intra Uterine Device at 24 1454    intrauterine copper (PARAGARD) IUD  1 each Intrauterine Device One-time injection          "

## 2025-06-18 NOTE — ASSESSMENT & PLAN NOTE
Patient wakes up multiple times during the night.  At times she coughs or chokes, but sometimes wakes up for unknown reasons.  We discussed that airway may be closing, causing choking, coughing and sleep apnea.  Diagnostic polysomnogram ordered for further evaluation.  Orders:    Diagnostic Polysomnogram; Future

## 2025-06-18 NOTE — PATIENT INSTRUCTIONS
Patient Instructions:     Schedule the diagnostic sleep study  The office will call with results and plan of treatment    Thank you for trusting me with your care!    I know we often  cover a lot of information at the visit, so if you have follow-up questions, are unclear about the plan, or feel there were important items that we did not discuss or you did not receive clarity on, please don't hesitate to reach out to me.     [employer] messages are preferred for routine matters.  Please make sure to call for urgent matters as there can be a delay in responding to questions over [employer].    IMPORTANT- Prior to a sleep study (at home or in the sleep lab), I strongly recommend contacting your medical insurance first to understand your benefits (including deductible if applicable), coverage for this test, and out of pocket costs.  Even if the test is approved by medical insurance, the cost to you is determined by your medical benefits.   If you have concerns about your out of pocket costs for sleep testing, please contact me/the office before you complete the test and we can discuss if there are alternate options.     I recommend following this advice in general before any lab test, imaging test, doctor visit, surgery, or ordering CPAP supplies as it is best to understand your coverage to avoid unexpected bills after the fact.       Nursing Support:  When: Monday through Friday 7:30A-4:30PM except holidays  Where: Our direct line is [phone]  *3  *1.      If you are having a true emergency please call 911.  In the event that the line is busy or it is after hours please leave a voice message and we will return your call.  Please speak clearly, leaving your full name, birth date, best number to reach you and the reason for your call.   Medication refills: We will need the name of the medication, the dosage, the ordering provider, whether you get a 30 or 90 day refill, and the pharmacy name and address.  Medications will  be ordered by the provider only.  Nurses cannot call in prescriptions.  Please allow 7 days for medication refills.  Physician requested updates: If your provider requested that you call with an update after starting medication, please be ready to provide us the medication and dosage, what time you take your medication, the time you attempt to fall asleep, time you fall asleep, when you wake up, and what time you get out of bed.  Sleep Study Results: We will contact you with sleep study results and/or next steps after the physician has reviewed your testing.

## 2025-06-18 NOTE — ASSESSMENT & PLAN NOTE
35 y.o. y/o who comes in for evaluation of possible sleep apnea  1.  Snoring/coughing/choking during sleep/non-refreshing sleep -   Body mass index is 31.28 kg/m²., Neck Circumference: 14.5.  The patient is at risk for obstructive sleep apnea based on history and assessment noted.       -  Check a baseline sleep study to assess for obstructive sleep apnea      -  I discussed in depth the types of diagnostic studies and treatment options involved with obstructive sleep apnea      -  I also discussed in depth the risk of leaving sleep apnea untreated, including hypertension, heart failure, arrhythmia, MI and stroke.      -  The patient is agreeable to undergo testing and treatment of sleep apnea.  The patient understands the pitfalls they may encounter along the way and is willing to attempt treatment with PAP equipment.  The patient would likely do best to start with a hybrid full face mask, if PAP therapy is indicated.      Orders:    Ambulatory Referral to Sleep Medicine    Diagnostic Polysomnogram; Future

## 2025-06-20 ENCOUNTER — OFFICE VISIT (OUTPATIENT)
Dept: DENTISTRY | Facility: CLINIC | Age: 35
End: 2025-06-20

## 2025-06-20 VITALS — DIASTOLIC BLOOD PRESSURE: 82 MMHG | SYSTOLIC BLOOD PRESSURE: 123 MMHG | HEART RATE: 86 BPM

## 2025-06-20 DIAGNOSIS — Z01.20 ENCOUNTER FOR DENTAL EXAMINATION: Primary | ICD-10-CM

## 2025-06-20 PROCEDURE — D0120 PERIODIC ORAL EVALUATION - ESTABLISHED PATIENT: HCPCS | Performed by: DENTIST

## 2025-06-20 PROCEDURE — D0274 BITEWINGS - 4 RADIOGRAPHIC IMAGES: HCPCS | Performed by: DENTAL HYGIENIST

## 2025-06-20 PROCEDURE — D1330 ORAL HYGIENE INSTRUCTIONS: HCPCS | Performed by: DENTAL HYGIENIST

## 2025-06-20 PROCEDURE — D1110 PROPHYLAXIS - ADULT: HCPCS | Performed by: DENTAL HYGIENIST

## 2025-06-20 NOTE — PROGRESS NOTES
PERIODIC EXAM, ADULT PROPHY , 4 BWX   REVIEWED MED HX: meds, allergies, health changes reviewed in [facility]. All consents signed.  CHIEF CONCERN: no dental pain or concerns  PAIN SCALE:  0  ASA CLASS:  I  PLAQUE:  mild  CALCULUS:  light to mod sub calc  BLEEDING:   light  STAIN :   none      PERIO:  Mild gingivitis  ---do FMP at next recall    Hygiene Procedures:  Scaled, Polished, Flossed    Oral Hygiene Instruction: Brushing Minimum 2x daily for 2 minutes, daily flossing, Listerine, and Recommended soft toothbrush only    Dispensed: Toothbrush, Toothpaste, Floss    Visual and Tactile Intraoral/ Extraoral evaluation: Oral and Oropharyngeal cancer evaluation. No findings     Dr. New   Reviewed with patient clinical and radiographic findings and patient verbalized understanding. All questions and concerns addressed.     REFERRALS: none    CARIES FINDINGS: see tooth chart  ---Temporary crown #19 lost about a week after last appt 4/21       TREATMENT  PLAN :   NV1:  #19 - crown prep refinement and imp  NV2:  6mrc - no xrays - 50 min    Last BWX:   6/20/25  Last Panorex:  6/4/20  Last FMX :

## 2025-07-07 ENCOUNTER — TELEPHONE (OUTPATIENT)
Age: 35
End: 2025-07-07

## 2025-07-07 DIAGNOSIS — K21.00 GASTROESOPHAGEAL REFLUX DISEASE WITH ESOPHAGITIS WITHOUT HEMORRHAGE: Primary | ICD-10-CM

## 2025-07-07 RX ORDER — OMEPRAZOLE 20 MG/1
20 CAPSULE, DELAYED RELEASE ORAL
Qty: 90 CAPSULE | Refills: 3 | Status: SHIPPED | OUTPATIENT
Start: 2025-07-07 | End: 2026-07-02

## 2025-07-19 DIAGNOSIS — Z3A.11 11 WEEKS GESTATION OF PREGNANCY: ICD-10-CM

## 2025-07-20 RX ORDER — VITAMIN A, VITAMIN C, VITAMIN D, VITAMIN E, THIAMINE, RIBOFLAVIN, NIACIN, VITAMIN B6, FOLIC ACID, VITAMIN B12, CALCIUM, IRON, ZINC, COPPER 4000; 120; 400; 22; 1.84; 3; 20; 10; 1; 12; 200; 27; 25; 2 [IU]/1; MG/1; [IU]/1; [IU]/1; MG/1; MG/1; MG/1; MG/1; MG/1; UG/1; MG/1; MG/1; MG/1; MG/1
1 TABLET ORAL EVERY MORNING
Qty: 90 TABLET | Refills: 1 | Status: SHIPPED | OUTPATIENT
Start: 2025-07-20